# Patient Record
Sex: MALE | Race: BLACK OR AFRICAN AMERICAN | NOT HISPANIC OR LATINO | Employment: FULL TIME | URBAN - METROPOLITAN AREA
[De-identification: names, ages, dates, MRNs, and addresses within clinical notes are randomized per-mention and may not be internally consistent; named-entity substitution may affect disease eponyms.]

---

## 2017-04-05 ENCOUNTER — PATIENT MESSAGE (OUTPATIENT)
Dept: FAMILY MEDICINE | Facility: CLINIC | Age: 37
End: 2017-04-05

## 2017-04-05 DIAGNOSIS — E66.01 MORBID OBESITY WITH BMI OF 40.0-44.9, ADULT: Primary | ICD-10-CM

## 2017-04-05 NOTE — TELEPHONE ENCOUNTER
Patient requesting referral to nutrition.   Please advised him I have referred him to [facility]. They can talk about diet and exercise there.     Regarding connecting the [facility]bit, he can come in anytime and see someone in the [facility] for questions connecting.

## 2017-04-28 ENCOUNTER — OFFICE VISIT (OUTPATIENT)
Dept: FAMILY MEDICINE | Facility: CLINIC | Age: 37
End: 2017-04-28
Payer: COMMERCIAL

## 2017-04-28 VITALS
BODY MASS INDEX: 35.16 KG/M2 | WEIGHT: 224 LBS | HEIGHT: 67 IN | HEART RATE: 90 BPM | DIASTOLIC BLOOD PRESSURE: 80 MMHG | TEMPERATURE: 99 F | SYSTOLIC BLOOD PRESSURE: 130 MMHG | OXYGEN SATURATION: 97 %

## 2017-04-28 DIAGNOSIS — E66.01 SEVERE OBESITY (BMI 35.0-35.9 WITH COMORBIDITY): ICD-10-CM

## 2017-04-28 DIAGNOSIS — E78.5 HYPERLIPIDEMIA, UNSPECIFIED HYPERLIPIDEMIA TYPE: Chronic | ICD-10-CM

## 2017-04-28 DIAGNOSIS — I10 ESSENTIAL HYPERTENSION: Primary | Chronic | ICD-10-CM

## 2017-04-28 PROCEDURE — 3079F DIAST BP 80-89 MM HG: CPT | Mod: S$GLB,,, | Performed by: FAMILY MEDICINE

## 2017-04-28 PROCEDURE — 3075F SYST BP GE 130 - 139MM HG: CPT | Mod: S$GLB,,, | Performed by: FAMILY MEDICINE

## 2017-04-28 PROCEDURE — 1160F RVW MEDS BY RX/DR IN RCRD: CPT | Mod: S$GLB,,, | Performed by: FAMILY MEDICINE

## 2017-04-28 PROCEDURE — 99999 PR PBB SHADOW E&M-EST. PATIENT-LVL III: CPT | Mod: PBBFAC,,, | Performed by: FAMILY MEDICINE

## 2017-04-28 PROCEDURE — 99213 OFFICE O/P EST LOW 20 MIN: CPT | Mod: S$GLB,,, | Performed by: FAMILY MEDICINE

## 2017-04-28 NOTE — PROGRESS NOTES
Routine Office Visit    Patient Name: Romain French    : 1980  MRN: [id number]    Subjective:  Romain is a 37 y.o. male who presents today for     1. Hypertension - patient is here for hypertension follow-up. He is concerned because he has been monitoring his blood pressure at home and it has been in the 140s/90s. He has been working on losing weight. Working out daily with cardio and weights. He has been making dietary changes and eating more protein and less carbohydrates. He does have a stressful job and tends to measure his blood pressures in the evening versus in the mornings.     Review of Systems   Constitutional: Negative for chills and fever.   HENT: Negative for congestion.    Eyes: Negative for blurred vision.   Respiratory: Negative for cough.    Cardiovascular: Negative for chest pain.   Gastrointestinal: Negative for abdominal pain, constipation, diarrhea, heartburn, nausea and vomiting.   Genitourinary: Negative for dysuria.   Musculoskeletal: Negative for myalgias.   Skin: Negative for itching and rash.   Neurological: Negative for dizziness and headaches.   Psychiatric/Behavioral: Negative for depression.       Active Problem List  Patient Active Problem List   Diagnosis    Hyperlipidemia    Vitamin D deficiency    Severe obesity (BMI 35.0-35.9 with comorbidity)    Essential hypertension       Past Surgical History  History reviewed. No pertinent surgical history.    Family History  History reviewed. No pertinent family history.    Social History  Social History     Social History    Marital status:      Spouse name: N/A    Number of children: N/A    Years of education: N/A     Occupational History    Not on file.     Social History Main Topics    Smoking status: Never Smoker    Smokeless tobacco: Never Used    Alcohol use Not on file    Drug use: Not on file    Sexual activity: Not on file     Other Topics Concern    Not on file     Social History Narrative       Medications and  "Allergies  Reviewed and updated.   Current Outpatient Prescriptions   Medication Sig    lisinopril-hydrochlorothiazide (PRINZIDE,ZESTORETIC) 20-12.5 mg per tablet Take 2 tablets by mouth  once a day    pravastatin (PRAVACHOL) 40 MG tablet Take 1 tablet by mouth once a day     No current facility-administered medications for this visit.        Physical Exam  /80 (BP Location: Right arm, Patient Position: Sitting, BP Method: Manual)  Pulse 90  Temp 98.6 °F (37 °C) (Oral)   Ht 5' 7" (1.702 m)  Wt 101.6 kg (223 lb 15.8 oz)  SpO2 97%  BMI 35.08 kg/m2  Physical Exam   Constitutional: He is oriented to person, place, and time. He appears well-developed and well-nourished.   HENT:   Head: Normocephalic and atraumatic.   Eyes: Conjunctivae and EOM are normal. Pupils are equal, round, and reactive to light.   Neck: Normal range of motion. Neck supple. No JVD present. No thyromegaly present.   Cardiovascular: Normal rate, regular rhythm and normal heart sounds.    Pulmonary/Chest: Effort normal and breath sounds normal. He has no wheezes.   Abdominal: Soft. Bowel sounds are normal. He exhibits no distension. There is no tenderness. There is no guarding.   Musculoskeletal: Normal range of motion.   Lymphadenopathy:     He has no cervical adenopathy.   Neurological: He is alert and oriented to person, place, and time.   Skin: Skin is warm and dry.   Psychiatric: He has a normal mood and affect. His behavior is normal.         Assessment/Plan:  Romain French is a 37 y.o. male who presents today for :    Essential hypertension  Recommend to monitor bp during day time   email me with results  No change in medication at this time     Hyperlipidemia, unspecified hyperlipidemia type  The current medical regimen is effective;  continue present plan and medications.    Severe obesity (BMI 35.0-35.9 with comorbidity)  Continue with lifestyle modification  Increase protein intake   Decrease salt intake       Return in about 3 " months (around 7/28/2017), or if symptoms worsen or fail to improve.

## 2017-05-26 RX ORDER — LISINOPRIL AND HYDROCHLOROTHIAZIDE 12.5; 2 MG/1; MG/1
TABLET ORAL
Qty: 180 TABLET | Refills: 0 | Status: SHIPPED | OUTPATIENT
Start: 2017-05-26 | End: 2017-08-14 | Stop reason: SDUPTHER

## 2017-05-26 RX ORDER — PRAVASTATIN SODIUM 40 MG/1
TABLET ORAL
Qty: 90 TABLET | Refills: 0 | Status: SHIPPED | OUTPATIENT
Start: 2017-05-26 | End: 2017-08-14 | Stop reason: SDUPTHER

## 2017-08-15 RX ORDER — LISINOPRIL AND HYDROCHLOROTHIAZIDE 12.5; 2 MG/1; MG/1
TABLET ORAL
Qty: 180 TABLET | Refills: 1 | Status: SHIPPED | OUTPATIENT
Start: 2017-08-15 | End: 2018-04-27 | Stop reason: ALTCHOICE

## 2017-08-15 RX ORDER — PRAVASTATIN SODIUM 40 MG/1
TABLET ORAL
Qty: 90 TABLET | Refills: 1 | Status: SHIPPED | OUTPATIENT
Start: 2017-08-15 | End: 2019-02-20 | Stop reason: SDUPTHER

## 2018-04-24 ENCOUNTER — HOSPITAL ENCOUNTER (OUTPATIENT)
Dept: RADIOLOGY | Facility: HOSPITAL | Age: 38
Discharge: HOME | End: 2018-04-24
Attending: ORTHOPAEDIC SURGERY
Payer: COMMERCIAL

## 2018-04-24 ENCOUNTER — OFFICE VISIT (OUTPATIENT)
Dept: SPORTS MEDICINE | Facility: CLINIC | Age: 38
End: 2018-04-24
Payer: COMMERCIAL

## 2018-04-24 VITALS
DIASTOLIC BLOOD PRESSURE: 100 MMHG | HEART RATE: 81 BPM | HEIGHT: 67 IN | WEIGHT: 220 LBS | SYSTOLIC BLOOD PRESSURE: 149 MMHG | BODY MASS INDEX: 34.53 KG/M2

## 2018-04-24 DIAGNOSIS — M25.572 BILATERAL ANKLE PAIN: ICD-10-CM

## 2018-04-24 DIAGNOSIS — M25.571 BILATERAL ANKLE PAIN: ICD-10-CM

## 2018-04-24 DIAGNOSIS — M25.561 PAIN IN BOTH KNEES, UNSPECIFIED CHRONICITY: Primary | ICD-10-CM

## 2018-04-24 DIAGNOSIS — M25.562 PAIN IN BOTH KNEES, UNSPECIFIED CHRONICITY: Primary | ICD-10-CM

## 2018-04-24 DIAGNOSIS — M25.562 PAIN IN BOTH KNEES, UNSPECIFIED CHRONICITY: ICD-10-CM

## 2018-04-24 DIAGNOSIS — M25.561 PAIN IN BOTH KNEES, UNSPECIFIED CHRONICITY: ICD-10-CM

## 2018-04-24 PROCEDURE — 73564 X-RAY EXAM KNEE 4 OR MORE: CPT | Mod: 26,50,, | Performed by: RADIOLOGY

## 2018-04-24 PROCEDURE — 99999 PR PBB SHADOW E&M-EST. PATIENT-LVL III: CPT | Mod: PBBFAC,,, | Performed by: ORTHOPAEDIC SURGERY

## 2018-04-24 PROCEDURE — 73564 X-RAY EXAM KNEE 4 OR MORE: CPT | Mod: TC,50,FY,PO

## 2018-04-24 PROCEDURE — 3077F SYST BP >= 140 MM HG: CPT | Mod: CPTII,S$GLB,, | Performed by: ORTHOPAEDIC SURGERY

## 2018-04-24 PROCEDURE — 99203 OFFICE O/P NEW LOW 30 MIN: CPT | Mod: S$GLB,,, | Performed by: ORTHOPAEDIC SURGERY

## 2018-04-24 PROCEDURE — 3080F DIAST BP >= 90 MM HG: CPT | Mod: CPTII,S$GLB,, | Performed by: ORTHOPAEDIC SURGERY

## 2018-04-24 RX ORDER — CELECOXIB 200 MG/1
200 CAPSULE ORAL 2 TIMES DAILY
Qty: 60 CAPSULE | Refills: 2 | Status: SHIPPED | OUTPATIENT
Start: 2018-04-24 | End: 2018-04-27

## 2018-04-24 NOTE — PROGRESS NOTES
CC: Bilateral knee pain    38 y.o. Male with a history of bilateral knee pain without JOSE MARIA for 1 year.  He recently lost over 40 pounds from working out.  He has a new job as a .  Reports kneeling on the right knee.      He reports that the pain and weakness. It also bothers him at night.    + mechanical symptoms, - instability    Is affecting ADLs.  Pain is 4/10 at it's worst.    REVIEW OF SYSTEMS:   Constitution: Negative. Negative for chills, fever and night sweats.   HENT: Negative for congestion and headaches.    Eyes: Negative for blurred vision, left vision loss and right vision loss.   Cardiovascular: Negative for chest pain and syncope.   Respiratory: Negative for cough and shortness of breath.    Endocrine: Negative for polydipsia, polyphagia and polyuria.   Hematologic/Lymphatic: Negative for bleeding problem. Does not bruise/bleed easily.   Skin: Negative for dry skin, itching and rash.   Musculoskeletal: Negative for falls. Positive for bilateral knee pain and  muscle weakness.   Gastrointestinal: Negative for abdominal pain and bowel incontinence.   Genitourinary: Negative for bladder incontinence and nocturia.   Neurological: Negative for disturbances in coordination, loss of balance and seizures.   Psychiatric/Behavioral: Negative for depression. The patient does not have insomnia.    Allergic/Immunologic: Negative for hives and persistent infections.     PAST MEDICAL HISTORY:   Past Medical History:   Diagnosis Date    Anxiety     Hyperlipidemia     Hypertension        PAST SURGICAL HISTORY:   History reviewed. No pertinent surgical history.    FAMILY HISTORY:   History reviewed. No pertinent family history.    SOCIAL HISTORY:   Social History     Social History    Marital status:      Spouse name: N/A    Number of children: N/A    Years of education: N/A     Occupational History    Not on file.     Social History Main Topics    Smoking status: Never Smoker     "Smokeless tobacco: Never Used    Alcohol use Not on file    Drug use: Unknown    Sexual activity: Not on file     Other Topics Concern    Not on file     Social History Narrative    No narrative on file       MEDICATIONS:     Current Outpatient Prescriptions:     lisinopril-hydrochlorothiazide (PRINZIDE,ZESTORETIC) 20-12.5 mg per tablet, Take 2 tablets by mouth  once a day, Disp: 180 tablet, Rfl: 1    pravastatin (PRAVACHOL) 40 MG tablet, Take 1 tablet by mouth once a day, Disp: 90 tablet, Rfl: 1    ALLERGIES:   Review of patient's allergies indicates:  No Known Allergies    VITAL SIGNS:   BP (!) 149/100   Pulse 81   Ht 5' 7" (1.702 m)   Wt 99.8 kg (220 lb)   BMI 34.46 kg/m²      PHYSICAL EXAMINATION:  General:  The patient is alert and oriented x 3.  Mood is pleasant.  Observation of ears, eyes and nose reveal no gross abnormalities.  No labored breathing observed.    BILATERAL KNEE EXAMINATION     OBSERVATION / INSPECTION   Gait:   Nonantalgic   Alignment:  Neutral   Scars:   None   Muscle atrophy: Mild  Effusion:  None   Warmth:  None   Discoloration:   none     TENDERNESS / CREPITUS (T / C):          T / C      T / C   Patella   + / -   Lateral joint line   - / -   Peripatellar medial  -  Medial joint line    - / -   Peripatellar lateral -  Medial plica   - / -   Patellar tendon +   Popliteal fossa   - / -   Quad tendon   -   Gastrocnemius   -   Prepatellar Bursa - / -   Quadricep   -   Tibial tubercle  +  Thigh/hamstring  -   Pes anserine/HS -  Fibula    -   ITB   - / -  Tibia     -   Tib/fib joint  - / -  LCL    -     MFC   - / -   MCL: Proximal  -    LFC   - / -    Distal   -          ROM: (* = pain)  PASSIVE   ACTIVE    Left :   5 / 0 / 145   5 / 0 / 145     Right :    5 / 0 / 145   5 / 0 / 145    Patellofemoral examination:  See above noted areas of tenderness.   Patella position    Subluxation / dislocation: Centered           Sup. / Inf;   Normal   Crepitus (PF):    Absent   Patellar " Mobility:       Medial-lateral:   Normal    Superior-inferior:  Normal    Inferior tilt   Normal    Patellar tendon:  Normal   Lateral tilt:    Normal   J-sign:     None   Patellofemoral grind:   No pain       MENISCAL SIGNS:     Pain on terminal extension:  -  Pain on terminal flexion:  -  Yasmines maneuver:  -   Squat     + (for pain)    LIGAMENT EXAMINATION:  ACL / Lachman:  normal (-1 to 2mm)    PCL-Post.  drawer: normal 0 to 2mm  MCL- Valgus:  normal 0 to 2mm  LCL- Varus:  normal 0 to 2mm  Pivot shift: normal (Equal)   Dial Test: difference c/w other side   At 30° flexion: normal (< 5°)    At 90° flexion: normal (< 5°)   Reverse Pivot Shift:   normal (Equal)     STRENGTH: (* = with pain) PAINFUL SIDE   Quadricep   5/5   Hamstrin/5    EXTREMITY NEURO-VASCULAR EXAMINATION:   Sensation:  Grossly intact to light touch all dermatomal regions.   Motor Function:  Fully intact motor function at hip, knee, foot and ankle    DTRs;  quadriceps and  achilles 2+.  No clonus and downgoing Babinski.    Vascular status:  DP and PT pulses 2+, brisk capillary refill, symmetric.     XRAY (18): Bones are well mineralized.  Alignment is satisfactory.  No significant joint space narrowing.  Small osteophytes are noted.    ASSESSMENT:    Bilateral knee pain  Bilateral ankle pain    PLAN:   1. Physical therapy at [facility] location  2. Celebrex 200mg BID  3. Follow up 2 months      All questions were answered, pt will contact us for questions or concerns in the interim.

## 2018-04-27 ENCOUNTER — CLINICAL SUPPORT (OUTPATIENT)
Dept: REHABILITATION | Facility: HOSPITAL | Age: 38
End: 2018-04-27
Attending: ORTHOPAEDIC SURGERY
Payer: COMMERCIAL

## 2018-04-27 ENCOUNTER — OFFICE VISIT (OUTPATIENT)
Dept: FAMILY MEDICINE | Facility: CLINIC | Age: 38
End: 2018-04-27
Payer: COMMERCIAL

## 2018-04-27 VITALS
DIASTOLIC BLOOD PRESSURE: 100 MMHG | TEMPERATURE: 98 F | WEIGHT: 227.5 LBS | SYSTOLIC BLOOD PRESSURE: 150 MMHG | HEART RATE: 92 BPM | HEIGHT: 67 IN | OXYGEN SATURATION: 97 % | BODY MASS INDEX: 35.71 KG/M2

## 2018-04-27 DIAGNOSIS — L91.8 SKIN TAG: ICD-10-CM

## 2018-04-27 DIAGNOSIS — R53.1 DECREASED STRENGTH: ICD-10-CM

## 2018-04-27 DIAGNOSIS — L91.0 KELOID: ICD-10-CM

## 2018-04-27 DIAGNOSIS — I10 ESSENTIAL HYPERTENSION: Chronic | ICD-10-CM

## 2018-04-27 DIAGNOSIS — M25.572 CHRONIC PAIN OF LEFT ANKLE: ICD-10-CM

## 2018-04-27 DIAGNOSIS — Z00.00 ANNUAL PHYSICAL EXAM: Primary | ICD-10-CM

## 2018-04-27 DIAGNOSIS — M76.52 PATELLAR TENDINITIS OF BOTH KNEES: ICD-10-CM

## 2018-04-27 DIAGNOSIS — M76.51 PATELLAR TENDINITIS OF BOTH KNEES: ICD-10-CM

## 2018-04-27 DIAGNOSIS — M62.89 MUSCLE STIFFNESS: ICD-10-CM

## 2018-04-27 DIAGNOSIS — E78.5 HYPERLIPIDEMIA, UNSPECIFIED HYPERLIPIDEMIA TYPE: Chronic | ICD-10-CM

## 2018-04-27 DIAGNOSIS — G89.29 CHRONIC PAIN OF LEFT ANKLE: ICD-10-CM

## 2018-04-27 DIAGNOSIS — E66.01 SEVERE OBESITY (BMI 35.0-35.9 WITH COMORBIDITY): Chronic | ICD-10-CM

## 2018-04-27 PROCEDURE — 99999 PR PBB SHADOW E&M-EST. PATIENT-LVL IV: CPT | Mod: PBBFAC,,, | Performed by: FAMILY MEDICINE

## 2018-04-27 PROCEDURE — 97140 MANUAL THERAPY 1/> REGIONS: CPT | Mod: PN

## 2018-04-27 PROCEDURE — 97162 PT EVAL MOD COMPLEX 30 MIN: CPT | Mod: PN

## 2018-04-27 PROCEDURE — 97110 THERAPEUTIC EXERCISES: CPT | Mod: PN

## 2018-04-27 PROCEDURE — 3077F SYST BP >= 140 MM HG: CPT | Mod: CPTII,S$GLB,, | Performed by: FAMILY MEDICINE

## 2018-04-27 PROCEDURE — 99396 PREV VISIT EST AGE 40-64: CPT | Mod: S$GLB,,, | Performed by: FAMILY MEDICINE

## 2018-04-27 PROCEDURE — 3080F DIAST BP >= 90 MM HG: CPT | Mod: CPTII,S$GLB,, | Performed by: FAMILY MEDICINE

## 2018-04-27 RX ORDER — AMLODIPINE BESYLATE 10 MG/1
10 TABLET ORAL DAILY
Qty: 30 TABLET | Refills: 11 | Status: SHIPPED | OUTPATIENT
Start: 2018-04-27 | End: 2019-02-20 | Stop reason: SDUPTHER

## 2018-04-27 NOTE — PATIENT INSTRUCTIONS
Prevention Guidelines, Men Ages 40 to 49  Screening tests and vaccines are an important part of managing your health. Health counseling is essential, too. Below are guidelines for these, for men ages 40 to 49. Talk with your healthcare provider to make sure youre up to date on what you need.  Screening Who needs it How often   Alcohol misuse All men in this age group At routine exams   Blood pressure All men in this age group Every 2 years if your blood pressure reading is less than 120/80 mm Hg; yearly if your systolic blood pressure reading is 120 to 139 mm Hg, or your diastolic blood pressure reading is 80 to 89 mm Hg   Depression All men in this age group At routine exams   Type 2 diabetes or prediabetes All adults beginning at age 45 and adults without symptoms at any age who are overweight or obese and have 1 or more other risk factors for diabetes At least every 3 years (yearly if blood sugar has begun to rise)   Hepatitis C Men at increased risk for infection - talk with your healthcare provider At routine exams   High cholesterol or triglycerides All men ages 35 and older, and younger men at high risk for coronary artery disease At least every 5 years   HIV All men At routine exams   Obesity All men in this age group At routine exams   Prostate cancer Starting at age 45, talk to healthcare provider about risks and benefits of digital rectal exam (BISMARK) and prostate-specific antigen (PSA) screening1 At routine exams   Syphilis Men at increased risk for infection - talk with your healthcare provider At routine exams   Tuberculosis Men at increased risk for infection - talk with your healthcare provider Check with your healthcare provider   Vision All men in this age group Every 2 to 4 years if no risk factors for eye disease2   Vaccine Who needs it How often   Chickenpox (varicella) All men in this age group who have no record of this infection or vaccine 2 doses; the second dose should be given at least 4  weeks after the first dose   Hepatitis A Men at increased risk for infection - talk with your healthcare provider 2 doses given at least 6 months apart   Hepatitis B Men at increased risk for infection - talk with your healthcare provider 3 doses over 6 months; second dose should be given 1 month after the first dose; the third dose should be given at least 2 months after the second dose and at least 4 months after the first dose   Haemophilus influenzae Type B (HIB) Men at increased risk for infection - talk with your healthcare provider 1 to 3 doses   Influenza (flu) All men in this age group Once a year   Measles, mumps, rubella (MMR) All men in this age group who have no record of these infections or vaccines 1 or 2 doses   Meningococcal Men at increased risk for infection - talk with your healthcare provider 1 or more doses   Pneumococcal conjugate vaccine (PCV13) and pneumococcal polysaccharide vaccine (PPSV23) Men at increased risk for infection - talk with your healthcare provider PCV13: 1 dose ages 19 to 65 (protects against 13 types of pneumococcal bacteria)     PPSV23: 1 to 2 doses through age 64, or 1 dose at 65 or older (protects against 23 types of pneumococcal bacteria)      Tetanus/diphtheria/  pertussis (Td/Tdap) booster All men in this age group Td every 10 years, or a one-time dose of Tdap instead of a Td booster after age 18, then Td every 10 years   Counseling Who needs it How often   Diet and exercise Men who are overweight or obese When diagnosed, and then at routine exams   Sexually transmitted infection prevention Men at increased risk for infection - talk with your healthcare provider At routine exams   Use of daily aspirin Men ages 45 to 79 at risk for cardiovascular health problems At routine exams   Use of tobacco and the health effects it can cause All men in this age group Every exam   [address] [place] [address] [facility]   [facility] [employer]  Date Last Reviewed:  2/1/2017  © 9584-8967 [employer], [employer]. [address] [place] [address], [place] [address]. All rights reserved. This information is not intended as a substitute for professional medical care. Always follow your healthcare professional's instructions.

## 2018-04-27 NOTE — PLAN OF CARE
"  TIME RECORD    Date: 04/27/2018    Start Time:  3:00  Stop Time:  4:00  Total Timed Minutes:  60 min      OUTPATIENT PHYSICAL THERAPY   PATIENT EVALUATION  Onset Date: 1 year ago  Primary Diagnosis:   1. Patellar tendinitis of both knees     2. Chronic pain of left ankle     3. Decreased strength     4. Muscle stiffness       Treatment Diagnosis: bilateral patellar tendinopathy, artie knee pain, L ankle pain, decreased ROM/flexibility, joint mobility, strength, difficulty walking  Past Medical History:   Diagnosis Date    Anxiety     Hyperlipidemia     Hypertension      Precautions: obesity (BMI 35.6 kg/m2), HTN  Prior Therapy: none for c/c  Medications: Romain French has a current medication list which includes the following prescription(s): amlodipine and pravastatin.    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 .   Place of Residence (Steps/Adaptations): 2 story home with kids, wife.  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 He also reports recurrent weakness > than pain in the left ankle following a bad sprain 4 years ago. "It feels unstable and I am not able to perform a single leg heel raise on that side." Pt reports that he has lost 40 lbs from working out and is now a  for [employer]. He says that he wears a brace on both knees and his L ankle, but all joints involved will flare up by the end of a workout session or by the end of the day.    Pain:  Location: knees (R>L), L ankle  Description: Aching, Dull, Tight and Sharp  Activities Which Increase Pain: heel raises, " "squatting, lunging, sitting, stair climbing, kneeling  Activities Which Decrease Pain: rest, ibuprofen, stretches  Pain Scale: 1/10 at best 1/10 now  9/10 at worst    Objective     Posture: fit, endomorph. Visible gastroc atrophy on LLE, visible enlargement of R tibial tuberosity  Palpation: TTP Leonid patellar tendons  Sensation: intact  DTRs: 2+    Hip ROM: WNL  Hip strength: grossly 4-4+/5    Ankle ROM: WFL  Ankle strength: DF = 5, PF = 4    Knee  Right   Left  Pain/Dysfunction with Movement    AROM PROM MMT AROM PROM MMT    Flexion 125 130* 4+ 135 137 4+ *tension-type pain over R anterior knee   Extension 0 NT 4+ 0 NT 4+      Flexibility:   Zaki test: poor hip flexors > quads  Magaly test: WFL  BRITT: NT  Hamstring (SLR): >70 deg  Gastroc/ soleus: good    Joint Mobility: 2+/6    Special Tests:   Anterior Drawer: -  Posterior Drawer: -  Valentín's: -  Posterior Sag: -  Varus/Valgus stress test: -  Anterior Draw Sign (ankle): -    Selective Functional Movement Assessment:   Central Left Right   Active Cervical Flexion FN --- ---   Active Cervical Extension FN --- ---   Cervical Rotation Bend --- DN DN   Upper Extremity Pattern 1 --- DN DN   Upper Extremity Pattern 2 --- DN DN   Multisegmental Flexion FN --- ---   Multisegmental Exetnsion FN --- ---   Multisegmental Rotation --- FP FP   Single Leg Stance --- FP FP   Overhead Deep Squat DP --- ---     Abbreviations:  FN - functional non painful  FP - functional painful  DP - dysfunctional painful  DN - dysfunctional non painful    Gait: Without AD  Analysis: Assistance independent  Bed Mobility:Independent  Transfers: Independent    Other: FOTO limitation = 34% disability  Examination time: 15 min    Treatment:   SLR: 10x - performed Leonid  Prone hip ext: 10x - performed Leonid  Prone donkey kick: 10x - performed Leonid  Clams: 15x GTB - performed Leonid  Prone quad stretch (bolster under quad): 3 x 30" - performed Leonid  Resisted ankle PF: 10x GTB  Resisted ankle Inv: 10x " GTB  Resisted ankel Ev: 10 x GTB    Manual therapy:  10 min x preparation and application of kinesiotape to artie patellar tendons for inhibition. Patient received education regarding appropriate care and removal of Kinesiotape. Patient instructed in proper removal techniques if skin irritation occurs.    Patient education: Patient educated regarding surgical procedure, pathogenesis, diagnosis, protocol, prognosis, POC, and HEP. Written Home Exercises Provided with written and verbal instructions for frequency and duration of the following exercises: see clinical reference tab. Pt educated on HEP and activity modifications to reduce c/o pain and improve overall function. Pt was educated in posture and body mechanics.  Pt also educated on use of modalities prn to reduce c/o pain and dysfunction. Patient demo good understanding of the education provided. Patient demo good return demo of skill of exercises.        Assessment       Initial Assessment (Pertinent finding, problem list and factors affecting outcome): Patient presents with bilateral knee pain with s/s associated with patellar tendinopathy. Pt also presents with L ankle weakness that is residual from a sprain 4 years ago. Current impairments limits patient with all functional activities. Patient requires skilled PT to address remaining deficits and return patient to [facility]. Pt has set realistic goals and has verbalized good understanding and agreement with reported diagnosis, prognosis and treatment. Pt demonstrates no additional cultural, spiritual or educational need and currently has no barriers to learning.     Rehab Potiential: good     Medical necessity is demonstrated by the following problem list.  Pt presents with the following impairments:     History  Co-morbidities and personal factors that may impact the plan of care Examination  Body Structures and Functions, activity limitations and participation restrictions that may impact the plan of care  Clinical Presentation   Decision Making/ Complexity Score   Co-morbidities:     obesity (BMI 35.6 kg/m2), HTN            Personal Factors:   Age  Occupation  Activity level  Attitude   Multiple involved joints Body Regions: Leonid knees, L ankle    Body Systems: Musculoskeletal (ROM, strength, symmetry, joint mobility, soft tissue or myofascial mobility, flexibility), Neuromuscular (postural alignment, body mechanics, balance, gait, transfers, motor control, motor learning), cardiovascular (endurance), Integumentary (skin integrity)    Activity limitations:   Learning and applying knowledge  no deficits    General Tasks and Commands  no deficits    Communication  no deficits    Mobility  lifting and carrying objects  walking  driving (bike, car, motorcycle)    Self care  looking after one's health    Domestic Life  shopping  cooking  doing house work (cleaning house, washing dishes, laundry)  assisting others    Interactions/Relationships  no deficits    Life Areas  no deficits    Community and Social Life  community life  recreation and leisure  Mandaeism and spirituality      Participation Restrictions:   heel raises, squatting, lunging, sitting, stair climbing, kneeling         Evolving clinical presentation with changing clinical characteristics           Moderate          FOTO: 34% disability       Short Term Goals (4 weeks)  1. Pt will demonstrate improvements in right knee ROM to 0-0-130 for ease with ambulation  2. Pt will demonstrate improvements in right knee strength to 4/5 for ease with getting out of a chair.  3. Pt will be able to ambulate 200 ft with LRAD and without the presence of antalgic gait pattern  4. Pt will report <4/10 pain within the right knee for ease with ADL's  Long Term Goals (8 weeks)  1. Pt will demonstrate improvements in right knee ROM to 0-0-135 for ease with ambulation  2. Pt will demonstrate 5/5 strength within the right knee for ease with house hold chores and climbing stairs  3.  Pt will report being independent with his/her HEP for maintenance of improvements gained during therapy sessions  4. Pt will report <2/10 pain within the right knee for ease with ADLs  5. Pt will demonstrate ambulation x 300 ft without AD or antalgic gait.         Plan     Certification Period: 4/27/18 to 7/27/18  Recommended Treatment Plan: 1-2 times per week for 8-10 weeks, pending progression: Electrical Stimulation prn, Iontophoresis (with dexamethasone prn), Manual Therapy, Moist Heat/ Ice, Neuromuscular Re-ed, Patient Education, Self Care, Therapeutic Activites, Therapeutic Exercise and Other IASTYM, therapeutic taping, dry needling, aquatic therapy  Other Recommendations: Progress HEP towards D/C. Recommend F/U with MD if symptoms worsen or do not resolve. Patient may be seen by a PTA for treatment to carry out their plan of care.  Face-to-face conferences will be held.          Therapist: Jennifer Macdonald, PT    I CERTIFY THE NEED FOR THESE SERVICES FURNISHED UNDER THIS PLAN OF TREATMENT AND WHILE UNDER MY CARE    Physician's comments: ________________________________________________________________________________________________________________________________________________      Physician's Name: ___________________________________

## 2018-04-28 ENCOUNTER — PATIENT MESSAGE (OUTPATIENT)
Dept: SPORTS MEDICINE | Facility: CLINIC | Age: 38
End: 2018-04-28

## 2018-04-29 NOTE — PROGRESS NOTES
Office Visit for Annual Exam    Patient Name: Romain French    : 1980  MRN: [id number]    Subjective:  Romain is a 38 y.o. male who presents today for     1. Annual physical  2. Hypertension - pt feels that the lisinopril / hctz combination does not work for patient. He has been on this regimen and notes blood pressure remains elevated. He mas been making changes to his exercise and diet without improvement. He is now a  and works out frequently doing weights and cardio. No reported side effects from current medication regimen.   3. Keloid on back of scalp. Pt would like to be referred to dermatology for treatment. It is becoming bothersome to patient. Pt states his family members also have keloid scars.     Review of Systems   Constitutional: Negative for chills and fever.   HENT: Negative for congestion.    Eyes: Negative for blurred vision.   Respiratory: Negative for cough.    Cardiovascular: Negative for chest pain.   Gastrointestinal: Negative for abdominal pain, constipation, diarrhea, heartburn, nausea and vomiting.   Genitourinary: Negative for dysuria.   Musculoskeletal: Negative for myalgias.   Skin: Negative for itching and rash.   Neurological: Negative for dizziness and headaches.   Psychiatric/Behavioral: Negative for depression.       Active Problem List  Patient Active Problem List   Diagnosis    Hyperlipidemia    Vitamin D deficiency    Severe obesity (BMI 35.0-35.9 with comorbidity)    Essential hypertension    Patellar tendinitis of both knees    Chronic pain of left ankle    Decreased strength    Muscle stiffness       Past Surgical History  History reviewed. No pertinent surgical history.    Family History  History reviewed. No pertinent family history.    Social History  Social History     Social History    Marital status:      Spouse name: N/A    Number of children: N/A    Years of education: N/A     Occupational History    Not on file.     Social History Main  "Topics    Smoking status: Never Smoker    Smokeless tobacco: Never Used    Alcohol use Not on file    Drug use: Unknown    Sexual activity: Not on file     Other Topics Concern    Not on file     Social History Narrative    No narrative on file       Medications and Allergies  Reviewed and updated.   Current Outpatient Prescriptions   Medication Sig    pravastatin (PRAVACHOL) 40 MG tablet Take 1 tablet by mouth once a day    amLODIPine (NORVASC) 10 MG tablet Take 1 tablet (10 mg total) by mouth once daily.     No current facility-administered medications for this visit.        Physical Exam  BP (!) 150/100 (BP Location: Left arm, Patient Position: Sitting, BP Method: Large (Manual))   Pulse 92   Temp 97.9 °F (36.6 °C) (Oral)   Ht 5' 7" (1.702 m)   Wt 103.2 kg (227 lb 8.2 oz)   SpO2 97%   BMI 35.63 kg/m²   Physical Exam   Constitutional: He is oriented to person, place, and time. He appears well-developed and well-nourished.   HENT:   Head: Normocephalic and atraumatic.   Eyes: Conjunctivae and EOM are normal. Pupils are equal, round, and reactive to light.   Neck: Normal range of motion. Neck supple. No JVD present. No thyromegaly present.   Cardiovascular: Normal rate, regular rhythm and normal heart sounds.    Pulmonary/Chest: Effort normal and breath sounds normal. He has no wheezes.   Abdominal: Soft. Bowel sounds are normal. He exhibits no distension. There is no tenderness. There is no guarding.   Musculoskeletal: Normal range of motion.   Lymphadenopathy:     He has no cervical adenopathy.   Neurological: He is alert and oriented to person, place, and time.   Skin: Skin is warm and dry.   Psychiatric: He has a normal mood and affect. His behavior is normal.         Assessment/Plan:  Romain French is a 38 y.o. male who presents today for :    Annual physical exam  -     Lipid panel; Future; Expected date: 04/27/2018  -     TSH; Future; Expected date: 04/27/2018  -     CBC auto differential; " Future; Expected date: 04/27/2018  -     Comprehensive metabolic panel; Future; Expected date: 04/27/2018  -     Hemoglobin A1c; Future; Expected date: 04/27/2018  Health Maintenance       Date Due Completion Date    TETANUS VACCINE 04/04/1996 ---    Influenza Vaccine 08/01/2017 10/28/2016    Lipid Panel 10/25/2021 10/25/2016          Essential hypertension  -     amLODIPine (NORVASC) 10 MG tablet; Take 1 tablet (10 mg total) by mouth once daily.  Dispense: 30 tablet; Refill: 11  Common side effects of this medication were discussed with the patient. Questions regarding medications were discussed during this visit.   Stop lisinopril /hctz     Hyperlipidemia, unspecified hyperlipidemia type  F/u with blood work and use ASCVD calculator     Severe obesity (BMI 35.0-35.9 with comorbidity)  BMI noted  Pt is making changes to his diet / exercise regimen     Keloid  -     Ambulatory consult to Dermatology    Skin tag  -     Ambulatory consult to Dermatology        Follow-up in about 3 months (around 7/27/2018), or if symptoms worsen or fail to improve, for yearly exam.

## 2018-04-30 ENCOUNTER — PATIENT MESSAGE (OUTPATIENT)
Dept: FAMILY MEDICINE | Facility: CLINIC | Age: 38
End: 2018-04-30

## 2019-02-20 ENCOUNTER — OFFICE VISIT (OUTPATIENT)
Dept: FAMILY MEDICINE | Facility: CLINIC | Age: 39
End: 2019-02-20
Payer: COMMERCIAL

## 2019-02-20 VITALS
HEART RATE: 89 BPM | HEIGHT: 67 IN | BODY MASS INDEX: 35.88 KG/M2 | SYSTOLIC BLOOD PRESSURE: 166 MMHG | WEIGHT: 228.63 LBS | RESPIRATION RATE: 18 BRPM | DIASTOLIC BLOOD PRESSURE: 120 MMHG | OXYGEN SATURATION: 97 % | TEMPERATURE: 98 F

## 2019-02-20 DIAGNOSIS — E66.01 SEVERE OBESITY (BMI 35.0-35.9 WITH COMORBIDITY): Chronic | ICD-10-CM

## 2019-02-20 DIAGNOSIS — I10 ESSENTIAL HYPERTENSION: Primary | Chronic | ICD-10-CM

## 2019-02-20 DIAGNOSIS — E78.5 HYPERLIPIDEMIA, UNSPECIFIED HYPERLIPIDEMIA TYPE: Chronic | ICD-10-CM

## 2019-02-20 DIAGNOSIS — E55.9 VITAMIN D DEFICIENCY: ICD-10-CM

## 2019-02-20 PROCEDURE — 3008F PR BODY MASS INDEX (BMI) DOCUMENTED: ICD-10-PCS | Mod: CPTII,S$GLB,, | Performed by: FAMILY MEDICINE

## 2019-02-20 PROCEDURE — 99214 PR OFFICE/OUTPT VISIT, EST, LEVL IV, 30-39 MIN: ICD-10-PCS | Mod: S$GLB,,, | Performed by: FAMILY MEDICINE

## 2019-02-20 PROCEDURE — 3077F SYST BP >= 140 MM HG: CPT | Mod: CPTII,S$GLB,, | Performed by: FAMILY MEDICINE

## 2019-02-20 PROCEDURE — 3080F PR MOST RECENT DIASTOLIC BLOOD PRESSURE >= 90 MM HG: ICD-10-PCS | Mod: CPTII,S$GLB,, | Performed by: FAMILY MEDICINE

## 2019-02-20 PROCEDURE — 3008F BODY MASS INDEX DOCD: CPT | Mod: CPTII,S$GLB,, | Performed by: FAMILY MEDICINE

## 2019-02-20 PROCEDURE — 99999 PR PBB SHADOW E&M-EST. PATIENT-LVL III: ICD-10-PCS | Mod: PBBFAC,,, | Performed by: FAMILY MEDICINE

## 2019-02-20 PROCEDURE — 99999 PR PBB SHADOW E&M-EST. PATIENT-LVL III: CPT | Mod: PBBFAC,,, | Performed by: FAMILY MEDICINE

## 2019-02-20 PROCEDURE — 3077F PR MOST RECENT SYSTOLIC BLOOD PRESSURE >= 140 MM HG: ICD-10-PCS | Mod: CPTII,S$GLB,, | Performed by: FAMILY MEDICINE

## 2019-02-20 PROCEDURE — 99214 OFFICE O/P EST MOD 30 MIN: CPT | Mod: S$GLB,,, | Performed by: FAMILY MEDICINE

## 2019-02-20 PROCEDURE — 3080F DIAST BP >= 90 MM HG: CPT | Mod: CPTII,S$GLB,, | Performed by: FAMILY MEDICINE

## 2019-02-20 RX ORDER — AMLODIPINE BESYLATE 10 MG/1
10 TABLET ORAL DAILY
Qty: 90 TABLET | Refills: 1 | Status: SHIPPED | OUTPATIENT
Start: 2019-02-20 | End: 2019-10-09 | Stop reason: SDUPTHER

## 2019-02-20 RX ORDER — PRAVASTATIN SODIUM 40 MG/1
40 TABLET ORAL DAILY
Qty: 90 TABLET | Refills: 1 | Status: SHIPPED | OUTPATIENT
Start: 2019-02-20 | End: 2019-10-09 | Stop reason: SDUPTHER

## 2019-02-20 RX ORDER — LISINOPRIL 40 MG/1
40 TABLET ORAL DAILY
Qty: 90 TABLET | Refills: 1 | Status: SHIPPED | OUTPATIENT
Start: 2019-02-20 | End: 2019-10-09 | Stop reason: SDUPTHER

## 2019-02-20 RX ORDER — LISINOPRIL 40 MG/1
40 TABLET ORAL DAILY
COMMUNITY
End: 2019-02-20 | Stop reason: SDUPTHER

## 2019-02-20 NOTE — PROGRESS NOTES
Chief Complaint   Patient presents with    Vitamin D Deficiency    Hypertension       HPI  Romain French is a 38 y.o. male with multiple medical diagnoses as listed in the medical history and problem list that presents for follow-up for hypertension and vitamin D deficiency.    He has noted that his blood pressure has been elevated and at times he has headaches and blurred vision but is not sure if this is attributed to his blood pressure or other causes.    He has pain in his chest but attributes this to working out.    PAST MEDICAL HISTORY:  Past Medical History:   Diagnosis Date    Anxiety     Hyperlipidemia     Hypertension        PAST SURGICAL HISTORY:  No past surgical history on file.    SOCIAL HISTORY:  Social History     Socioeconomic History    Marital status:      Spouse name: Not on file    Number of children: Not on file    Years of education: Not on file    Highest education level: Not on file   Social Needs    Financial resource strain: Not on file    Food insecurity - worry: Not on file    Food insecurity - inability: Not on file    Transportation needs - medical: Not on file    Transportation needs - non-medical: Not on file   Occupational History    Not on file   Tobacco Use    Smoking status: Never Smoker    Smokeless tobacco: Never Used   Substance and Sexual Activity    Alcohol use: Not on file    Drug use: Not on file    Sexual activity: Not on file   Other Topics Concern    Not on file   Social History Narrative    Not on file       FAMILY HISTORY:  No family history on file.    ALLERGIES AND MEDICATIONS: updated and reviewed.  Review of patient's allergies indicates:  No Known Allergies  Current Outpatient Medications   Medication Sig Dispense Refill    amLODIPine (NORVASC) 10 MG tablet Take 1 tablet (10 mg total) by mouth once daily. 90 tablet 1    lisinopril (PRINIVIL,ZESTRIL) 40 MG tablet Take 1 tablet (40 mg total) by mouth once daily. 90 tablet 1     "pravastatin (PRAVACHOL) 40 MG tablet Take 1 tablet (40 mg total) by mouth once daily. 90 tablet 1     No current facility-administered medications for this visit.        ROS  Review of Systems   Constitutional: Negative for chills, fatigue, fever and unexpected weight change.   HENT: Negative for ear pain, postnasal drip, rhinorrhea, sinus pressure and sore throat.    Eyes: Negative for photophobia and visual disturbance.   Respiratory: Negative for apnea, cough, chest tightness, shortness of breath and wheezing.    Cardiovascular: Negative for chest pain and palpitations.   Gastrointestinal: Negative for abdominal pain, blood in stool, constipation, diarrhea, nausea and vomiting.   Genitourinary: Negative for difficulty urinating.   Musculoskeletal: Negative for arthralgias and joint swelling.   Skin: Negative for rash.   Neurological: Positive for headaches. Negative for facial asymmetry, speech difficulty, weakness and numbness.   Psychiatric/Behavioral: Negative for dysphoric mood.       Physical Exam  Vitals:    02/20/19 0719 02/20/19 0727   BP: (!) 168/120 (!) 166/120   Pulse: 89    Resp: 18    Temp: 98.2 °F (36.8 °C)    TempSrc: Oral    SpO2: 97%    Weight: 103.7 kg (228 lb 9.9 oz)    Height: 5' 7" (1.702 m)     Body mass index is 35.81 kg/m².  Weight: 103.7 kg (228 lb 9.9 oz)   Height: 5' 7" (170.2 cm)     Physical Exam   Constitutional: He is oriented to person, place, and time. He appears well-developed and well-nourished.   HENT:   Head: Normocephalic and atraumatic.   Mouth/Throat: No oropharyngeal exudate.   Eyes: EOM are normal.   Cardiovascular: Normal rate, regular rhythm and normal heart sounds. Exam reveals no gallop and no friction rub.   No murmur heard.  Pulmonary/Chest: Effort normal and breath sounds normal. No respiratory distress. He has no wheezes. He has no rales. He exhibits no tenderness.   Lymphadenopathy:     He has no cervical adenopathy.   Neurological: He is alert and oriented to " person, place, and time.   Skin: Skin is warm and dry.   Psychiatric: He has a normal mood and affect. His behavior is normal.   Nursing note and vitals reviewed.      Health Maintenance       Date Due Completion Date    TETANUS VACCINE 04/04/1996 ---    Influenza Vaccine 08/01/2018 10/28/2016    Lipid Panel 10/25/2021 10/25/2016            ASSESSMENT     1. Essential hypertension    2. Hyperlipidemia, unspecified hyperlipidemia type    3. Severe obesity (BMI 35.0-35.9 with comorbidity)    4. Vitamin D deficiency        PLAN:     Problem List Items Addressed This Visit        Cardiac/Vascular    Hyperlipidemia  (Chronic)  -on statin    Relevant Medications    pravastatin (PRAVACHOL) 40 MG tablet    Other Relevant Orders    Lipid panel    Essential hypertension (Chronic)-Primary  -will add amlodipine to lisinopril    Relevant Medications    amLODIPine (NORVASC) 10 MG tablet    lisinopril (PRINIVIL,ZESTRIL) 40 MG tablet    Other Relevant Orders    CBC auto differential    Comprehensive metabolic panel       Endocrine    Severe obesity (BMI 35.0-35.9 with comorbidity) (Chronic)  -he has lost weight and currently works out frequently    Relevant Orders    Lipid panel    Hemoglobin A1c    TSH    Vitamin D deficiency  -check vitamin D level    Relevant Orders    Vitamin D            Pricilla Ferrara MD  02/20/2019 7:41 AM        Follow-up in about 1 month (around 3/20/2019) for Follow up.

## 2019-03-20 ENCOUNTER — OFFICE VISIT (OUTPATIENT)
Dept: FAMILY MEDICINE | Facility: CLINIC | Age: 39
End: 2019-03-20
Payer: COMMERCIAL

## 2019-03-20 VITALS
TEMPERATURE: 98 F | BODY MASS INDEX: 35.82 KG/M2 | HEART RATE: 82 BPM | OXYGEN SATURATION: 97 % | RESPIRATION RATE: 18 BRPM | SYSTOLIC BLOOD PRESSURE: 136 MMHG | WEIGHT: 228.19 LBS | DIASTOLIC BLOOD PRESSURE: 84 MMHG | HEIGHT: 67 IN

## 2019-03-20 DIAGNOSIS — I10 ESSENTIAL HYPERTENSION: Primary | Chronic | ICD-10-CM

## 2019-03-20 DIAGNOSIS — E78.5 HYPERLIPIDEMIA, UNSPECIFIED HYPERLIPIDEMIA TYPE: Chronic | ICD-10-CM

## 2019-03-20 PROCEDURE — 99999 PR PBB SHADOW E&M-EST. PATIENT-LVL III: ICD-10-PCS | Mod: PBBFAC,,, | Performed by: FAMILY MEDICINE

## 2019-03-20 PROCEDURE — 99999 PR PBB SHADOW E&M-EST. PATIENT-LVL III: CPT | Mod: PBBFAC,,, | Performed by: FAMILY MEDICINE

## 2019-03-20 PROCEDURE — 3079F PR MOST RECENT DIASTOLIC BLOOD PRESSURE 80-89 MM HG: ICD-10-PCS | Mod: CPTII,S$GLB,, | Performed by: FAMILY MEDICINE

## 2019-03-20 PROCEDURE — 3075F SYST BP GE 130 - 139MM HG: CPT | Mod: CPTII,S$GLB,, | Performed by: FAMILY MEDICINE

## 2019-03-20 PROCEDURE — 3075F PR MOST RECENT SYSTOLIC BLOOD PRESS GE 130-139MM HG: ICD-10-PCS | Mod: CPTII,S$GLB,, | Performed by: FAMILY MEDICINE

## 2019-03-20 PROCEDURE — 99214 OFFICE O/P EST MOD 30 MIN: CPT | Mod: S$GLB,,, | Performed by: FAMILY MEDICINE

## 2019-03-20 PROCEDURE — 3079F DIAST BP 80-89 MM HG: CPT | Mod: CPTII,S$GLB,, | Performed by: FAMILY MEDICINE

## 2019-03-20 PROCEDURE — 99214 PR OFFICE/OUTPT VISIT, EST, LEVL IV, 30-39 MIN: ICD-10-PCS | Mod: S$GLB,,, | Performed by: FAMILY MEDICINE

## 2019-03-20 PROCEDURE — 3008F BODY MASS INDEX DOCD: CPT | Mod: CPTII,S$GLB,, | Performed by: FAMILY MEDICINE

## 2019-03-20 PROCEDURE — 3008F PR BODY MASS INDEX (BMI) DOCUMENTED: ICD-10-PCS | Mod: CPTII,S$GLB,, | Performed by: FAMILY MEDICINE

## 2019-03-20 NOTE — PROGRESS NOTES
Chief Complaint   Patient presents with    Hypertension    Follow-up       HPI  Romain French is a 38 y.o. male with multiple medical diagnoses as listed in the medical history and problem list that presents for follow-up for hypertension.    He has been compliant with his medication and has noted an improvement in his blood pressure. He feels well and has no complaints. His headaches have resolved now that his pressure is controlled. He has reduced his weights and his chest pains have improved.     PAST MEDICAL HISTORY:  Past Medical History:   Diagnosis Date    Anxiety     Hyperlipidemia     Hypertension        PAST SURGICAL HISTORY:  History reviewed. No pertinent surgical history.    SOCIAL HISTORY:  Social History     Socioeconomic History    Marital status:      Spouse name: Not on file    Number of children: Not on file    Years of education: Not on file    Highest education level: Not on file   Social Needs    Financial resource strain: Not on file    Food insecurity - worry: Not on file    Food insecurity - inability: Not on file    Transportation needs - medical: Not on file    Transportation needs - non-medical: Not on file   Occupational History    Not on file   Tobacco Use    Smoking status: Never Smoker    Smokeless tobacco: Never Used   Substance and Sexual Activity    Alcohol use: Not on file    Drug use: Not on file    Sexual activity: Not on file   Other Topics Concern    Not on file   Social History Narrative    Not on file       FAMILY HISTORY:  History reviewed. No pertinent family history.    ALLERGIES AND MEDICATIONS: updated and reviewed.  Review of patient's allergies indicates:  No Known Allergies  Current Outpatient Medications   Medication Sig Dispense Refill    amLODIPine (NORVASC) 10 MG tablet Take 1 tablet (10 mg total) by mouth once daily. 90 tablet 1    lisinopril (PRINIVIL,ZESTRIL) 40 MG tablet Take 1 tablet (40 mg total) by mouth once daily. 90 tablet 1  "   pravastatin (PRAVACHOL) 40 MG tablet Take 1 tablet (40 mg total) by mouth once daily. 90 tablet 1     No current facility-administered medications for this visit.        ROS  Review of Systems   Constitutional: Negative for chills, fatigue, fever and unexpected weight change.   HENT: Negative for ear pain, postnasal drip, rhinorrhea, sinus pressure and sore throat.    Eyes: Negative for photophobia and visual disturbance.   Respiratory: Negative for apnea, cough, chest tightness, shortness of breath and wheezing.    Cardiovascular: Negative for chest pain and palpitations.   Gastrointestinal: Negative for abdominal pain, blood in stool, constipation, diarrhea, nausea and vomiting.   Genitourinary: Negative for difficulty urinating.   Musculoskeletal: Negative for arthralgias and joint swelling.   Skin: Negative for rash.   Neurological: Negative for facial asymmetry, speech difficulty, weakness, numbness and headaches.   Psychiatric/Behavioral: Negative for dysphoric mood.       Physical Exam  Vitals:    03/20/19 0723   BP: 136/84   Pulse: 82   Resp: 18   Temp: 98 °F (36.7 °C)   TempSrc: Oral   SpO2: 97%   Weight: 103.5 kg (228 lb 2.8 oz)   Height: 5' 7" (1.702 m)    Body mass index is 35.74 kg/m².  Weight: 103.5 kg (228 lb 2.8 oz)   Height: 5' 7" (170.2 cm)     Physical Exam   Constitutional: He is oriented to person, place, and time. He appears well-developed and well-nourished.   HENT:   Head: Normocephalic and atraumatic.   Eyes: EOM are normal.   Neurological: He is alert and oriented to person, place, and time.   Skin: Skin is warm and dry. No rash noted.   Psychiatric: He has a normal mood and affect. His behavior is normal.   Nursing note and vitals reviewed.      Health Maintenance       Date Due Completion Date    TETANUS VACCINE 04/04/1996 ---    Influenza Vaccine 08/01/2018 10/28/2016    Lipid Panel 10/25/2021 10/25/2016            ASSESSMENT     1. Essential hypertension    2. Hyperlipidemia, " unspecified hyperlipidemia type        PLAN:     Problem List Items Addressed This Visit        Cardiac/Vascular    Hyperlipidemia (Chronic)    Current Assessment & Plan     Continue statin         Essential hypertension - Primary (Chronic)    Current Assessment & Plan     Controlled on lisinopril and amlodipine                 Pricilla Ferrara MD  03/20/2019 7:35 AM        Follow-up in about 6 months (around 9/20/2019) for Follow up.

## 2019-03-23 LAB
25(OH)D3+25(OH)D2 SERPL-MCNC: 13.8 NG/ML (ref 30–100)
ALBUMIN SERPL-MCNC: 4.4 G/DL (ref 3.5–5.5)
ALBUMIN/GLOB SERPL: 1.7 {RATIO} (ref 1.2–2.2)
ALP SERPL-CCNC: 80 IU/L (ref 39–117)
ALT SERPL-CCNC: 32 IU/L (ref 0–44)
AST SERPL-CCNC: 18 IU/L (ref 0–40)
BASOPHILS # BLD AUTO: 0 X10E3/UL (ref 0–0.2)
BASOPHILS NFR BLD AUTO: 1 %
BILIRUB SERPL-MCNC: 0.5 MG/DL (ref 0–1.2)
BUN SERPL-MCNC: 11 MG/DL (ref 6–24)
BUN/CREAT SERPL: 9 (ref 9–20)
CALCIUM SERPL-MCNC: 9.1 MG/DL (ref 8.7–10.2)
CHLORIDE SERPL-SCNC: 103 MMOL/L (ref 96–106)
CHOLEST SERPL-MCNC: 161 MG/DL (ref 100–199)
CO2 SERPL-SCNC: 26 MMOL/L (ref 20–29)
CREAT SERPL-MCNC: 1.16 MG/DL (ref 0.76–1.27)
EOSINOPHIL # BLD AUTO: 0.2 X10E3/UL (ref 0–0.4)
EOSINOPHIL NFR BLD AUTO: 3 %
ERYTHROCYTE [DISTWIDTH] IN BLOOD BY AUTOMATED COUNT: 13.8 % (ref 12.3–15.4)
GLOBULIN SER CALC-MCNC: 2.6 G/DL (ref 1.5–4.5)
GLUCOSE SERPL-MCNC: 68 MG/DL (ref 65–99)
HBA1C MFR BLD: 4.6 % (ref 4.8–5.6)
HCT VFR BLD AUTO: 45.5 % (ref 37.5–51)
HDLC SERPL-MCNC: 40 MG/DL
HGB BLD-MCNC: 15.1 G/DL (ref 13–17.7)
IMM GRANULOCYTES # BLD AUTO: 0 X10E3/UL (ref 0–0.1)
IMM GRANULOCYTES NFR BLD AUTO: 0 %
LDLC SERPL CALC-MCNC: 92 MG/DL (ref 0–99)
LYMPHOCYTES # BLD AUTO: 2.1 X10E3/UL (ref 0.7–3.1)
LYMPHOCYTES NFR BLD AUTO: 28 %
MCH RBC QN AUTO: 30.1 PG (ref 26.6–33)
MCHC RBC AUTO-ENTMCNC: 33.2 G/DL (ref 31.5–35.7)
MCV RBC AUTO: 91 FL (ref 79–97)
MONOCYTES # BLD AUTO: 0.7 X10E3/UL (ref 0.1–0.9)
MONOCYTES NFR BLD AUTO: 10 %
NEUTROPHILS # BLD AUTO: 4.4 X10E3/UL (ref 1.4–7)
NEUTROPHILS NFR BLD AUTO: 58 %
PLATELET # BLD AUTO: 237 X10E3/UL (ref 150–379)
POTASSIUM SERPL-SCNC: 4.2 MMOL/L (ref 3.5–5.2)
PROT SERPL-MCNC: 7 G/DL (ref 6–8.5)
RBC # BLD AUTO: 5.01 X10E6/UL (ref 4.14–5.8)
SODIUM SERPL-SCNC: 141 MMOL/L (ref 134–144)
TRIGL SERPL-MCNC: 145 MG/DL (ref 0–149)
TSH SERPL DL<=0.005 MIU/L-ACNC: 1.73 UIU/ML (ref 0.45–4.5)
VLDLC SERPL CALC-MCNC: 29 MG/DL (ref 5–40)
WBC # BLD AUTO: 7.5 X10E3/UL (ref 3.4–10.8)

## 2019-03-24 ENCOUNTER — PATIENT MESSAGE (OUTPATIENT)
Dept: FAMILY MEDICINE | Facility: CLINIC | Age: 39
End: 2019-03-24

## 2019-03-24 DIAGNOSIS — E55.9 VITAMIN D DEFICIENCY: Primary | ICD-10-CM

## 2019-03-24 RX ORDER — ERGOCALCIFEROL 1.25 MG/1
50000 CAPSULE ORAL
Qty: 12 CAPSULE | Refills: 0 | Status: SHIPPED | OUTPATIENT
Start: 2019-03-24 | End: 2019-04-23

## 2019-10-09 DIAGNOSIS — I10 ESSENTIAL HYPERTENSION: Chronic | ICD-10-CM

## 2019-10-09 DIAGNOSIS — E78.5 HYPERLIPIDEMIA, UNSPECIFIED HYPERLIPIDEMIA TYPE: Chronic | ICD-10-CM

## 2019-10-09 RX ORDER — PRAVASTATIN SODIUM 40 MG/1
TABLET ORAL
Qty: 120 TABLET | Refills: 0 | Status: SHIPPED | OUTPATIENT
Start: 2019-10-09 | End: 2020-01-07

## 2019-10-09 RX ORDER — LISINOPRIL 40 MG/1
TABLET ORAL
Qty: 120 TABLET | Refills: 0 | Status: SHIPPED | OUTPATIENT
Start: 2019-10-09 | End: 2020-01-07

## 2019-10-09 RX ORDER — AMLODIPINE BESYLATE 10 MG/1
TABLET ORAL
Qty: 120 TABLET | Refills: 0 | Status: SHIPPED | OUTPATIENT
Start: 2019-10-09 | End: 2019-10-21 | Stop reason: ALTCHOICE

## 2019-10-21 ENCOUNTER — OFFICE VISIT (OUTPATIENT)
Dept: FAMILY MEDICINE | Facility: CLINIC | Age: 39
End: 2019-10-21
Payer: COMMERCIAL

## 2019-10-21 VITALS
SYSTOLIC BLOOD PRESSURE: 160 MMHG | HEIGHT: 67 IN | OXYGEN SATURATION: 98 % | BODY MASS INDEX: 35.82 KG/M2 | WEIGHT: 228.19 LBS | TEMPERATURE: 98 F | DIASTOLIC BLOOD PRESSURE: 100 MMHG | HEART RATE: 96 BPM

## 2019-10-21 DIAGNOSIS — E78.5 HYPERLIPIDEMIA, UNSPECIFIED HYPERLIPIDEMIA TYPE: Chronic | ICD-10-CM

## 2019-10-21 DIAGNOSIS — E66.01 SEVERE OBESITY (BMI 35.0-35.9 WITH COMORBIDITY): Chronic | ICD-10-CM

## 2019-10-21 DIAGNOSIS — Z00.01 ENCOUNTER FOR ANNUAL GENERAL MEDICAL EXAMINATION WITH ABNORMAL FINDINGS IN ADULT: Primary | ICD-10-CM

## 2019-10-21 DIAGNOSIS — I10 ESSENTIAL HYPERTENSION: Chronic | ICD-10-CM

## 2019-10-21 PROCEDURE — 3077F PR MOST RECENT SYSTOLIC BLOOD PRESSURE >= 140 MM HG: ICD-10-PCS | Mod: CPTII,S$GLB,, | Performed by: NURSE PRACTITIONER

## 2019-10-21 PROCEDURE — 99214 PR OFFICE/OUTPT VISIT, EST, LEVL IV, 30-39 MIN: ICD-10-PCS | Mod: S$GLB,,, | Performed by: NURSE PRACTITIONER

## 2019-10-21 PROCEDURE — 99214 OFFICE O/P EST MOD 30 MIN: CPT | Mod: S$GLB,,, | Performed by: NURSE PRACTITIONER

## 2019-10-21 PROCEDURE — 99999 PR PBB SHADOW E&M-EST. PATIENT-LVL IV: CPT | Mod: PBBFAC,,, | Performed by: NURSE PRACTITIONER

## 2019-10-21 PROCEDURE — 99999 PR PBB SHADOW E&M-EST. PATIENT-LVL IV: ICD-10-PCS | Mod: PBBFAC,,, | Performed by: NURSE PRACTITIONER

## 2019-10-21 PROCEDURE — 3008F PR BODY MASS INDEX (BMI) DOCUMENTED: ICD-10-PCS | Mod: CPTII,S$GLB,, | Performed by: NURSE PRACTITIONER

## 2019-10-21 PROCEDURE — 3080F PR MOST RECENT DIASTOLIC BLOOD PRESSURE >= 90 MM HG: ICD-10-PCS | Mod: CPTII,S$GLB,, | Performed by: NURSE PRACTITIONER

## 2019-10-21 PROCEDURE — 3080F DIAST BP >= 90 MM HG: CPT | Mod: CPTII,S$GLB,, | Performed by: NURSE PRACTITIONER

## 2019-10-21 PROCEDURE — 3008F BODY MASS INDEX DOCD: CPT | Mod: CPTII,S$GLB,, | Performed by: NURSE PRACTITIONER

## 2019-10-21 PROCEDURE — 3077F SYST BP >= 140 MM HG: CPT | Mod: CPTII,S$GLB,, | Performed by: NURSE PRACTITIONER

## 2019-10-21 RX ORDER — NIFEDIPINE 90 MG/1
90 TABLET, EXTENDED RELEASE ORAL DAILY
Qty: 90 TABLET | Refills: 3 | Status: SHIPPED | OUTPATIENT
Start: 2019-10-21 | End: 2020-10-24 | Stop reason: SDUPTHER

## 2019-10-21 NOTE — PATIENT INSTRUCTIONS
Established High Blood Pressure    High blood pressure (hypertension) is a chronic disease. Often, healthcare providers dont know what causes it. But it can be caused by certain health conditions and medicines.  If you have high blood pressure, you may not have any symptoms. If you do have symptoms, they may include headache, dizziness, changes in your vision, chest pain, and shortness of breath. But even without symptoms, high blood pressure thats not treated raises your risk for heart attack and stroke. High blood pressure is a serious health risk and shouldnt be ignored.  A blood pressure reading is made up of two numbers: a higher number over a lower number. The top number is the systolic pressure. The bottom number is the diastolic pressure. A normal blood pressure is a systolic pressure of  less than 120 over a diastolic pressure of less than 80. You will see your blood pressure readings written together. For example, a person with a systolic pressure of 188 and a diastolic pressure of 78 will have 118/78 written in the medical record.  High blood pressure is when either the top number is 140 or higher, or the bottom number is 90 or higher. This must be the result when taking your blood pressure a number of times. The blood pressures between normal and high are called prehypertension.  Home care  If you have high blood pressure, you should do what is listed below to lower your blood pressure. If you are taking medicines for high blood pressure, these methods may reduce or end your need for medicines in the future.  · Begin a weight-loss program if you are overweight.  · Cut back on how much salt you get in your diet. Heres how to do this:  ¨ Dont eat foods that have a lot of salt. These include olives, pickles, smoked meats, and salted potato chips.  ¨ Dont add salt to your food at the table.  ¨ Use only small amounts of salt when cooking.  · Start an exercise program. Talk with your healthcare  provider about the type of exercise program that would be best for you. It doesn't have to be hard. Even brisk walking for 20 minutes 3 times a week is a good form of exercise.  · Dont take medicines that stimulate the heart. This includes many over-the-counter cold and sinus decongestant pills and sprays, as well as diet pills. Check the warnings about hypertension on the label. Before buying any over-the-counter medicines or supplements, always ask the pharmacist about the product's potential interaction with your high blood pressure and your high blood pressure medicines.  · Stimulants such as amphetamine or cocaine could be deadly for someone with high blood pressure. Never take these.  · Limit how much caffeine you get in your diet. Switch to caffeine-free products.  · Stop smoking. If you are a long-time smoker, this can be hard. Talk to your healthcare provider about medicines and nicotine replacement options to help you. Also, enroll in a stop-smoking program to make it more likely that you will quit for good.  · Learn how to handle stress. This is an important part of any program to lower blood pressure. Learn about relaxation methods like meditation, yoga, or biofeedback.  · If your provider prescribed medicines, take them exactly as directed. Missing doses may cause your blood pressure get out of control.  · If you miss a dose or doses, check with your healthcare provider or pharmacist about what to do.  · Consider buying an automatic blood pressure machine. Ask your provider for a recommendation. You can get one of these at most pharmacies.     The American Heart Association recommends the following guidelines for home blood pressure monitoring:  · Don't smoke or drink coffee for 30 minutes before taking your blood pressure.  · Go to the bathroom before the test.  · Relax for 5 minutes before taking the measurement.  · Sit with your back supported (don't sit on a couch or soft chair); keep your feet on  the floor uncrossed. Place your arm on a solid flat surface (like a table) with the upper part of the arm at heart level. Place the middle of the cuff directly above the eye of the elbow. Check the monitor's instruction manual for an illustration.  · Take multiple readings. When you measure, take 2 to 3 readings one minute apart and record all of the results.  · Take your blood pressure at the same time every day, or as your healthcare provider recommends.  · Record the date, time, and blood pressure reading.  · Take the record with you to your next medical appointment. If your blood pressure monitor has a built-in memory, simply take the monitor with you to your next appointment.  · Call your provider if you have several high readings. Don't be frightened by a single high blood pressure reading, but if you get several high readings, check in with your healthcare provider.  · Note: When blood pressure reaches a systolic (top number) of 180 or higher OR diastolic (bottom number) of 110 or higher, seek emergency medical treatment.  Follow-up care  You will need to see your healthcare provider regularly. This is to check your blood pressure and to make changes to your medicines. Make a follow-up appointment as directed. Bring the record of your home blood pressure readings to the appointment.  When to seek medical advice  Call your healthcare provider right away if any of these occur:  · Blood pressure reaches a systolic (upper number) of 180 or higher OR a diastolic (bottom number) of 110 or higher  · Chest pain or shortness of breath  · Severe headache  · Throbbing or rushing sound in the ears  · Nosebleed  · Sudden severe pain in your belly (abdomen)  · Extreme drowsiness, confusion, or fainting  · Dizziness or spinning sensation (vertigo)  · Weakness of an arm or leg or one side of the face  · You have problems speaking or seeing   Date Last Reviewed: 12/1/2016  © 0920-2292 [employer]. [address] [place] [address]  [place] [address]. All rights reserved. This information is not intended as a substitute for professional medical care. Always follow your healthcare professional's instructions.

## 2019-10-23 NOTE — PROGRESS NOTES
Subjective:       Patient ID: Romain French is a 39 y.o. male.    Chief Complaint: Annual Exam    Romain French is a 39 y.o. male with multiple medical diagnoses as listed in the medical history and problem list that presents for annual and follow-up on  hypertension     He has noted that his blood pressure has been elevated and at times he has headaches but is not sure if this is attributed to his blood pressure or other causes.    Past Medical History:   Diagnosis Date    Anxiety     Hyperlipidemia     Hypertension      History reviewed. No pertinent surgical history.  Social History     Socioeconomic History    Marital status:      Spouse name: Not on file    Number of children: Not on file    Years of education: Not on file    Highest education level: Not on file   Occupational History    Not on file   Social Needs    Financial resource strain: Not on file    Food insecurity:     Worry: Not on file     Inability: Not on file    Transportation needs:     Medical: Not on file     Non-medical: Not on file   Tobacco Use    Smoking status: Never Smoker    Smokeless tobacco: Never Used   Substance and Sexual Activity    Alcohol use: Not on file    Drug use: Not on file    Sexual activity: Not on file   Lifestyle    Physical activity:     Days per week: Not on file     Minutes per session: Not on file    Stress: Not on file   Relationships    Social connections:     Talks on phone: Not on file     Gets together: Not on file     Attends Protestant service: Not on file     Active member of club or organization: Not on file     Attends meetings of clubs or organizations: Not on file     Relationship status: Not on file   Other Topics Concern    Not on file   Social History Narrative    Not on file     History reviewed. No pertinent family history.    Review of Systems   Constitutional: Negative for chills, diaphoresis, fatigue and fever.   HENT: Negative for congestion, postnasal drip, rhinorrhea,  sinus pressure and sneezing.    Respiratory: Negative for cough, chest tightness and shortness of breath.    Cardiovascular: Negative for chest pain, palpitations and leg swelling.   Gastrointestinal: Negative for abdominal pain, diarrhea, nausea and vomiting.   Genitourinary: Negative for decreased urine volume and difficulty urinating.   Musculoskeletal: Negative for arthralgias, back pain and myalgias.   Skin: Negative for color change and rash.   Neurological: Negative for dizziness, weakness, light-headedness and headaches.       Objective:      Physical Exam   Constitutional: He is oriented to person, place, and time. Vital signs are normal. He appears well-developed and well-nourished.   HENT:   Head: Normocephalic and atraumatic.   Right Ear: External ear normal.   Left Ear: External ear normal.   Nose: Nose normal.   Mouth/Throat: Oropharynx is clear and moist. No oropharyngeal exudate.   Cardiovascular: Normal rate, regular rhythm and normal heart sounds.   Pulmonary/Chest: Effort normal and breath sounds normal.   Abdominal: Soft. Bowel sounds are normal.   Neurological: He is alert and oriented to person, place, and time.   Skin: Skin is warm, dry and intact.   Psychiatric: He has a normal mood and affect.       Assessment:       1. Encounter for annual general medical examination with abnormal findings in adult    2. Severe obesity (BMI 35.0-35.9 with comorbidity)    3. Essential hypertension    4. Hyperlipidemia, unspecified hyperlipidemia type        Plan:       Romain was seen today for annual exam.    Diagnoses and all orders for this visit:    Encounter for annual general medical examination with abnormal findings in adult  -     CBC auto differential; Future  -     Comprehensive metabolic panel; Future    Severe obesity (BMI 35.0-35.9 with comorbidity)  Discussed diet, continued participation in tolerable fitness activities, health risks associated with obesity.     Essential hypertension  -      NIFEdipine (PROCARDIA-XL) 90 MG (OSM) 24 hr tablet; Take 1 tablet (90 mg total) by mouth once daily.  Discussed sodium restriction, maintaining ideal body weight and regular exercise program as physiologic means to achieve blood pressure control. The patient will strive towards this. The current medical regimen is effective; continue present plan and medications. Recommended patient to check home readings to monitor and see me for followup as scheduled or sooner as needed. Patient was educated that both decongestant and anti-inflammatory medication may raise blood pressure  Will increase procardia to 90 mg     Hyperlipidemia, unspecified hyperlipidemia type  -     Lipid panel; Future    We discussed low fat diet and regular exercise.The current medical regimen is effective; continue present plan and medications.

## 2020-01-07 DIAGNOSIS — I10 ESSENTIAL HYPERTENSION: Chronic | ICD-10-CM

## 2020-01-07 DIAGNOSIS — E78.5 HYPERLIPIDEMIA, UNSPECIFIED HYPERLIPIDEMIA TYPE: Chronic | ICD-10-CM

## 2020-01-07 RX ORDER — PRAVASTATIN SODIUM 40 MG/1
TABLET ORAL
Qty: 90 TABLET | Refills: 0 | Status: SHIPPED | OUTPATIENT
Start: 2020-01-07 | End: 2020-03-16 | Stop reason: SDUPTHER

## 2020-01-07 RX ORDER — LISINOPRIL 40 MG/1
TABLET ORAL
Qty: 90 TABLET | Refills: 0 | Status: SHIPPED | OUTPATIENT
Start: 2020-01-07 | End: 2020-03-16 | Stop reason: SDUPTHER

## 2020-01-17 ENCOUNTER — OFFICE VISIT (OUTPATIENT)
Dept: FAMILY MEDICINE | Facility: CLINIC | Age: 40
End: 2020-01-17
Payer: COMMERCIAL

## 2020-01-17 VITALS
TEMPERATURE: 99 F | BODY MASS INDEX: 37.23 KG/M2 | HEIGHT: 67 IN | SYSTOLIC BLOOD PRESSURE: 132 MMHG | DIASTOLIC BLOOD PRESSURE: 84 MMHG | HEART RATE: 78 BPM | OXYGEN SATURATION: 99 % | WEIGHT: 237.19 LBS

## 2020-01-17 DIAGNOSIS — F32.A DEPRESSION, UNSPECIFIED DEPRESSION TYPE: Primary | ICD-10-CM

## 2020-01-17 PROCEDURE — 99999 PR PBB SHADOW E&M-EST. PATIENT-LVL IV: ICD-10-PCS | Mod: PBBFAC,,, | Performed by: PHYSICIAN ASSISTANT

## 2020-01-17 PROCEDURE — 3079F PR MOST RECENT DIASTOLIC BLOOD PRESSURE 80-89 MM HG: ICD-10-PCS | Mod: CPTII,S$GLB,, | Performed by: PHYSICIAN ASSISTANT

## 2020-01-17 PROCEDURE — 3075F PR MOST RECENT SYSTOLIC BLOOD PRESS GE 130-139MM HG: ICD-10-PCS | Mod: CPTII,S$GLB,, | Performed by: PHYSICIAN ASSISTANT

## 2020-01-17 PROCEDURE — 99999 PR PBB SHADOW E&M-EST. PATIENT-LVL IV: CPT | Mod: PBBFAC,,, | Performed by: PHYSICIAN ASSISTANT

## 2020-01-17 PROCEDURE — 3008F PR BODY MASS INDEX (BMI) DOCUMENTED: ICD-10-PCS | Mod: CPTII,S$GLB,, | Performed by: PHYSICIAN ASSISTANT

## 2020-01-17 PROCEDURE — 3079F DIAST BP 80-89 MM HG: CPT | Mod: CPTII,S$GLB,, | Performed by: PHYSICIAN ASSISTANT

## 2020-01-17 PROCEDURE — 99215 PR OFFICE/OUTPT VISIT, EST, LEVL V, 40-54 MIN: ICD-10-PCS | Mod: S$GLB,,, | Performed by: PHYSICIAN ASSISTANT

## 2020-01-17 PROCEDURE — 3075F SYST BP GE 130 - 139MM HG: CPT | Mod: CPTII,S$GLB,, | Performed by: PHYSICIAN ASSISTANT

## 2020-01-17 PROCEDURE — 99215 OFFICE O/P EST HI 40 MIN: CPT | Mod: S$GLB,,, | Performed by: PHYSICIAN ASSISTANT

## 2020-01-17 PROCEDURE — 3008F BODY MASS INDEX DOCD: CPT | Mod: CPTII,S$GLB,, | Performed by: PHYSICIAN ASSISTANT

## 2020-01-17 NOTE — PROGRESS NOTES
"Patient Name: Romain French    : 1980  MRN: [id number]    Subjective:  Romain is a 39 y.o. male who presents today for:    Chief Complaint   Patient presents with    Depression       HPI  Patient has multiple medical diagnoses as listed below in the history. Patient is new to me, but known to the clinic. He presents for evaluation of depression and metal health. He reports depressed mood and feeling hopeless for several months. He describes as "wanting it all to end," meaning his problems. He denies SI or having a plan. He reports recent long custody case with ex-wife for oldest daughter. It has been affecting his current marriage. He believes there has been recent infidelity in current marriage and he now struggles with trust issues in his marriage. He reports having a lot of resentment toward his current wife over his custody battles with older daughter. His symptoms are worse when he is alone and at night. He reports being very tearful and not feeling "very strong." His symptoms are better when he stays busy and works a lot. He does like to write as a form of releasing his emotions. He reports having a good relationship with his three younger children who live with him. He does not have a relationship with the oldest daughter anymore and this seems to be the major exacerbating factor to his symptoms. He denies excessive alcohol consumption or drug abuse. He denies physical abuse or self harm. He denies past history of depression. He has never taken any antidepression medications. He has never been to therapy or counseling. He states his wife has suggested couples therapy, but he would prefer to seek individual therapy prior to couples. He is not ready to start medication at this time, but is not completely against it. He is simply just seeking help with therapy at this time. He denies SI or HI. He feels safe at home. He reports having a good relationship with his parents, but that it is not always easy to speak " to them with regard to his mental health. He does not feel has though he has anyone to confide in for help.     Past Medical History  Past Medical History:   Diagnosis Date    Anxiety     Hyperlipidemia     Hypertension        Past Surgical History  No past surgical history on file.    Family History  No family history on file.    Social History  Social History     Socioeconomic History    Marital status:      Spouse name: Not on file    Number of children: Not on file    Years of education: Not on file    Highest education level: Not on file   Occupational History    Not on file   Social Needs    Financial resource strain: Not on file    Food insecurity:     Worry: Not on file     Inability: Not on file    Transportation needs:     Medical: Not on file     Non-medical: Not on file   Tobacco Use    Smoking status: Never Smoker    Smokeless tobacco: Never Used   Substance and Sexual Activity    Alcohol use: Not on file    Drug use: Not on file    Sexual activity: Not on file   Lifestyle    Physical activity:     Days per week: Not on file     Minutes per session: Not on file    Stress: Not on file   Relationships    Social connections:     Talks on phone: Not on file     Gets together: Not on file     Attends Anabaptism service: Not on file     Active member of club or organization: Not on file     Attends meetings of clubs or organizations: Not on file     Relationship status: Not on file   Other Topics Concern    Not on file   Social History Narrative    Not on file       Current Medications  Current Outpatient Medications on File Prior to Visit   Medication Sig Dispense Refill    lisinopril (PRINIVIL,ZESTRIL) 40 MG tablet TAKE 1 TABLET BY MOUTH EVERY DAY 90 tablet 0    NIFEdipine (PROCARDIA-XL) 90 MG (OSM) 24 hr tablet Take 1 tablet (90 mg total) by mouth once daily. 90 tablet 3    pravastatin (PRAVACHOL) 40 MG tablet TAKE 1 TABLET BY MOUTH EVERY DAY 90 tablet 0     No current  "facility-administered medications on file prior to visit.        Allergies   Review of patient's allergies indicates:  No Known Allergies      ROS  Review of Systems   Constitutional: Negative for activity change, appetite change, fatigue and unexpected weight change.   Respiratory: Negative for shortness of breath.    Cardiovascular: Negative for chest pain.   Psychiatric/Behavioral: Positive for decreased concentration, dysphoric mood and sleep disturbance. Negative for agitation, behavioral problems, confusion, self-injury and suicidal ideas. The patient is not nervous/anxious.          Objective:    /84 (BP Location: Right arm, Patient Position: Sitting, BP Method: Large (Manual))   Pulse 78   Temp 98.5 °F (36.9 °C) (Oral)   Ht 5' 7" (1.702 m)   Wt 107.6 kg (237 lb 3.4 oz)   SpO2 99%   BMI 37.15 kg/m²     Physical Exam   Constitutional: Vital signs are normal.   Eyes: EOM are normal.   Neurological: He is alert.   Psychiatric: Judgment normal. He exhibits a depressed mood. He expresses no homicidal and no suicidal ideation. He expresses no suicidal plans and no homicidal plans.   Patient was tearful throughout visit and conversation       Assessment/Plan:  Romain French is a 39 y.o. male who presents today for :    Romain was seen today for depression.    Diagnoses and all orders for this visit:    Depression, unspecified depression type  -     Ambulatory consult to Psychiatry  Discussed and listened to the patient explain his personal stressors, relationships, legal issues, and mental health concerns. I encouraged the patient and gave reassurance that he has made the right decision to seek help. We discussed the effects of depression and how to identify the triggers; as well as various home care techniques. He does not express suicidal or homicidal ideation. He is safe at home and work. I do believe he will benefit from therapy. I explained that a referral for psychiatry will be placed and the department " requires the patient to call for scheduling of their own appointments. He is followed by his primary care physician for all other health issues, he does have upcoming annual with PCP next month. Discussed ED precautions for intensified symptoms or unsafe thoughts/feelings. Patient gave verbal understanding and agreement of plan        I spent >50% of this 40 minute encounter on counseling, coordination of care , and care plan.        Encouraged to call/return to clinic if symptoms persist or worsen    Kat Nino PA-C  [facility]

## 2020-01-17 NOTE — PATIENT INSTRUCTIONS
The Psychiatry Department requires that patients call to schedule their own appointments.    Please call [phone] to schedule an appointment with Psychiatry.

## 2020-01-21 ENCOUNTER — OFFICE VISIT (OUTPATIENT)
Dept: PSYCHIATRY | Facility: CLINIC | Age: 40
End: 2020-01-21
Payer: COMMERCIAL

## 2020-01-21 VITALS
DIASTOLIC BLOOD PRESSURE: 99 MMHG | HEART RATE: 106 BPM | BODY MASS INDEX: 37.34 KG/M2 | WEIGHT: 238.44 LBS | SYSTOLIC BLOOD PRESSURE: 143 MMHG

## 2020-01-21 DIAGNOSIS — F32.1 CURRENT MODERATE EPISODE OF MAJOR DEPRESSIVE DISORDER WITHOUT PRIOR EPISODE: Primary | ICD-10-CM

## 2020-01-21 PROCEDURE — 99999 PR PBB SHADOW E&M-EST. PATIENT-LVL II: ICD-10-PCS | Mod: PBBFAC,,, | Performed by: PSYCHIATRY & NEUROLOGY

## 2020-01-21 PROCEDURE — 99203 OFFICE O/P NEW LOW 30 MIN: CPT | Mod: S$GLB,,, | Performed by: PSYCHIATRY & NEUROLOGY

## 2020-01-21 PROCEDURE — 99203 PR OFFICE/OUTPT VISIT, NEW, LEVL III, 30-44 MIN: ICD-10-PCS | Mod: S$GLB,,, | Performed by: PSYCHIATRY & NEUROLOGY

## 2020-01-21 PROCEDURE — 3008F PR BODY MASS INDEX (BMI) DOCUMENTED: ICD-10-PCS | Mod: CPTII,S$GLB,, | Performed by: PSYCHIATRY & NEUROLOGY

## 2020-01-21 PROCEDURE — 3077F SYST BP >= 140 MM HG: CPT | Mod: CPTII,S$GLB,, | Performed by: PSYCHIATRY & NEUROLOGY

## 2020-01-21 PROCEDURE — 99999 PR PBB SHADOW E&M-EST. PATIENT-LVL II: CPT | Mod: PBBFAC,,, | Performed by: PSYCHIATRY & NEUROLOGY

## 2020-01-21 PROCEDURE — 3077F PR MOST RECENT SYSTOLIC BLOOD PRESSURE >= 140 MM HG: ICD-10-PCS | Mod: CPTII,S$GLB,, | Performed by: PSYCHIATRY & NEUROLOGY

## 2020-01-21 PROCEDURE — 3080F PR MOST RECENT DIASTOLIC BLOOD PRESSURE >= 90 MM HG: ICD-10-PCS | Mod: CPTII,S$GLB,, | Performed by: PSYCHIATRY & NEUROLOGY

## 2020-01-21 PROCEDURE — 3080F DIAST BP >= 90 MM HG: CPT | Mod: CPTII,S$GLB,, | Performed by: PSYCHIATRY & NEUROLOGY

## 2020-01-21 PROCEDURE — 3008F BODY MASS INDEX DOCD: CPT | Mod: CPTII,S$GLB,, | Performed by: PSYCHIATRY & NEUROLOGY
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
## 2020-01-29 ENCOUNTER — OFFICE VISIT (OUTPATIENT)
Dept: PSYCHIATRY | Facility: CLINIC | Age: 40
End: 2020-01-29
Payer: COMMERCIAL

## 2020-01-29 DIAGNOSIS — F32.0 MAJOR DEPRESSIVE DISORDER, SINGLE EPISODE, MILD: Primary | ICD-10-CM

## 2020-01-29 PROCEDURE — 90791 PSYCH DIAGNOSTIC EVALUATION: CPT | Mod: S$GLB,,, | Performed by: SOCIAL WORKER

## 2020-01-29 PROCEDURE — 90791 PR PSYCHIATRIC DIAGNOSTIC EVALUATION: ICD-10-PCS | Mod: S$GLB,,, | Performed by: SOCIAL WORKER
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
## 2020-03-06 ENCOUNTER — OFFICE VISIT (OUTPATIENT)
Dept: FAMILY MEDICINE | Facility: CLINIC | Age: 40
End: 2020-03-06
Payer: COMMERCIAL

## 2020-03-06 VITALS
HEIGHT: 67 IN | HEART RATE: 90 BPM | SYSTOLIC BLOOD PRESSURE: 144 MMHG | DIASTOLIC BLOOD PRESSURE: 90 MMHG | BODY MASS INDEX: 37.4 KG/M2 | WEIGHT: 238.31 LBS | OXYGEN SATURATION: 97 % | TEMPERATURE: 99 F

## 2020-03-06 DIAGNOSIS — Z11.4 ENCOUNTER FOR SCREENING FOR HUMAN IMMUNODEFICIENCY VIRUS (HIV): ICD-10-CM

## 2020-03-06 DIAGNOSIS — E78.5 HYPERLIPIDEMIA, UNSPECIFIED HYPERLIPIDEMIA TYPE: Chronic | ICD-10-CM

## 2020-03-06 DIAGNOSIS — E55.9 VITAMIN D DEFICIENCY: ICD-10-CM

## 2020-03-06 DIAGNOSIS — E66.01 SEVERE OBESITY (BMI 35.0-39.9) WITH COMORBIDITY: Chronic | ICD-10-CM

## 2020-03-06 DIAGNOSIS — Z00.00 ENCOUNTER FOR ANNUAL PHYSICAL EXAM: Primary | ICD-10-CM

## 2020-03-06 DIAGNOSIS — I10 ESSENTIAL HYPERTENSION: Chronic | ICD-10-CM

## 2020-03-06 PROCEDURE — 99214 OFFICE O/P EST MOD 30 MIN: CPT | Mod: S$GLB,,, | Performed by: NURSE PRACTITIONER

## 2020-03-06 PROCEDURE — 99999 PR PBB SHADOW E&M-EST. PATIENT-LVL III: CPT | Mod: PBBFAC,,, | Performed by: NURSE PRACTITIONER

## 2020-03-06 PROCEDURE — 3008F BODY MASS INDEX DOCD: CPT | Mod: CPTII,S$GLB,, | Performed by: NURSE PRACTITIONER

## 2020-03-06 PROCEDURE — 3008F PR BODY MASS INDEX (BMI) DOCUMENTED: ICD-10-PCS | Mod: CPTII,S$GLB,, | Performed by: NURSE PRACTITIONER

## 2020-03-06 PROCEDURE — 3080F PR MOST RECENT DIASTOLIC BLOOD PRESSURE >= 90 MM HG: ICD-10-PCS | Mod: CPTII,S$GLB,, | Performed by: NURSE PRACTITIONER

## 2020-03-06 PROCEDURE — 3077F SYST BP >= 140 MM HG: CPT | Mod: CPTII,S$GLB,, | Performed by: NURSE PRACTITIONER

## 2020-03-06 PROCEDURE — 3080F DIAST BP >= 90 MM HG: CPT | Mod: CPTII,S$GLB,, | Performed by: NURSE PRACTITIONER

## 2020-03-06 PROCEDURE — 99999 PR PBB SHADOW E&M-EST. PATIENT-LVL III: ICD-10-PCS | Mod: PBBFAC,,, | Performed by: NURSE PRACTITIONER

## 2020-03-06 PROCEDURE — 3077F PR MOST RECENT SYSTOLIC BLOOD PRESSURE >= 140 MM HG: ICD-10-PCS | Mod: CPTII,S$GLB,, | Performed by: NURSE PRACTITIONER

## 2020-03-06 PROCEDURE — 99214 PR OFFICE/OUTPT VISIT, EST, LEVL IV, 30-39 MIN: ICD-10-PCS | Mod: S$GLB,,, | Performed by: NURSE PRACTITIONER

## 2020-03-06 RX ORDER — ERGOCALCIFEROL 1.25 MG/1
50000 CAPSULE ORAL
Qty: 15 CAPSULE | Refills: 0 | Status: SHIPPED | OUTPATIENT
Start: 2020-03-06 | End: 2020-04-28

## 2020-03-06 NOTE — PATIENT INSTRUCTIONS
Prevention Guidelines, Men Ages 40 to 49  Screening tests and vaccines are an important part of managing your health. Health counseling is essential, too. Below are guidelines for these, for men ages 40 to 49. Talk with your healthcare provider to make sure youre up to date on what you need.  Screening Who needs it How often   Alcohol misuse All men in this age group At routine exams   Blood pressure All men in this age group Every 2 years if your blood pressure reading is less than 120/80 mm Hg; yearly if your systolic blood pressure reading is 120 to 139 mm Hg, or your diastolic blood pressure reading is 80 to 89 mm Hg   Depression All men in this age group At routine exams   Type 2 diabetes or prediabetes All adults beginning at age 45 and adults without symptoms at any age who are overweight or obese and have 1 or more other risk factors for diabetes At least every 3 years (yearly if blood sugar has begun to rise)   Hepatitis C Men at increased risk for infection - talk with your healthcare provider At routine exams   High cholesterol or triglycerides All men ages 35 and older, and younger men at high risk for coronary artery disease At least every 5 years   HIV All men At routine exams   Obesity All men in this age group At routine exams   Prostate cancer Starting at age 45, talk to healthcare provider about risks and benefits of digital rectal exam (BISMARK) and prostate-specific antigen (PSA) screening1 At routine exams   Syphilis Men at increased risk for infection - talk with your healthcare provider At routine exams   Tuberculosis Men at increased risk for infection - talk with your healthcare provider Check with your healthcare provider   Vision All men in this age group Every 2 to 4 years if no risk factors for eye disease2   Vaccine Who needs it How often   Chickenpox (varicella) All men in this age group who have no record of this infection or vaccine 2 doses; the second dose should be given at least 4  weeks after the first dose   Hepatitis A Men at increased risk for infection - talk with your healthcare provider 2 doses given at least 6 months apart   Hepatitis B Men at increased risk for infection - talk with your healthcare provider 3 doses over 6 months; second dose should be given 1 month after the first dose; the third dose should be given at least 2 months after the second dose and at least 4 months after the first dose   Haemophilus influenzae Type B (HIB) Men at increased risk for infection - talk with your healthcare provider 1 to 3 doses   Influenza (flu) All men in this age group Once a year   Measles, mumps, rubella (MMR) All men in this age group who have no record of these infections or vaccines 1 or 2 doses   Meningococcal Men at increased risk for infection - talk with your healthcare provider 1 or more doses   Pneumococcal conjugate vaccine (PCV13) and pneumococcal polysaccharide vaccine (PPSV23) Men at increased risk for infection - talk with your healthcare provider PCV13: 1 dose ages 19 to 65 (protects against 13 types of pneumococcal bacteria)     PPSV23: 1 to 2 doses through age 64, or 1 dose at 65 or older (protects against 23 types of pneumococcal bacteria)      Tetanus/diphtheria/  pertussis (Td/Tdap) booster All men in this age group Td every 10 years, or a one-time dose of Tdap instead of a Td booster after age 18, then Td every 10 years   Counseling Who needs it How often   Diet and exercise Men who are overweight or obese When diagnosed, and then at routine exams   Sexually transmitted infection prevention Men at increased risk for infection - talk with your healthcare provider At routine exams   Use of daily aspirin Men ages 45 to 79 at risk for cardiovascular health problems At routine exams   Use of tobacco and the health effects it can cause All men in this age group Every exam   [address] [place] [address] [facility]   [facility] [employer]  Date Last Reviewed:  2/1/2017  © 7453-3569 [employer]. [address] [place] [address], [place] [address]. All rights reserved. This information is not intended as a substitute for professional medical care. Always follow your healthcare professional's instructions.

## 2020-03-06 NOTE — PROGRESS NOTES
Subjective:       Patient ID: Romain French is a 39 y.o. male.    Chief Complaint: Annual Exam (patient is here for his annual states his labs from last year shows low Vitamin D, has concerns)    Romain French is a 39 y.o. male with multiple medical diagnoses as listed in the medical history and problem list that presents for annual and follow-up on  Hypertension. No other concerns today         Past Medical History:   Diagnosis Date    Anxiety     Hyperlipidemia     Hypertension      History reviewed. No pertinent surgical history.  Social History     Socioeconomic History    Marital status:      Spouse name: Not on file    Number of children: Not on file    Years of education: Not on file    Highest education level: Not on file   Occupational History    Not on file   Social Needs    Financial resource strain: Not on file    Food insecurity:     Worry: Not on file     Inability: Not on file    Transportation needs:     Medical: Not on file     Non-medical: Not on file   Tobacco Use    Smoking status: Never Smoker    Smokeless tobacco: Never Used   Substance and Sexual Activity    Alcohol use: Not on file    Drug use: Not on file    Sexual activity: Not on file   Lifestyle    Physical activity:     Days per week: Not on file     Minutes per session: Not on file    Stress: Not on file   Relationships    Social connections:     Talks on phone: Not on file     Gets together: Not on file     Attends Sikh service: Not on file     Active member of club or organization: Not on file     Attends meetings of clubs or organizations: Not on file     Relationship status: Not on file   Other Topics Concern    Not on file   Social History Narrative    Not on file     History reviewed. No pertinent family history.    Review of Systems   Constitutional: Negative for chills, diaphoresis, fatigue and fever.   HENT: Negative for congestion, postnasal drip, rhinorrhea, sinus pressure and sneezing.     Respiratory: Negative for cough, chest tightness and shortness of breath.    Cardiovascular: Negative for chest pain, palpitations and leg swelling.   Gastrointestinal: Negative for abdominal pain, diarrhea, nausea and vomiting.   Genitourinary: Negative for decreased urine volume and difficulty urinating.   Musculoskeletal: Negative for arthralgias, back pain and myalgias.   Skin: Negative for color change and rash.   Neurological: Negative for dizziness, weakness, light-headedness and headaches.       Objective:      Physical Exam   Constitutional: He is oriented to person, place, and time. Vital signs are normal. He appears well-developed and well-nourished.   HENT:   Head: Normocephalic and atraumatic.   Right Ear: External ear normal.   Left Ear: External ear normal.   Nose: Nose normal.   Mouth/Throat: Oropharynx is clear and moist. No oropharyngeal exudate.   Cardiovascular: Normal rate, regular rhythm and normal heart sounds.   Pulmonary/Chest: Effort normal and breath sounds normal.   Abdominal: Soft. Bowel sounds are normal.   Neurological: He is alert and oriented to person, place, and time.   Skin: Skin is warm, dry and intact.   Psychiatric: He has a normal mood and affect.       Assessment:       1. Encounter for annual physical exam    2. Vitamin D deficiency    3. Severe obesity (BMI 35.0-39.9) with comorbidity    4. Hyperlipidemia, unspecified hyperlipidemia type    5. Essential hypertension    6. Encounter for screening for human immunodeficiency virus (HIV)        Plan:       Romain was seen today for annual exam.    Diagnoses and all orders for this visit:    Encounter for annual physical exam  -     CBC auto differential; Future  -     Comprehensive metabolic panel; Future  -     TSH; Future  -     CBC auto differential  -     Comprehensive metabolic panel  -     TSH    Vitamin D deficiency  -     Vitamin D; Future  -     Vitamin D  -     ergocalciferol (ERGOCALCIFEROL) 50,000 unit Cap; Take 1  capsule (50,000 Units total) by mouth every 7 days.    Severe obesity (BMI 35.0-39.9) with comorbidity    Hyperlipidemia, unspecified hyperlipidemia type  -     Lipid panel; Future  -     Lipid panel    Essential hypertension  -     Cancel: CBC auto differential; Future  -     Cancel: Comprehensive metabolic panel; Future  -     CBC auto differential; Future  -     Comprehensive metabolic panel; Future  -     CBC auto differential  -     Comprehensive metabolic panel    Encounter for screening for human immunodeficiency virus (HIV)  -     HIV 1/2 Ag/Ab (4th Gen); Future  -     HIV 1/2 Ag/Ab (4th Gen)

## 2020-03-16 DIAGNOSIS — I10 ESSENTIAL HYPERTENSION: Chronic | ICD-10-CM

## 2020-03-16 DIAGNOSIS — E78.5 HYPERLIPIDEMIA, UNSPECIFIED HYPERLIPIDEMIA TYPE: Chronic | ICD-10-CM

## 2020-03-16 RX ORDER — PRAVASTATIN SODIUM 40 MG/1
40 TABLET ORAL DAILY
Qty: 90 TABLET | Refills: 0 | Status: SHIPPED | OUTPATIENT
Start: 2020-03-16 | End: 2020-10-24 | Stop reason: SDUPTHER

## 2020-03-16 RX ORDER — LISINOPRIL 40 MG/1
40 TABLET ORAL DAILY
Qty: 90 TABLET | Refills: 0 | Status: SHIPPED | OUTPATIENT
Start: 2020-03-16 | End: 2020-09-29

## 2020-04-28 DIAGNOSIS — E55.9 VITAMIN D DEFICIENCY: ICD-10-CM

## 2020-04-28 RX ORDER — ERGOCALCIFEROL 1.25 MG/1
CAPSULE ORAL
Qty: 12 CAPSULE | Refills: 1 | Status: SHIPPED | OUTPATIENT
Start: 2020-04-28 | End: 2020-06-19

## 2020-05-28 ENCOUNTER — OFFICE VISIT (OUTPATIENT)
Dept: FAMILY MEDICINE | Facility: CLINIC | Age: 40
End: 2020-05-28
Payer: COMMERCIAL

## 2020-05-28 VITALS
SYSTOLIC BLOOD PRESSURE: 130 MMHG | HEIGHT: 67 IN | OXYGEN SATURATION: 98 % | WEIGHT: 251.31 LBS | RESPIRATION RATE: 20 BRPM | BODY MASS INDEX: 39.44 KG/M2 | TEMPERATURE: 99 F | HEART RATE: 101 BPM | DIASTOLIC BLOOD PRESSURE: 98 MMHG

## 2020-05-28 DIAGNOSIS — K21.9 GASTROESOPHAGEAL REFLUX DISEASE, ESOPHAGITIS PRESENCE NOT SPECIFIED: ICD-10-CM

## 2020-05-28 DIAGNOSIS — R10.11 RUQ PAIN: Primary | ICD-10-CM

## 2020-05-28 PROCEDURE — 3008F BODY MASS INDEX DOCD: CPT | Mod: CPTII,S$GLB,, | Performed by: PHYSICIAN ASSISTANT

## 2020-05-28 PROCEDURE — 99214 OFFICE O/P EST MOD 30 MIN: CPT | Mod: S$GLB,,, | Performed by: PHYSICIAN ASSISTANT

## 2020-05-28 PROCEDURE — 3075F PR MOST RECENT SYSTOLIC BLOOD PRESS GE 130-139MM HG: ICD-10-PCS | Mod: CPTII,S$GLB,, | Performed by: PHYSICIAN ASSISTANT

## 2020-05-28 PROCEDURE — 99999 PR PBB SHADOW E&M-EST. PATIENT-LVL IV: ICD-10-PCS | Mod: PBBFAC,,, | Performed by: PHYSICIAN ASSISTANT

## 2020-05-28 PROCEDURE — 99214 PR OFFICE/OUTPT VISIT, EST, LEVL IV, 30-39 MIN: ICD-10-PCS | Mod: S$GLB,,, | Performed by: PHYSICIAN ASSISTANT

## 2020-05-28 PROCEDURE — 3008F PR BODY MASS INDEX (BMI) DOCUMENTED: ICD-10-PCS | Mod: CPTII,S$GLB,, | Performed by: PHYSICIAN ASSISTANT

## 2020-05-28 PROCEDURE — 3075F SYST BP GE 130 - 139MM HG: CPT | Mod: CPTII,S$GLB,, | Performed by: PHYSICIAN ASSISTANT

## 2020-05-28 PROCEDURE — 3080F DIAST BP >= 90 MM HG: CPT | Mod: CPTII,S$GLB,, | Performed by: PHYSICIAN ASSISTANT

## 2020-05-28 PROCEDURE — 3080F PR MOST RECENT DIASTOLIC BLOOD PRESSURE >= 90 MM HG: ICD-10-PCS | Mod: CPTII,S$GLB,, | Performed by: PHYSICIAN ASSISTANT

## 2020-05-28 PROCEDURE — 99999 PR PBB SHADOW E&M-EST. PATIENT-LVL IV: CPT | Mod: PBBFAC,,, | Performed by: PHYSICIAN ASSISTANT

## 2020-05-28 RX ORDER — PANTOPRAZOLE SODIUM 20 MG/1
20 TABLET, DELAYED RELEASE ORAL DAILY
Qty: 30 TABLET | Refills: 0 | Status: SHIPPED | OUTPATIENT
Start: 2020-05-28 | End: 2020-05-29

## 2020-05-28 RX ORDER — FAMOTIDINE 40 MG/1
40 TABLET, FILM COATED ORAL 2 TIMES DAILY PRN
Qty: 15 TABLET | Refills: 5 | Status: SHIPPED | OUTPATIENT
Start: 2020-05-28 | End: 2020-06-19

## 2020-05-28 NOTE — PROGRESS NOTES
Subjective:       Patient ID: Romain French is a 40 y.o. male with multiple medical diagnoses as listed in the medical history and problem list that presents for Abdominal Pain (x 1 day)  .    Chief Complaint: Abdominal Pain (x 1 day)      Abdominal Pain   This is a new problem. The current episode started in the past 7 days (2 days worse today this am ). The problem has been gradually worsening. The pain is located in the RUQ. The pain is at a severity of 3/10. The quality of the pain is dull and aching. The abdominal pain does not radiate. Associated symptoms include anorexia, constipation (little pellets sometimes today ), diarrhea and flatus. Pertinent negatives include no belching, melena, nausea or vomiting. Associated symptoms comments: Beer   Liquor   Wine  4 drink a day   Weekends more  Fatty oil food  Caffeine      Burning in chest   Dry heave. Exacerbated by: eating and pressure to the area  Relieved by: by laying on left side  Treatments tried: pepto bismo      Review of Systems   Gastrointestinal: Positive for abdominal pain, anorexia, constipation (little pellets sometimes today ), diarrhea and flatus. Negative for melena, nausea and vomiting.         PAST MEDICAL HISTORY:  Past Medical History:   Diagnosis Date    Anxiety     Hyperlipidemia     Hypertension        SOCIAL HISTORY:  Social History     Socioeconomic History    Marital status:      Spouse name: Not on file    Number of children: Not on file    Years of education: Not on file    Highest education level: Not on file   Occupational History    Not on file   Social Needs    Financial resource strain: Not on file    Food insecurity:     Worry: Not on file     Inability: Not on file    Transportation needs:     Medical: Not on file     Non-medical: Not on file   Tobacco Use    Smoking status: Never Smoker    Smokeless tobacco: Never Used   Substance and Sexual Activity    Alcohol use: Not on file    Drug use: Not on file     "Sexual activity: Not on file   Lifestyle    Physical activity:     Days per week: Not on file     Minutes per session: Not on file    Stress: Not on file   Relationships    Social connections:     Talks on phone: Not on file     Gets together: Not on file     Attends Congregational service: Not on file     Active member of club or organization: Not on file     Attends meetings of clubs or organizations: Not on file     Relationship status: Not on file   Other Topics Concern    Not on file   Social History Narrative    Not on file       ALLERGIES AND MEDICATIONS: updated and reviewed.  Review of patient's allergies indicates:  No Known Allergies  Current Outpatient Medications   Medication Sig Dispense Refill    ergocalciferol (ERGOCALCIFEROL) 50,000 unit Cap TAKE 1 CAPSULE BY MOUTH EVERY WEEK 12 capsule 1    lisinopriL (PRINIVIL,ZESTRIL) 40 MG tablet Take 1 tablet (40 mg total) by mouth once daily. 90 tablet 0    NIFEdipine (PROCARDIA-XL) 90 MG (OSM) 24 hr tablet Take 1 tablet (90 mg total) by mouth once daily. 90 tablet 3    pravastatin (PRAVACHOL) 40 MG tablet Take 1 tablet (40 mg total) by mouth once daily. 90 tablet 0    famotidine (PEPCID) 40 MG tablet Take 1 tablet (40 mg total) by mouth 2 (two) times daily as needed for Heartburn. 15 tablet 5    pantoprazole (PROTONIX) 20 MG tablet Take 1 tablet (20 mg total) by mouth once daily. For 14 days 30 tablet 0     No current facility-administered medications for this visit.          Objective:   BP (!) 130/98 (BP Location: Left arm, Patient Position: Sitting, BP Method: Large (Manual))   Pulse 101   Temp 98.6 °F (37 °C) (Oral)   Resp 20   Ht 5' 7" (1.702 m)   Wt 114 kg (251 lb 5.2 oz)   SpO2 98%   BMI 39.36 kg/m²      Physical Exam   Constitutional: He is oriented to person, place, and time.   Morbidly obese   HENT:   Head: Normocephalic and atraumatic.   Eyes: No scleral icterus.   Cardiovascular: Normal rate and regular rhythm.   Pulmonary/Chest: " Effort normal and breath sounds normal.   Abdominal: Bowel sounds are normal. There is tenderness in the right upper quadrant. There is positive Villegas's sign. There is no rigidity, no rebound, no guarding, no CVA tenderness and no tenderness at McBurney's point. No hernia.   Neurological: He is alert and oriented to person, place, and time.           Assessment:       1. RUQ pain    2. Gastroesophageal reflux disease, esophagitis presence not specified        Plan:       RUQ pain  -     US Abdomen Limited; Future; Expected date: 05/28/2020  Discussed avoiding oil greasy foods  -will contact with results     Gastroesophageal reflux disease, esophagitis presence not specified  -     pantoprazole (PROTONIX) 20 MG tablet; Take 1 tablet (20 mg total) by mouth once daily. For 14 days  Dispense: 30 tablet; Refill: 0  -     famotidine (PEPCID) 40 MG tablet; Take 1 tablet (40 mg total) by mouth 2 (two) times daily as needed for Heartburn.  Dispense: 15 tablet; Refill: 5  Dietary changes discussed           No follow-ups on file.

## 2020-05-29 ENCOUNTER — PATIENT MESSAGE (OUTPATIENT)
Dept: ADMINISTRATIVE | Facility: HOSPITAL | Age: 40
End: 2020-05-29

## 2020-05-29 RX ORDER — PANTOPRAZOLE SODIUM 20 MG/1
TABLET, DELAYED RELEASE ORAL
Qty: 90 TABLET | Refills: 0 | Status: SHIPPED | OUTPATIENT
Start: 2020-05-29 | End: 2020-06-19

## 2020-06-04 ENCOUNTER — HOSPITAL ENCOUNTER (OUTPATIENT)
Dept: RADIOLOGY | Facility: HOSPITAL | Age: 40
Discharge: HOME | End: 2020-06-04
Attending: PHYSICIAN ASSISTANT
Payer: COMMERCIAL

## 2020-06-04 ENCOUNTER — PATIENT MESSAGE (OUTPATIENT)
Dept: FAMILY MEDICINE | Facility: CLINIC | Age: 40
End: 2020-06-04

## 2020-06-04 ENCOUNTER — TELEPHONE (OUTPATIENT)
Dept: FAMILY MEDICINE | Facility: CLINIC | Age: 40
End: 2020-06-04

## 2020-06-04 DIAGNOSIS — R10.11 RUQ PAIN: ICD-10-CM

## 2020-06-04 DIAGNOSIS — K82.9 GALLBLADDER PAIN: Primary | ICD-10-CM

## 2020-06-04 PROCEDURE — 76705 US ABDOMEN LIMITED: ICD-10-PCS | Mod: 26,,, | Performed by: RADIOLOGY

## 2020-06-04 PROCEDURE — 76705 ECHO EXAM OF ABDOMEN: CPT | Mod: 26,,, | Performed by: RADIOLOGY

## 2020-06-04 PROCEDURE — 76705 ECHO EXAM OF ABDOMEN: CPT | Mod: TC

## 2020-06-04 NOTE — TELEPHONE ENCOUNTER
----- Message from CATRACHITO Martinez sent at 6/4/2020  4:43 PM CDT -----  US shows polylp or small stone  If still in pain with diet change recommend surgery referral  Let me know if he wants it

## 2020-06-05 ENCOUNTER — OFFICE VISIT (OUTPATIENT)
Dept: SURGERY | Facility: CLINIC | Age: 40
End: 2020-06-05
Payer: COMMERCIAL

## 2020-06-05 ENCOUNTER — PATIENT OUTREACH (OUTPATIENT)
Dept: ADMINISTRATIVE | Facility: OTHER | Age: 40
End: 2020-06-05

## 2020-06-05 VITALS
HEART RATE: 80 BPM | WEIGHT: 248.38 LBS | DIASTOLIC BLOOD PRESSURE: 79 MMHG | HEIGHT: 67 IN | SYSTOLIC BLOOD PRESSURE: 141 MMHG | BODY MASS INDEX: 38.98 KG/M2

## 2020-06-05 DIAGNOSIS — K82.9 GALLBLADDER PAIN: ICD-10-CM

## 2020-06-05 PROCEDURE — 99999 PR PBB SHADOW E&M-EST. PATIENT-LVL IV: CPT | Mod: PBBFAC,,, | Performed by: SURGERY

## 2020-06-05 PROCEDURE — 99244 PR OFFICE CONSULTATION,LEVEL IV: ICD-10-PCS | Mod: S$GLB,,, | Performed by: SURGERY

## 2020-06-05 PROCEDURE — 99244 OFF/OP CNSLTJ NEW/EST MOD 40: CPT | Mod: S$GLB,,, | Performed by: SURGERY

## 2020-06-05 PROCEDURE — 99999 PR PBB SHADOW E&M-EST. PATIENT-LVL IV: ICD-10-PCS | Mod: PBBFAC,,, | Performed by: SURGERY

## 2020-06-05 RX ORDER — SODIUM CHLORIDE 9 MG/ML
INJECTION, SOLUTION INTRAVENOUS CONTINUOUS
Status: CANCELLED | OUTPATIENT
Start: 2020-06-05

## 2020-06-05 RX ORDER — LIDOCAINE HYDROCHLORIDE 10 MG/ML
1 INJECTION, SOLUTION EPIDURAL; INFILTRATION; INTRACAUDAL; PERINEURAL ONCE
Status: CANCELLED | OUTPATIENT
Start: 2020-06-05 | End: 2020-06-05

## 2020-06-05 NOTE — H&P
History & Physical    SUBJECTIVE:     History of Present Illness:  Patient is a 40 y.o. male presents with right upper quadrant pain. Onset of symptoms was gradual starting 1 month ago with gradually worsening course since that time. Patient denies nausea or vomiting or admission to hospital for pain. Symptoms are aggravated by eating fatty foods and movement. Symptoms improve with pain medication.  No history of jaundice.  He has had this soreness in the area for years but approximately 1 month ago it became very severe.     Chief Complaint   Patient presents with    Consult    Abdominal Pain       Review of patient's allergies indicates:  No Known Allergies    Current Outpatient Medications   Medication Sig Dispense Refill    ergocalciferol (ERGOCALCIFEROL) 50,000 unit Cap TAKE 1 CAPSULE BY MOUTH EVERY WEEK 12 capsule 1    famotidine (PEPCID) 40 MG tablet Take 1 tablet (40 mg total) by mouth 2 (two) times daily as needed for Heartburn. 15 tablet 5    lisinopriL (PRINIVIL,ZESTRIL) 40 MG tablet Take 1 tablet (40 mg total) by mouth once daily. 90 tablet 0    NIFEdipine (PROCARDIA-XL) 90 MG (OSM) 24 hr tablet Take 1 tablet (90 mg total) by mouth once daily. 90 tablet 3    pantoprazole (PROTONIX) 20 MG tablet TAKE 1 TABLET BY MOUTH ONCE DAILY FOR 14 DAYS 90 tablet 0    pravastatin (PRAVACHOL) 40 MG tablet Take 1 tablet (40 mg total) by mouth once daily. 90 tablet 0     No current facility-administered medications for this visit.        Past Medical History:   Diagnosis Date    Anxiety     Hyperlipidemia     Hypertension      History reviewed. No pertinent surgical history.  History reviewed. No pertinent family history.  Social History     Tobacco Use    Smoking status: Never Smoker    Smokeless tobacco: Never Used   Substance Use Topics    Alcohol use: Not on file    Drug use: Not on file        Review of Systems:  Review of Systems   Constitutional: Negative for chills and fever.   HENT: Negative.   "  Eyes: Negative.    Respiratory: Negative for cough, chest tightness and shortness of breath.    Cardiovascular: Negative.    Gastrointestinal: Positive for abdominal pain (ruq, worse with food). Negative for blood in stool, constipation, diarrhea, nausea and vomiting.   Endocrine: Negative for cold intolerance and heat intolerance.   Genitourinary: Negative.    Musculoskeletal: Negative.    Skin: Negative.  Negative for rash.   Neurological: Negative for dizziness, syncope and light-headedness.   Psychiatric/Behavioral: Negative for agitation, confusion and hallucinations.       OBJECTIVE:     Vital Signs (Most Recent)  Pulse: 80 (06/05/20 1400)  BP: (!) 141/79 (06/05/20 1400)  5' 7" (1.702 m)  112.6 kg (248 lb 5.6 oz)     Physical Exam:  Physical Exam   Constitutional: He is oriented to person, place, and time. He appears well-developed and well-nourished. No distress.   HENT:   Head: Normocephalic and atraumatic.   Eyes: Pupils are equal, round, and reactive to light. Conjunctivae and EOM are normal.   Neck: Normal range of motion. Neck supple.   Cardiovascular: Normal rate, regular rhythm and normal heart sounds. Exam reveals no gallop and no friction rub.   No murmur heard.  Pulmonary/Chest: Effort normal and breath sounds normal. No stridor. No respiratory distress. He has no wheezes.   Abdominal: Soft. Bowel sounds are normal. He exhibits no distension. There is tenderness. A hernia (small umbilical hernia) is present.   Musculoskeletal: Normal range of motion.   Neurological: He is alert and oriented to person, place, and time. No cranial nerve deficit.   Skin: Skin is warm and dry. No rash noted. He is not diaphoretic.   Psychiatric: He has a normal mood and affect. His behavior is normal.     Diagnostic Results:  US: Reviewed  fatty liver, small gallstone vs polyp.    ASSESSMENT/PLAN:     A 40-year-old obese  man with right upper quadrant pain and ultrasound demonstrating small polyp " versus gallstone    PLAN:Plan     To OR for lap desi 6/24/20.

## 2020-06-05 NOTE — H&P (VIEW-ONLY)
History & Physical    SUBJECTIVE:     History of Present Illness:  Patient is a 40 y.o. male presents with right upper quadrant pain. Onset of symptoms was gradual starting 1 month ago with gradually worsening course since that time. Patient denies nausea or vomiting or admission to hospital for pain. Symptoms are aggravated by eating fatty foods and movement. Symptoms improve with pain medication.  No history of jaundice.  He has had this soreness in the area for years but approximately 1 month ago it became very severe.     Chief Complaint   Patient presents with    Consult    Abdominal Pain       Review of patient's allergies indicates:  No Known Allergies    Current Outpatient Medications   Medication Sig Dispense Refill    ergocalciferol (ERGOCALCIFEROL) 50,000 unit Cap TAKE 1 CAPSULE BY MOUTH EVERY WEEK 12 capsule 1    famotidine (PEPCID) 40 MG tablet Take 1 tablet (40 mg total) by mouth 2 (two) times daily as needed for Heartburn. 15 tablet 5    lisinopriL (PRINIVIL,ZESTRIL) 40 MG tablet Take 1 tablet (40 mg total) by mouth once daily. 90 tablet 0    NIFEdipine (PROCARDIA-XL) 90 MG (OSM) 24 hr tablet Take 1 tablet (90 mg total) by mouth once daily. 90 tablet 3    pantoprazole (PROTONIX) 20 MG tablet TAKE 1 TABLET BY MOUTH ONCE DAILY FOR 14 DAYS 90 tablet 0    pravastatin (PRAVACHOL) 40 MG tablet Take 1 tablet (40 mg total) by mouth once daily. 90 tablet 0     No current facility-administered medications for this visit.        Past Medical History:   Diagnosis Date    Anxiety     Hyperlipidemia     Hypertension      History reviewed. No pertinent surgical history.  History reviewed. No pertinent family history.  Social History     Tobacco Use    Smoking status: Never Smoker    Smokeless tobacco: Never Used   Substance Use Topics    Alcohol use: Not on file    Drug use: Not on file        Review of Systems:  Review of Systems   Constitutional: Negative for chills and fever.   HENT: Negative.   "  Eyes: Negative.    Respiratory: Negative for cough, chest tightness and shortness of breath.    Cardiovascular: Negative.    Gastrointestinal: Positive for abdominal pain (ruq, worse with food). Negative for blood in stool, constipation, diarrhea, nausea and vomiting.   Endocrine: Negative for cold intolerance and heat intolerance.   Genitourinary: Negative.    Musculoskeletal: Negative.    Skin: Negative.  Negative for rash.   Neurological: Negative for dizziness, syncope and light-headedness.   Psychiatric/Behavioral: Negative for agitation, confusion and hallucinations.       OBJECTIVE:     Vital Signs (Most Recent)  Pulse: 80 (06/05/20 1400)  BP: (!) 141/79 (06/05/20 1400)  5' 7" (1.702 m)  112.6 kg (248 lb 5.6 oz)     Physical Exam:  Physical Exam   Constitutional: He is oriented to person, place, and time. He appears well-developed and well-nourished. No distress.   HENT:   Head: Normocephalic and atraumatic.   Eyes: Pupils are equal, round, and reactive to light. Conjunctivae and EOM are normal.   Neck: Normal range of motion. Neck supple.   Cardiovascular: Normal rate, regular rhythm and normal heart sounds. Exam reveals no gallop and no friction rub.   No murmur heard.  Pulmonary/Chest: Effort normal and breath sounds normal. No stridor. No respiratory distress. He has no wheezes.   Abdominal: Soft. Bowel sounds are normal. He exhibits no distension. There is tenderness. A hernia (small umbilical hernia) is present.   Musculoskeletal: Normal range of motion.   Neurological: He is alert and oriented to person, place, and time. No cranial nerve deficit.   Skin: Skin is warm and dry. No rash noted. He is not diaphoretic.   Psychiatric: He has a normal mood and affect. His behavior is normal.     Diagnostic Results:  US: Reviewed  fatty liver, small gallstone vs polyp.    ASSESSMENT/PLAN:     A 40-year-old obese  man with right upper quadrant pain and ultrasound demonstrating small polyp " versus gallstone    PLAN:Plan     To OR for lap deis 6/24/20.

## 2020-06-05 NOTE — LETTER
June 5, 2020      CATRACHITO Martinez  [address]  [place] [address]           [place] [facility] General [facility] [address] [place] [address]  Phone: [phone]          Patient: Romain French   MR Number: [id number]   YOB: 1980   Date of Visit: 6/5/2020       Dear Clary Jones:    Thank you for referring Romain French to me for evaluation. Attached you will find relevant portions of my assessment and plan of care.    If you have questions, please do not hesitate to call me. I look forward to following Romain French along with you.    Sincerely,    Martin Kee MD    Enclosure  CC:  No Recipients    If you would like to receive this communication electronically, please contact [email or web address] or [phone] to request more information on [email or web address] Link access.    For providers and/or their staff who would like to refer a patient to [facility], please contact us through our one-stop-shop provider referral line, Chris Thakur, at [phone].    If you feel you have received this communication in error or would no longer like to receive these types of communications, please e-mail [email or web address]

## 2020-06-17 ENCOUNTER — HOSPITAL ENCOUNTER (OUTPATIENT)
Dept: PREADMISSION TESTING | Facility: HOSPITAL | Age: 40
Discharge: HOME | End: 2020-06-17
Attending: SURGERY
Payer: COMMERCIAL

## 2020-06-17 ENCOUNTER — HOSPITAL ENCOUNTER (OUTPATIENT)
Dept: RADIOLOGY | Facility: HOSPITAL | Age: 40
Discharge: HOME | End: 2020-06-17
Attending: SURGERY
Payer: COMMERCIAL

## 2020-06-17 VITALS
OXYGEN SATURATION: 99 % | HEIGHT: 67 IN | DIASTOLIC BLOOD PRESSURE: 106 MMHG | WEIGHT: 248.88 LBS | SYSTOLIC BLOOD PRESSURE: 166 MMHG | RESPIRATION RATE: 18 BRPM | BODY MASS INDEX: 39.06 KG/M2 | TEMPERATURE: 97 F | HEART RATE: 98 BPM

## 2020-06-17 DIAGNOSIS — Z01.818 PREOP TESTING: Primary | ICD-10-CM

## 2020-06-17 DIAGNOSIS — K82.9 GALLBLADDER PAIN: ICD-10-CM

## 2020-06-17 LAB
ALBUMIN SERPL BCP-MCNC: 4.5 G/DL (ref 3.5–5.2)
ALP SERPL-CCNC: 91 U/L (ref 55–135)
ALT SERPL W/O P-5'-P-CCNC: 40 U/L (ref 10–44)
ANION GAP SERPL CALC-SCNC: 5 MMOL/L (ref 8–16)
AST SERPL-CCNC: 26 U/L (ref 10–40)
BASOPHILS # BLD AUTO: 0.08 K/UL (ref 0–0.2)
BASOPHILS NFR BLD: 1.1 % (ref 0–1.9)
BILIRUB SERPL-MCNC: 0.4 MG/DL (ref 0.1–1)
BUN SERPL-MCNC: 14 MG/DL (ref 6–20)
CALCIUM SERPL-MCNC: 9.2 MG/DL (ref 8.7–10.5)
CHLORIDE SERPL-SCNC: 104 MMOL/L (ref 95–110)
CO2 SERPL-SCNC: 29 MMOL/L (ref 23–29)
CREAT SERPL-MCNC: 1.2 MG/DL (ref 0.5–1.4)
DIFFERENTIAL METHOD: NORMAL
EOSINOPHIL # BLD AUTO: 0.3 K/UL (ref 0–0.5)
EOSINOPHIL NFR BLD: 4 % (ref 0–8)
ERYTHROCYTE [DISTWIDTH] IN BLOOD BY AUTOMATED COUNT: 12.4 % (ref 11.5–14.5)
EST. GFR  (AFRICAN AMERICAN): >60 ML/MIN/1.73 M^2
EST. GFR  (NON AFRICAN AMERICAN): >60 ML/MIN/1.73 M^2
GLUCOSE SERPL-MCNC: 81 MG/DL (ref 70–110)
HCT VFR BLD AUTO: 47.2 % (ref 40–54)
HGB BLD-MCNC: 16.1 G/DL (ref 14–18)
IMM GRANULOCYTES # BLD AUTO: 0.03 K/UL (ref 0–0.04)
IMM GRANULOCYTES NFR BLD AUTO: 0.4 % (ref 0–0.5)
LYMPHOCYTES # BLD AUTO: 2.5 K/UL (ref 1–4.8)
LYMPHOCYTES NFR BLD: 32.5 % (ref 18–48)
MCH RBC QN AUTO: 30.3 PG (ref 27–31)
MCHC RBC AUTO-ENTMCNC: 34.1 G/DL (ref 32–36)
MCV RBC AUTO: 89 FL (ref 82–98)
MONOCYTES # BLD AUTO: 0.8 K/UL (ref 0.3–1)
MONOCYTES NFR BLD: 11.1 % (ref 4–15)
NEUTROPHILS # BLD AUTO: 3.8 K/UL (ref 1.8–7.7)
NEUTROPHILS NFR BLD: 50.9 % (ref 38–73)
NRBC BLD-RTO: 0 /100 WBC
PLATELET # BLD AUTO: 245 K/UL (ref 150–350)
PMV BLD AUTO: 10.4 FL (ref 9.2–12.9)
POTASSIUM SERPL-SCNC: 4 MMOL/L (ref 3.5–5.1)
PROT SERPL-MCNC: 8 G/DL (ref 6–8.4)
RBC # BLD AUTO: 5.31 M/UL (ref 4.6–6.2)
SODIUM SERPL-SCNC: 138 MMOL/L (ref 136–145)
WBC # BLD AUTO: 7.54 K/UL (ref 3.9–12.7)

## 2020-06-17 PROCEDURE — 71045 X-RAY EXAM CHEST 1 VIEW: CPT | Mod: 26,,, | Performed by: RADIOLOGY

## 2020-06-17 PROCEDURE — 93010 ELECTROCARDIOGRAM REPORT: CPT | Mod: ,,, | Performed by: INTERNAL MEDICINE

## 2020-06-17 PROCEDURE — 71045 XR CHEST 1 VIEW PRE-OP: ICD-10-PCS | Mod: 26,,, | Performed by: RADIOLOGY

## 2020-06-17 PROCEDURE — 71045 X-RAY EXAM CHEST 1 VIEW: CPT | Mod: TC,FY

## 2020-06-17 PROCEDURE — 80053 COMPREHEN METABOLIC PANEL: CPT

## 2020-06-17 PROCEDURE — 93010 EKG 12-LEAD: ICD-10-PCS | Mod: ,,, | Performed by: INTERNAL MEDICINE

## 2020-06-17 PROCEDURE — 93005 ELECTROCARDIOGRAM TRACING: CPT

## 2020-06-17 PROCEDURE — 85025 COMPLETE CBC W/AUTO DIFF WBC: CPT

## 2020-06-17 NOTE — DISCHARGE INSTRUCTIONS
Your procedure  is scheduled for __6/24/2020________.    Call [phone] between 2pm and 5pm on _6/23/2020____to find out your arrival time for the day of surgery.    Report to the Emergency Department on the day of your surgery.  You will be escorted to the admitting unit.    Important instructions:   Do not eat or drink after 12 midnight, including water.  It is okay to brush your teeth.  Do not have gum, candy or mints.     Take only these medications with a small swallow of water on the morning of your surgery __nifedipine____________      Stop taking Aspirin, Ibuprofen, Motrin and Aleve , Fish oil, and Vitamin E for at least 7 days before your surgery. You may use Tylenol unless otherwise instructed by your doctor.          Return to patient registration through the Emergency Room as previously on__6/22/2020 at 7:30 am_ for  Covid test.       Please shower the night before and the morning of your surgery.        Use Hibiclens soap as instructed by your pre op nurse.   Please place clean linens on your bed the night before surgery. Please wear fresh clean clothing after each shower.     No shaving of procedural area at least 4-5 days before surgery due to increased risk of skin irritation and/or possible infection.     You may wear deodorant only.      Do not wear powder, body lotion or perfume/cologne.     Do not wear any jewelry or have any metal on your body.     You will be asked to remove any dentures or partials for the procedure.     Please bring any documents given to you by your doctor.     If you are going home on the same day of surgery, you must arrange for a family member or a friend to drive you home.  Public transportation is prohibited.  You will not be able to drive home if you were given anesthesia or sedation.     Wear loose fitting clothes allowing for bandages.     Please leave money and valuables home.       You may bring your cell phone.     Call the doctor if fever or illness  should occur before your surgery.    Call [phone] to contact us here if needed.

## 2020-06-18 ENCOUNTER — TELEPHONE (OUTPATIENT)
Dept: FAMILY MEDICINE | Facility: CLINIC | Age: 40
End: 2020-06-18

## 2020-06-19 ENCOUNTER — OFFICE VISIT (OUTPATIENT)
Dept: FAMILY MEDICINE | Facility: CLINIC | Age: 40
End: 2020-06-19
Payer: COMMERCIAL

## 2020-06-19 VITALS
HEIGHT: 67 IN | BODY MASS INDEX: 39.1 KG/M2 | HEART RATE: 106 BPM | WEIGHT: 249.13 LBS | OXYGEN SATURATION: 95 % | RESPIRATION RATE: 16 BRPM | SYSTOLIC BLOOD PRESSURE: 150 MMHG | TEMPERATURE: 100 F | DIASTOLIC BLOOD PRESSURE: 100 MMHG

## 2020-06-19 DIAGNOSIS — I10 ESSENTIAL HYPERTENSION: Primary | Chronic | ICD-10-CM

## 2020-06-19 PROCEDURE — 3077F PR MOST RECENT SYSTOLIC BLOOD PRESSURE >= 140 MM HG: ICD-10-PCS | Mod: CPTII,S$GLB,, | Performed by: PHYSICIAN ASSISTANT

## 2020-06-19 PROCEDURE — 3008F BODY MASS INDEX DOCD: CPT | Mod: CPTII,S$GLB,, | Performed by: PHYSICIAN ASSISTANT

## 2020-06-19 PROCEDURE — 3080F DIAST BP >= 90 MM HG: CPT | Mod: CPTII,S$GLB,, | Performed by: PHYSICIAN ASSISTANT

## 2020-06-19 PROCEDURE — 3080F PR MOST RECENT DIASTOLIC BLOOD PRESSURE >= 90 MM HG: ICD-10-PCS | Mod: CPTII,S$GLB,, | Performed by: PHYSICIAN ASSISTANT

## 2020-06-19 PROCEDURE — 99214 OFFICE O/P EST MOD 30 MIN: CPT | Mod: S$GLB,,, | Performed by: PHYSICIAN ASSISTANT

## 2020-06-19 PROCEDURE — 3008F PR BODY MASS INDEX (BMI) DOCUMENTED: ICD-10-PCS | Mod: CPTII,S$GLB,, | Performed by: PHYSICIAN ASSISTANT

## 2020-06-19 PROCEDURE — 99214 PR OFFICE/OUTPT VISIT, EST, LEVL IV, 30-39 MIN: ICD-10-PCS | Mod: S$GLB,,, | Performed by: PHYSICIAN ASSISTANT

## 2020-06-19 PROCEDURE — 99999 PR PBB SHADOW E&M-EST. PATIENT-LVL III: CPT | Mod: PBBFAC,,, | Performed by: PHYSICIAN ASSISTANT

## 2020-06-19 PROCEDURE — 99999 PR PBB SHADOW E&M-EST. PATIENT-LVL III: ICD-10-PCS | Mod: PBBFAC,,, | Performed by: PHYSICIAN ASSISTANT

## 2020-06-19 PROCEDURE — 3077F SYST BP >= 140 MM HG: CPT | Mod: CPTII,S$GLB,, | Performed by: PHYSICIAN ASSISTANT

## 2020-06-19 RX ORDER — HYDROCHLOROTHIAZIDE 25 MG/1
25 TABLET ORAL DAILY
Qty: 90 TABLET | Refills: 0 | Status: SHIPPED | OUTPATIENT
Start: 2020-06-19 | End: 2020-09-16

## 2020-06-19 NOTE — PROGRESS NOTES
Subjective:       Patient ID: Romain French is a 40 y.o. male.    Chief Complaint: Hypertension (follow up )    Hypertension  This is a recurrent problem. The current episode started more than 1 year ago. The problem has been waxing and waning since onset. The problem is resistant. Associated symptoms include headaches, neck pain, palpitations and sweats. Pertinent negatives include no anxiety, blurred vision, chest pain, malaise/fatigue, orthopnea, peripheral edema, PND or shortness of breath. Agents associated with hypertension include NSAIDs. Risk factors for coronary artery disease include stress. Past treatments include beta blockers. The current treatment provides mild improvement. Compliance problems include diet.      Social History     Socioeconomic History    Marital status:      Spouse name: Not on file    Number of children: Not on file    Years of education: Not on file    Highest education level: Not on file   Occupational History    Not on file   Social Needs    Financial resource strain: Not on file    Food insecurity     Worry: Not on file     Inability: Not on file    Transportation needs     Medical: Not on file     Non-medical: Not on file   Tobacco Use    Smoking status: Never Smoker    Smokeless tobacco: Never Used   Substance and Sexual Activity    Alcohol use: Yes     Alcohol/week: 2.0 standard drinks     Types: 2 Shots of liquor per week     Comment: daily    Drug use: Never    Sexual activity: Not on file   Lifestyle    Physical activity     Days per week: Not on file     Minutes per session: Not on file    Stress: Not on file   Relationships    Social connections     Talks on phone: Not on file     Gets together: Not on file     Attends Confucianism service: Not on file     Active member of club or organization: Not on file     Attends meetings of clubs or organizations: Not on file     Relationship status: Not on file   Other Topics Concern    Not on file   Social  History Narrative    Not on file       Review of Systems   Constitutional: Negative for malaise/fatigue.   Eyes: Negative for blurred vision.   Respiratory: Negative for shortness of breath.    Cardiovascular: Positive for palpitations. Negative for chest pain, orthopnea and PND.   Musculoskeletal: Positive for neck pain.   Neurological: Positive for headaches.         Objective:      Physical Exam  Constitutional:       Appearance: He is normal weight.   Cardiovascular:      Rate and Rhythm: Normal rate and regular rhythm.   Pulmonary:      Effort: Pulmonary effort is normal.      Breath sounds: Normal breath sounds.   Skin:     General: Skin is warm and dry.   Neurological:      General: No focal deficit present.      Mental Status: He is alert and oriented to person, place, and time.   Psychiatric:         Mood and Affect: Mood normal.         Assessment:       1. Essential hypertension        Plan:         Romain was seen today for hypertension.    Diagnoses and all orders for this visit:    Essential hypertension  -     hydroCHLOROthiazide (HYDRODIURIL) 25 MG tablet; Take 1 tablet (25 mg total) by mouth once daily.  -     Add hctz to current regimen, advised low sodium diet, increase exercise

## 2020-06-22 ENCOUNTER — HOSPITAL ENCOUNTER (OUTPATIENT)
Dept: PREADMISSION TESTING | Facility: HOSPITAL | Age: 40
Discharge: HOME | End: 2020-06-22
Attending: NURSE PRACTITIONER
Payer: COMMERCIAL

## 2020-06-22 DIAGNOSIS — Z01.818 PREOP TESTING: ICD-10-CM

## 2020-06-22 LAB — SARS-COV-2 RDRP RESP QL NAA+PROBE: NEGATIVE

## 2020-06-22 PROCEDURE — U0002 COVID-19 LAB TEST NON-CDC: HCPCS

## 2020-06-23 ENCOUNTER — ANESTHESIA EVENT (OUTPATIENT)
Dept: SURGERY | Facility: HOSPITAL | Age: 40
End: 2020-06-23
Payer: COMMERCIAL

## 2020-06-24 ENCOUNTER — HOSPITAL ENCOUNTER (OUTPATIENT)
Facility: HOSPITAL | Age: 40
Discharge: HOME | End: 2020-06-24
Attending: SURGERY | Admitting: SURGERY
Payer: COMMERCIAL

## 2020-06-24 ENCOUNTER — ANESTHESIA (OUTPATIENT)
Dept: SURGERY | Facility: HOSPITAL | Age: 40
End: 2020-06-24
Payer: COMMERCIAL

## 2020-06-24 VITALS
DIASTOLIC BLOOD PRESSURE: 82 MMHG | WEIGHT: 248.88 LBS | TEMPERATURE: 98 F | BODY MASS INDEX: 38.98 KG/M2 | RESPIRATION RATE: 20 BRPM | OXYGEN SATURATION: 97 % | SYSTOLIC BLOOD PRESSURE: 138 MMHG | HEART RATE: 107 BPM

## 2020-06-24 DIAGNOSIS — K82.9 GALLBLADDER PAIN: ICD-10-CM

## 2020-06-24 DIAGNOSIS — Z01.818 PREOP TESTING: Primary | ICD-10-CM

## 2020-06-24 DIAGNOSIS — K80.20 GALLSTONE: ICD-10-CM

## 2020-06-24 PROCEDURE — D9220A PRA ANESTHESIA: Mod: ANES,,, | Performed by: ANESTHESIOLOGY

## 2020-06-24 PROCEDURE — 36000708 HC OR TIME LEV III 1ST 15 MIN: Performed by: SURGERY

## 2020-06-24 PROCEDURE — 63600175 PHARM REV CODE 636 W HCPCS: Performed by: ANESTHESIOLOGY

## 2020-06-24 PROCEDURE — 27201423 OPTIME MED/SURG SUP & DEVICES STERILE SUPPLY: Performed by: SURGERY

## 2020-06-24 PROCEDURE — 71000016 HC POSTOP RECOV ADDL HR: Performed by: SURGERY

## 2020-06-24 PROCEDURE — 71000033 HC RECOVERY, INTIAL HOUR: Performed by: SURGERY

## 2020-06-24 PROCEDURE — D9220A PRA ANESTHESIA: ICD-10-PCS | Mod: ANES,,, | Performed by: ANESTHESIOLOGY

## 2020-06-24 PROCEDURE — D9220A PRA ANESTHESIA: ICD-10-PCS | Mod: CRNA,,, | Performed by: REGISTERED NURSE

## 2020-06-24 PROCEDURE — 63600175 PHARM REV CODE 636 W HCPCS: Performed by: REGISTERED NURSE

## 2020-06-24 PROCEDURE — 47563 LAPARO CHOLECYSTECTOMY/GRAPH: CPT | Mod: ,,, | Performed by: SURGERY

## 2020-06-24 PROCEDURE — 25000003 PHARM REV CODE 250: Performed by: REGISTERED NURSE

## 2020-06-24 PROCEDURE — 71000015 HC POSTOP RECOV 1ST HR: Performed by: SURGERY

## 2020-06-24 PROCEDURE — D9220A PRA ANESTHESIA: Mod: CRNA,,, | Performed by: REGISTERED NURSE

## 2020-06-24 PROCEDURE — 63600175 PHARM REV CODE 636 W HCPCS: Performed by: SURGERY

## 2020-06-24 PROCEDURE — 37000009 HC ANESTHESIA EA ADD 15 MINS: Performed by: SURGERY

## 2020-06-24 PROCEDURE — 47563 PR LAP,CHOLECYSTECTOMY/GRAPH: ICD-10-PCS | Mod: ,,, | Performed by: SURGERY

## 2020-06-24 PROCEDURE — 25000003 PHARM REV CODE 250: Performed by: SURGERY

## 2020-06-24 PROCEDURE — 36000709 HC OR TIME LEV III EA ADD 15 MIN: Performed by: SURGERY

## 2020-06-24 PROCEDURE — S0020 INJECTION, BUPIVICAINE HYDRO: HCPCS | Performed by: SURGERY

## 2020-06-24 PROCEDURE — 88304 TISSUE EXAM BY PATHOLOGIST: CPT | Performed by: PATHOLOGY

## 2020-06-24 PROCEDURE — 88304 PR  SURG PATH,LEVEL III: ICD-10-PCS | Mod: 26,,, | Performed by: PATHOLOGY

## 2020-06-24 PROCEDURE — 88304 TISSUE EXAM BY PATHOLOGIST: CPT | Mod: 26,,, | Performed by: PATHOLOGY

## 2020-06-24 PROCEDURE — 71000039 HC RECOVERY, EACH ADD'L HOUR: Performed by: SURGERY

## 2020-06-24 PROCEDURE — 37000008 HC ANESTHESIA 1ST 15 MINUTES: Performed by: SURGERY

## 2020-06-24 RX ORDER — LIDOCAINE HYDROCHLORIDE 20 MG/ML
INJECTION INTRAVENOUS
Status: DISCONTINUED | OUTPATIENT
Start: 2020-06-24 | End: 2020-06-24

## 2020-06-24 RX ORDER — CEFAZOLIN SODIUM 2 G/50ML
2 SOLUTION INTRAVENOUS
Status: COMPLETED | OUTPATIENT
Start: 2020-06-24 | End: 2020-06-24

## 2020-06-24 RX ORDER — LIDOCAINE HYDROCHLORIDE 10 MG/ML
0.5 INJECTION, SOLUTION EPIDURAL; INFILTRATION; INTRACAUDAL; PERINEURAL ONCE
Status: DISCONTINUED | OUTPATIENT
Start: 2020-06-24 | End: 2020-06-24 | Stop reason: HOSPADM

## 2020-06-24 RX ORDER — LIDOCAINE HYDROCHLORIDE 10 MG/ML
1 INJECTION, SOLUTION EPIDURAL; INFILTRATION; INTRACAUDAL; PERINEURAL ONCE
Status: DISCONTINUED | OUTPATIENT
Start: 2020-06-24 | End: 2020-06-24 | Stop reason: HOSPADM

## 2020-06-24 RX ORDER — SODIUM CHLORIDE, SODIUM LACTATE, POTASSIUM CHLORIDE, CALCIUM CHLORIDE 600; 310; 30; 20 MG/100ML; MG/100ML; MG/100ML; MG/100ML
INJECTION, SOLUTION INTRAVENOUS CONTINUOUS
Status: DISCONTINUED | OUTPATIENT
Start: 2020-06-24 | End: 2020-06-24 | Stop reason: HOSPADM

## 2020-06-24 RX ORDER — SODIUM CHLORIDE 9 MG/ML
INJECTION, SOLUTION INTRAVENOUS CONTINUOUS
Status: DISCONTINUED | OUTPATIENT
Start: 2020-06-24 | End: 2020-06-24 | Stop reason: HOSPADM

## 2020-06-24 RX ORDER — PHENYLEPHRINE HYDROCHLORIDE 10 MG/ML
INJECTION INTRAVENOUS
Status: DISCONTINUED | OUTPATIENT
Start: 2020-06-24 | End: 2020-06-24

## 2020-06-24 RX ORDER — MIDAZOLAM HYDROCHLORIDE 1 MG/ML
INJECTION, SOLUTION INTRAMUSCULAR; INTRAVENOUS
Status: DISCONTINUED | OUTPATIENT
Start: 2020-06-24 | End: 2020-06-24

## 2020-06-24 RX ORDER — SODIUM CHLORIDE 0.9 % (FLUSH) 0.9 %
10 SYRINGE (ML) INJECTION
Status: DISCONTINUED | OUTPATIENT
Start: 2020-06-24 | End: 2020-06-24 | Stop reason: HOSPADM

## 2020-06-24 RX ORDER — FENTANYL CITRATE 50 UG/ML
INJECTION, SOLUTION INTRAMUSCULAR; INTRAVENOUS
Status: DISCONTINUED | OUTPATIENT
Start: 2020-06-24 | End: 2020-06-24

## 2020-06-24 RX ORDER — ONDANSETRON 2 MG/ML
INJECTION INTRAMUSCULAR; INTRAVENOUS
Status: DISCONTINUED | OUTPATIENT
Start: 2020-06-24 | End: 2020-06-24

## 2020-06-24 RX ORDER — GLYCOPYRROLATE 0.2 MG/ML
INJECTION INTRAMUSCULAR; INTRAVENOUS
Status: DISCONTINUED | OUTPATIENT
Start: 2020-06-24 | End: 2020-06-24

## 2020-06-24 RX ORDER — BUPIVACAINE HYDROCHLORIDE 2.5 MG/ML
INJECTION, SOLUTION INFILTRATION; PERINEURAL
Status: DISCONTINUED | OUTPATIENT
Start: 2020-06-24 | End: 2020-06-24 | Stop reason: HOSPADM

## 2020-06-24 RX ORDER — EPHEDRINE SULFATE 50 MG/ML
INJECTION, SOLUTION INTRAVENOUS
Status: DISCONTINUED | OUTPATIENT
Start: 2020-06-24 | End: 2020-06-24

## 2020-06-24 RX ORDER — ROCURONIUM BROMIDE 10 MG/ML
INJECTION, SOLUTION INTRAVENOUS
Status: DISCONTINUED | OUTPATIENT
Start: 2020-06-24 | End: 2020-06-24

## 2020-06-24 RX ORDER — NEOSTIGMINE METHYLSULFATE 1 MG/ML
INJECTION, SOLUTION INTRAVENOUS
Status: DISCONTINUED | OUTPATIENT
Start: 2020-06-24 | End: 2020-06-24

## 2020-06-24 RX ORDER — HYDROMORPHONE HYDROCHLORIDE 2 MG/ML
0.2 INJECTION, SOLUTION INTRAMUSCULAR; INTRAVENOUS; SUBCUTANEOUS EVERY 5 MIN PRN
Status: DISCONTINUED | OUTPATIENT
Start: 2020-06-24 | End: 2020-06-24 | Stop reason: HOSPADM

## 2020-06-24 RX ORDER — HYDROCODONE BITARTRATE AND ACETAMINOPHEN 10; 325 MG/1; MG/1
1 TABLET ORAL EVERY 6 HOURS PRN
Qty: 20 TABLET | Refills: 0 | Status: SHIPPED | OUTPATIENT
Start: 2020-06-24 | End: 2021-05-13

## 2020-06-24 RX ORDER — HYDROCODONE BITARTRATE AND ACETAMINOPHEN 10; 325 MG/1; MG/1
1 TABLET ORAL ONCE
Status: COMPLETED | OUTPATIENT
Start: 2020-06-24 | End: 2020-06-24

## 2020-06-24 RX ORDER — SUCCINYLCHOLINE CHLORIDE 20 MG/ML
INJECTION INTRAMUSCULAR; INTRAVENOUS
Status: DISCONTINUED | OUTPATIENT
Start: 2020-06-24 | End: 2020-06-24

## 2020-06-24 RX ORDER — PROPOFOL 10 MG/ML
VIAL (ML) INTRAVENOUS
Status: DISCONTINUED | OUTPATIENT
Start: 2020-06-24 | End: 2020-06-24

## 2020-06-24 RX ADMIN — ROCURONIUM BROMIDE 10 MG: 10 INJECTION, SOLUTION INTRAVENOUS at 10:06

## 2020-06-24 RX ADMIN — EPHEDRINE SULFATE 10 MG: 50 INJECTION, SOLUTION INTRAMUSCULAR; INTRAVENOUS; SUBCUTANEOUS at 10:06

## 2020-06-24 RX ADMIN — PHENYLEPHRINE HYDROCHLORIDE 100 MCG: 10 INJECTION INTRAVENOUS at 10:06

## 2020-06-24 RX ADMIN — FENTANYL CITRATE 50 MCG: 50 INJECTION INTRAMUSCULAR; INTRAVENOUS at 11:06

## 2020-06-24 RX ADMIN — HYDROMORPHONE HYDROCHLORIDE 0.2 MG: 2 INJECTION, SOLUTION INTRAMUSCULAR; INTRAVENOUS; SUBCUTANEOUS at 12:06

## 2020-06-24 RX ADMIN — PROPOFOL 50 MG: 10 INJECTION, EMULSION INTRAVENOUS at 11:06

## 2020-06-24 RX ADMIN — PROPOFOL 200 MG: 10 INJECTION, EMULSION INTRAVENOUS at 10:06

## 2020-06-24 RX ADMIN — SODIUM CHLORIDE, SODIUM LACTATE, POTASSIUM CHLORIDE, AND CALCIUM CHLORIDE: .6; .31; .03; .02 INJECTION, SOLUTION INTRAVENOUS at 10:06

## 2020-06-24 RX ADMIN — PHENYLEPHRINE HYDROCHLORIDE 200 MCG: 10 INJECTION INTRAVENOUS at 11:06

## 2020-06-24 RX ADMIN — PHENYLEPHRINE HYDROCHLORIDE 100 MCG: 10 INJECTION INTRAVENOUS at 11:06

## 2020-06-24 RX ADMIN — ROCURONIUM BROMIDE 10 MG: 10 INJECTION, SOLUTION INTRAVENOUS at 11:06

## 2020-06-24 RX ADMIN — FENTANYL CITRATE 100 MCG: 50 INJECTION INTRAMUSCULAR; INTRAVENOUS at 10:06

## 2020-06-24 RX ADMIN — MIDAZOLAM HYDROCHLORIDE 2 MG: 1 INJECTION, SOLUTION INTRAMUSCULAR; INTRAVENOUS at 10:06

## 2020-06-24 RX ADMIN — ROCURONIUM BROMIDE 30 MG: 10 INJECTION, SOLUTION INTRAVENOUS at 10:06

## 2020-06-24 RX ADMIN — EPHEDRINE SULFATE 10 MG: 50 INJECTION, SOLUTION INTRAMUSCULAR; INTRAVENOUS; SUBCUTANEOUS at 11:06

## 2020-06-24 RX ADMIN — ONDANSETRON 4 MG: 2 INJECTION, SOLUTION INTRAMUSCULAR; INTRAVENOUS at 10:06

## 2020-06-24 RX ADMIN — SUCCINYLCHOLINE CHLORIDE 140 MG: 20 INJECTION, SOLUTION INTRAMUSCULAR; INTRAVENOUS at 10:06

## 2020-06-24 RX ADMIN — NEOSTIGMINE METHYLSULFATE 4 MG: 1 INJECTION INTRAVENOUS at 11:06

## 2020-06-24 RX ADMIN — CEFAZOLIN SODIUM 2 G: 2 SOLUTION INTRAVENOUS at 10:06

## 2020-06-24 RX ADMIN — HYDROCODONE BITARTRATE AND ACETAMINOPHEN 1 TABLET: 10; 325 TABLET ORAL at 02:06

## 2020-06-24 RX ADMIN — PHENYLEPHRINE HYDROCHLORIDE 200 MCG: 10 INJECTION INTRAVENOUS at 10:06

## 2020-06-24 RX ADMIN — SODIUM CHLORIDE, SODIUM LACTATE, POTASSIUM CHLORIDE, AND CALCIUM CHLORIDE: .6; .31; .03; .02 INJECTION, SOLUTION INTRAVENOUS at 08:06

## 2020-06-24 RX ADMIN — GLYCOPYRROLATE 0.4 MG: 0.2 INJECTION, SOLUTION INTRAMUSCULAR; INTRAVENOUS at 11:06

## 2020-06-24 RX ADMIN — HYDROMORPHONE HYDROCHLORIDE 0.2 MG: 2 INJECTION, SOLUTION INTRAMUSCULAR; INTRAVENOUS; SUBCUTANEOUS at 01:06

## 2020-06-24 RX ADMIN — Medication 100 MG: at 10:06

## 2020-06-24 NOTE — TRANSFER OF CARE
Anesthesia Transfer of Care Note    Patient: Romain French    Procedure(s) Performed: Procedure(s) (LRB):  CHOLECYSTECTOMY, LAPAROSCOPIC (N/A)    Patient location: PACU    Anesthesia Type: general    Transport from OR: Transported from OR on room air with adequate spontaneous ventilation    Post pain: adequate analgesia    Post assessment: tolerated procedure well and no apparent anesthetic complications    Post vital signs: stable    Level of consciousness: sedated    Nausea/Vomiting: no nausea/vomiting    Complications: none    Transfer of care protocol was followed      Last vitals:   Visit Vitals  BP (!) 142/93 (BP Location: Left arm, Patient Position: Sitting)   Pulse (!) 115   Temp 37.1 °C (98.8 °F) (Oral)   Resp 18   Wt 112.9 kg (248 lb 14.4 oz)   SpO2 100%   BMI 38.98 kg/m²

## 2020-06-24 NOTE — ANESTHESIA PREPROCEDURE EVALUATION
06/24/2020  Romain French is a 40 y.o., male.    Anesthesia Evaluation          Review of Systems         Anesthesia Plan  Type of Anesthesia, risks & benefits discussed:  Anesthesia Type:  general  Patient's Preference:   Intra-op Monitoring Plan: standard ASA monitors  Intra-op Monitoring Plan Comments:   Post Op Pain Control Plan: multimodal analgesia, IV/PO Opioids PRN and per primary service following discharge from PACU  Post Op Pain Control Plan Comments:   Induction:    Beta Blocker:  Patient is not currently on a Beta-Blocker (No further documentation required).       Informed Consent: Patient understands risks and agrees with Anesthesia plan.  Questions answered. Anesthesia consent signed with patient.  ASA Score: 2     Day of Surgery Review of History & Physical:    H&P update referred to the provider.  H&P completed by Anesthesiologist.   Anesthesia Plan Notes: npo        Ready For Surgery From Anesthesia Perspective.

## 2020-06-24 NOTE — INTERVAL H&P NOTE
The patient has been examined and the H&P has been reviewed:    I concur with the findings and no changes have occurred since H&P was written.    Anesthesia/Surgery risks, benefits and alternative options discussed and understood by patient/family.          Active Hospital Problems    Diagnosis  POA    Gallbladder pain [K82.9]  Yes      Resolved Hospital Problems   No resolved problems to display.

## 2020-06-24 NOTE — OP NOTE
CPT 14175      [facility]  General Surgery  Operative Note    SUMMARY     Date of Procedure: 6/24/2020     Procedure: Laparoscopic Cholecystectomy using Fluorescence Angiography/Cholangiogram    Attending Surgeon: Martin Kee    Pre-Operative Diagnosis:   Biliary colic     Post-Operative Diagnosis:   same    Anesthesia: General    Indications: Romain French is a 40 y.o..male who presented with a history of a symptomatic gallstones versus gallstone.    Procedure in detail:  The patient was seen again in the Holding Room. Preop he was given ICG IV. The risks, benefits, complications, treatment options, and expected outcomes were discussed with the patient. The possibilities of reaction to medication, pulmonary aspiration, perforation of viscus, bleeding, recurrent infection, the need for additional procedures, and development of a complication requiring transfusion or further operation were discussed with the patient and/or family. There was concurrence with the proposed plan, and informed consent was obtained. The patient was taken to the Operating Room, identified as Romain French, and the procedure verified as cholecystectomy. A Time Out was held and the above information confirmed.    The patient was placed in the supine position and underwent induction of anesthesia, the abdomen was prepped and draped in the standard fashion.   Pre op local anesthesia given, 10 mL total of bupivicaine. A supra-umbilical incision was made covering the small umbilical hernia.  This was taken down to fascia which was incised and entered. A 10 mm balloon trocar was placed and pneumoperitoneum was achieved. 5 mm 30 degree camera placed and 3x 5 mm trocars placed in the subxiphoid, RUQ and right flank locations. We retracted the gallbladder cephalad and laterally retracted the infundibulum.  Blunt and cautery dissection used to identify cystic duct and cystic artery. We used the [employer] angiography mode and visualized the  Cystic duct with no aberrant duct, there was too much fat to see the cbd but we noted only 1 attachment (cystic duct) to the gallbladder.  Cystic duct and cystic artery were each clipped 2x proximally and once distally and transected. Cautery was used to remove the gallbladder from the fossa on the liver bed. no bile spillage noted and no green drainage noted from gallbladder fossa. no suction irrigation used.    We then removed the gallbladder with an endocatch bag. We were noted to be hemostatic on the liver fossa.    All trocars removed. The umbilical incision site closed with 0 ethibond sutures, interrupted, at the fascia and we also included the umbilical hernia with this closure. All skin closed with 4-0 monocryl subcuticular suture. All counts correct x2. Tolerated well.    Description of the Findings of the Procedure:  normal gallbladder    Complications: No    Estimated Blood Loss (EBL): * No values recorded between 6/24/2020 10:32 AM and 6/24/2020 11:46 AM *           Implants: * No implants in log *     Specimens:   Gallbladder    Condition: Good    Disposition: PACU - hemodynamically stable.    Attestation: I was present and scrubbed for the entire procedure.

## 2020-06-24 NOTE — DISCHARGE SUMMARY
[facility]  Discharge Note  Short Stay    Procedure(s) (LRB):  CHOLECYSTECTOMY, LAPAROSCOPIC (N/A)   Intraop IOC using Fluorescence Angiography    OUTCOME: Patient tolerated treatment/procedure well without complication and is now ready for discharge.    DISPOSITION: Home or Self Care    FINAL DIAGNOSIS:  Gallbladder pain    FOLLOWUP: In clinic    DISCHARGE INSTRUCTIONS:    Discharge Procedure Orders   Diet Adult Regular     Lifting restrictions   Order Comments: No heavy lifting 4 weeks     No dressing needed

## 2020-06-24 NOTE — DISCHARGE INSTRUCTIONS
[facility]  Office # [phone]     Discharge Instructions for Same Day Surgery     Call the office for and appointment if one has not already been made.     Diet: Drink plenty of fluids the first 48 hours and you may resume your   usual diet.     Activity: No heavy lifting (over 10 pounds), pushing or pulling until your   post op visit. Your doctor's office may have told you to limit your lifting to less weight, or even no weight.  Be sure to follow those instructions.    Note: You may ride in a car and you may drive when comfortable.     Do not drive, drink alcohol, or sign legal documents for 24 hours, or if taking narcotic pain medication.    Dermabond / Prineotape    or a material like it was used on your incision.   It is like a liquid glue.   Do not peel or try to remove it it will start to fall off in 7-10 days on its' own.   It is OK to shower, pat dry, do not apply any creams or lotions.    No tub baths, swimming pools, hot tubs or submersion of the incision until your surgeon says it's ok.       Medical: Call the doctor for any of the following problems: fever above 100.4,   severe pain, bleeding, or abdominal distention (swelling).   If constipated you may take any stool softener you choose.     Occasionally small areas of skin numbness or an unpleasant skin sensation can result. Also, you may find that your incision is swollen and tender for a few days.  Some redness around sutures and staples is a normal reaction, but if the discomfort persists or worsens, call you doctor.         Discharge Instructions for Laparoscopic Cholecystectomy  You have had a procedure known as a laparoscopic cholecystectomy. A laparoscopic cholecystectomy is a procedure to remove your gallbladder. People who have this procedure usually recover more quickly and have less pain than with open gallbladder surgery (called open cholecystectomy). Many surgeons recommend a low-fat diet, avoiding fried food in  particular, for the first month after surgery.   You can live a full and healthy life without your gallbladder. This includes eating the foods and doing the things you enjoyed before your gallbladder problems started.  Home care  Recommendations for home care include the following:   · Ask someone to drive you to your appointments for the next 3 days. Dont drive until you are no longer taking pain medicine and are able to step on the brake pedal without hesitation.   · Wash the skin around your incision daily with mild soap and water. It's OK to shower the day after your surgery.  · Eat your regular diet. It is wise to stay away from rich, greasy, or spicy food for a few days.  · Remember, it takes at least 1 week for you to get most of your strength and energy back.  · Make an office visit to talk to your healthcare provider if the following symptoms dont go away within a week after your surgery:  ¨ Fatigue  ¨ Pain around the incision  ¨ Diarrhea or constipation  ¨ Loss of appetite     When to call your healthcare provider  Call your healthcare provider immediately if you have any of the following:  · Yellowing of your eyes or skin (jaundice)  · Chills  · Fever of 100.4°F (38.0°C) or higher, or as directed by your healthcare provider   · Redness, swelling, increasing pain, pus, or a foul smell at the incision site  · Dark or rust-colored urine  · Stool that is monika-colored or light in color instead of brown  · Increasing belly pain  · Rectal bleeding  · Leg swelling or shortness of breath   Date Last Reviewed: 7/1/2016  © 6508-9791 [employer]. [address] [place] [address], [place] [address]. All rights reserved. This information is not intended as a substitute for professional medical care. Always follow your healthcare professional's instructions.    Fall Prevention  Millions of people fall every year and injure themselves. You may have had anesthesia or sedation which may increase your risk of falling.  You may have health issues that put you at an increased risk of falling.     Here are ways to reduce your risk of falling.  ·   · Make your home safe by keeping walkways clear of objects you may trip over.  · Use non-slip pads under rugs. Do not use area rugs or small throw rugs.  · Use non-slip mats in bathtubs and showers.  · Install handrails and lights on staircases.  · Do not walk in poorly lit areas.  · Do not stand on chairs or wobbly ladders.  · Use caution when reaching overhead or looking upward. This position can cause a loss of balance.  · Be sure your shoes fit properly, have non-slip bottoms and are in good condition.   · Wear shoes both inside and out. Avoid going barefoot or wearing slippers.  · Be cautious when going up and down stairs, curbs, and when walking on uneven sidewalks.  · If your balance is poor, consider using a cane or walker.  · If your fall was related to alcohol use, stop or limit alcohol intake.   · If your fall was related to use of sleeping medicines, talk to your doctor about this. You may need to reduce your dosage at bedtime if you awaken during the night to go to the bathroom.    · To reduce the need for nighttime bathroom trips:  ¨ Avoid drinking fluids for several hours before going to bed  ¨ Empty your bladder before going to bed  ¨ Men can keep a urinal at the bedside  · Stay as active as you can. Balance, flexibility, strength, and endurance all come from exercise. They all play a role in preventing falls. Ask your healthcare provider which types of activity are right for you.  · Get your vision checked on a regular basis.  · If you have pets, know where they are before you stand up or walk so you don't trip over them.  · Use night lights.

## 2020-06-25 NOTE — ANESTHESIA POSTPROCEDURE EVALUATION
Anesthesia Post Evaluation    Patient: Romain French    Procedure(s) Performed: Procedure(s) (LRB):  CHOLECYSTECTOMY, LAPAROSCOPIC (N/A)    Final Anesthesia Type: general    Patient location during evaluation: PACU  Patient participation: Yes- Able to Participate  Level of consciousness: awake and alert, oriented and awake  Post-procedure vital signs: reviewed and stable  Pain management: adequate  Airway patency: patent    PONV status at discharge: No PONV  Anesthetic complications: no      Cardiovascular status: blood pressure returned to baseline, hemodynamically stable and stable  Respiratory status: unassisted and spontaneous ventilation  Hydration status: euvolemic  Follow-up not needed.          Vitals Value Taken Time   /82 06/24/20 1520   Temp 36.5 °C (97.7 °F) 06/24/20 1520   Pulse 107 06/24/20 1520   Resp 20 06/24/20 1520   SpO2 97 % 06/24/20 1520         Event Time   Out of Recovery 13:41:18         Pain/Lindsay Score: Pain Rating Prior to Med Admin: 6 (6/24/2020  2:21 PM)  Lindsay Score: 10 (6/24/2020  1:43 PM)  Modified Lindsay Score: 20 (6/24/2020  3:20 PM)

## 2020-06-25 NOTE — OR NURSING
Late Entry:  Patient received Dilaudid 1.2 mg, Diludid 0.8 mg should have been wasted in [facility].  Waste was documented on the pharmacy controlled drug record and given to Sonal

## 2020-06-29 LAB
FINAL PATHOLOGIC DIAGNOSIS: NORMAL
GROSS: NORMAL
MICROSCOPIC EXAM: NORMAL

## 2020-07-07 ENCOUNTER — OFFICE VISIT (OUTPATIENT)
Dept: SURGERY | Facility: CLINIC | Age: 40
End: 2020-07-07
Payer: COMMERCIAL

## 2020-07-07 VITALS
SYSTOLIC BLOOD PRESSURE: 153 MMHG | WEIGHT: 249 LBS | HEIGHT: 67 IN | DIASTOLIC BLOOD PRESSURE: 98 MMHG | HEART RATE: 103 BPM | BODY MASS INDEX: 39.08 KG/M2

## 2020-07-07 DIAGNOSIS — K81.1 CHRONIC CHOLECYSTITIS: Primary | ICD-10-CM

## 2020-07-07 PROCEDURE — 99999 PR PBB SHADOW E&M-EST. PATIENT-LVL III: CPT | Mod: PBBFAC,,, | Performed by: SURGERY

## 2020-07-07 PROCEDURE — 99999 PR PBB SHADOW E&M-EST. PATIENT-LVL III: ICD-10-PCS | Mod: PBBFAC,,, | Performed by: SURGERY

## 2020-07-07 PROCEDURE — 99024 PR POST-OP FOLLOW-UP VISIT: ICD-10-PCS | Mod: S$GLB,,, | Performed by: SURGERY

## 2020-07-07 PROCEDURE — 99024 POSTOP FOLLOW-UP VISIT: CPT | Mod: S$GLB,,, | Performed by: SURGERY

## 2020-07-07 NOTE — PROGRESS NOTES
Surgery Clinic Note    Romain French is a 40 y.o. year old male in clinic today for follow up of lap desi for painful gallstone vs polyp. Benign path discussed. He feels better. Eating well.  No f/c/n/v/sob/cp    ROS:  Negative except above    Pathology:  Gallbladder:  Chronic cholecystitis  Cholesterolosis     PE:  Vitals:    07/07/20 0838   BP: (!) 153/98   Pulse: 103     NAD  Ambulates well  No belabored breathing  No skin changes  Abd soft nt nd  Incisions c/d/I    A/P:  Romain French is a 40 y.o. year old male s/p lap desi    -no heavy lifting for 4-6 weeks  -rtc prn    Martin Kee  General Surgery - [facility]  7/7/2020  9:19 AM

## 2020-08-14 DIAGNOSIS — Z11.59 NEED FOR HEPATITIS C SCREENING TEST: ICD-10-CM

## 2020-09-16 DIAGNOSIS — I10 ESSENTIAL HYPERTENSION: Chronic | ICD-10-CM

## 2020-09-16 RX ORDER — HYDROCHLOROTHIAZIDE 25 MG/1
TABLET ORAL
Qty: 90 TABLET | Refills: 0 | Status: SHIPPED | OUTPATIENT
Start: 2020-09-16 | End: 2020-10-24 | Stop reason: SDUPTHER

## 2020-09-29 DIAGNOSIS — I10 ESSENTIAL HYPERTENSION: Chronic | ICD-10-CM

## 2020-09-29 RX ORDER — LISINOPRIL 40 MG/1
TABLET ORAL
Qty: 30 TABLET | Refills: 0 | Status: SHIPPED | OUTPATIENT
Start: 2020-09-29 | End: 2020-10-24 | Stop reason: SDUPTHER

## 2020-09-29 NOTE — TELEPHONE ENCOUNTER
Please have him make an appt and we will give 30 days has not been seen in year    Pricilla Ferrara MD

## 2020-10-24 DIAGNOSIS — I10 ESSENTIAL HYPERTENSION: Chronic | ICD-10-CM

## 2020-10-26 RX ORDER — NIFEDIPINE 90 MG/1
90 TABLET, EXTENDED RELEASE ORAL DAILY
Qty: 90 TABLET | Refills: 0 | Status: SHIPPED | OUTPATIENT
Start: 2020-10-26 | End: 2020-10-28 | Stop reason: SDUPTHER

## 2020-10-28 ENCOUNTER — OFFICE VISIT (OUTPATIENT)
Dept: FAMILY MEDICINE | Facility: CLINIC | Age: 40
End: 2020-10-28
Payer: COMMERCIAL

## 2020-10-28 VITALS
BODY MASS INDEX: 39.76 KG/M2 | OXYGEN SATURATION: 95 % | SYSTOLIC BLOOD PRESSURE: 152 MMHG | WEIGHT: 253.31 LBS | TEMPERATURE: 98 F | HEART RATE: 81 BPM | HEIGHT: 67 IN | DIASTOLIC BLOOD PRESSURE: 100 MMHG

## 2020-10-28 DIAGNOSIS — E78.5 HYPERLIPIDEMIA, UNSPECIFIED HYPERLIPIDEMIA TYPE: Chronic | ICD-10-CM

## 2020-10-28 DIAGNOSIS — Z23 FLU VACCINE NEED: ICD-10-CM

## 2020-10-28 DIAGNOSIS — F32.A DEPRESSION, UNSPECIFIED DEPRESSION TYPE: ICD-10-CM

## 2020-10-28 DIAGNOSIS — I10 ESSENTIAL HYPERTENSION: Chronic | ICD-10-CM

## 2020-10-28 DIAGNOSIS — Z00.00 ROUTINE GENERAL MEDICAL EXAMINATION AT A HEALTH CARE FACILITY: Primary | ICD-10-CM

## 2020-10-28 PROCEDURE — 3008F BODY MASS INDEX DOCD: CPT | Mod: CPTII,S$GLB,, | Performed by: FAMILY MEDICINE

## 2020-10-28 PROCEDURE — 3077F PR MOST RECENT SYSTOLIC BLOOD PRESSURE >= 140 MM HG: ICD-10-PCS | Mod: CPTII,S$GLB,, | Performed by: FAMILY MEDICINE

## 2020-10-28 PROCEDURE — 3008F PR BODY MASS INDEX (BMI) DOCUMENTED: ICD-10-PCS | Mod: CPTII,S$GLB,, | Performed by: FAMILY MEDICINE

## 2020-10-28 PROCEDURE — 3080F PR MOST RECENT DIASTOLIC BLOOD PRESSURE >= 90 MM HG: ICD-10-PCS | Mod: CPTII,S$GLB,, | Performed by: FAMILY MEDICINE

## 2020-10-28 PROCEDURE — 99396 PREV VISIT EST AGE 40-64: CPT | Mod: 25,S$GLB,, | Performed by: FAMILY MEDICINE

## 2020-10-28 PROCEDURE — 99396 PR PREVENTIVE VISIT,EST,40-64: ICD-10-PCS | Mod: 25,S$GLB,, | Performed by: FAMILY MEDICINE

## 2020-10-28 PROCEDURE — 90471 IMMUNIZATION ADMIN: CPT | Mod: S$GLB,,, | Performed by: FAMILY MEDICINE

## 2020-10-28 PROCEDURE — 3077F SYST BP >= 140 MM HG: CPT | Mod: CPTII,S$GLB,, | Performed by: FAMILY MEDICINE

## 2020-10-28 PROCEDURE — 3080F DIAST BP >= 90 MM HG: CPT | Mod: CPTII,S$GLB,, | Performed by: FAMILY MEDICINE

## 2020-10-28 PROCEDURE — 90686 FLU VACCINE (QUAD) GREATER THAN OR EQUAL TO 3YO PRESERVATIVE FREE IM: ICD-10-PCS | Mod: S$GLB,,, | Performed by: FAMILY MEDICINE

## 2020-10-28 PROCEDURE — 90471 FLU VACCINE (QUAD) GREATER THAN OR EQUAL TO 3YO PRESERVATIVE FREE IM: ICD-10-PCS | Mod: S$GLB,,, | Performed by: FAMILY MEDICINE

## 2020-10-28 PROCEDURE — 99999 PR PBB SHADOW E&M-EST. PATIENT-LVL IV: CPT | Mod: PBBFAC,,, | Performed by: FAMILY MEDICINE

## 2020-10-28 PROCEDURE — 90686 IIV4 VACC NO PRSV 0.5 ML IM: CPT | Mod: S$GLB,,, | Performed by: FAMILY MEDICINE

## 2020-10-28 PROCEDURE — 99999 PR PBB SHADOW E&M-EST. PATIENT-LVL IV: ICD-10-PCS | Mod: PBBFAC,,, | Performed by: FAMILY MEDICINE

## 2020-10-28 RX ORDER — NIFEDIPINE 90 MG/1
90 TABLET, EXTENDED RELEASE ORAL DAILY
Qty: 90 TABLET | Refills: 1 | Status: SHIPPED | OUTPATIENT
Start: 2020-10-28 | End: 2020-10-30 | Stop reason: SDUPTHER

## 2020-10-28 RX ORDER — LISINOPRIL 40 MG/1
40 TABLET ORAL DAILY
Qty: 90 TABLET | Refills: 1 | Status: SHIPPED | OUTPATIENT
Start: 2020-10-28 | End: 2020-10-30 | Stop reason: SDUPTHER

## 2020-10-28 RX ORDER — BUPROPION HYDROCHLORIDE 150 MG/1
150 TABLET ORAL DAILY
Qty: 90 TABLET | Refills: 1 | Status: SHIPPED | OUTPATIENT
Start: 2020-10-28 | End: 2020-10-30 | Stop reason: SDUPTHER

## 2020-10-28 RX ORDER — CHLORTHALIDONE 50 MG/1
50 TABLET ORAL DAILY
Qty: 90 TABLET | Refills: 1 | Status: SHIPPED | OUTPATIENT
Start: 2020-10-28 | End: 2020-10-30 | Stop reason: SDUPTHER

## 2020-10-28 NOTE — PROGRESS NOTES
Chief Complaint   Patient presents with    Annual Exam       HPI  Romain French is a 40 y.o. male with multiple medical diagnoses as listed in the medical history and problem list that presents for annual exam .    He has been having elevated blood pressure and many outside stressors. He has not been able to focus on his health. He has been having depression. He is undergoing counseling and at times has been severely depressed as he has had custody issues with his daughter. He has not been able to exercise and eat well    He has also been having anxiety attacks approx 3 times weekly  HPI    Patient Active Problem List   Diagnosis    Hyperlipidemia    Vitamin D deficiency    Severe obesity (BMI 35.0-39.9) with comorbidity    Essential hypertension    Patellar tendinitis of both knees    Chronic pain of left ankle    Decreased strength    Muscle stiffness    Gallbladder pain         ROS  Review of Systems   Constitutional: Negative for chills, fatigue, fever and unexpected weight change.   HENT: Negative for ear pain, postnasal drip, rhinorrhea, sinus pressure and sore throat.    Eyes: Negative for photophobia and visual disturbance.   Respiratory: Negative for apnea, cough, chest tightness, shortness of breath and wheezing.    Cardiovascular: Negative for chest pain and palpitations.   Gastrointestinal: Negative for abdominal pain, blood in stool, constipation, diarrhea, nausea and vomiting.   Genitourinary: Negative for difficulty urinating.   Musculoskeletal: Negative for arthralgias and joint swelling.   Skin: Negative for rash.   Neurological: Negative for facial asymmetry, speech difficulty, weakness, numbness and headaches.   Psychiatric/Behavioral: Positive for dysphoric mood.       Physical Exam  Vitals:    10/28/20 0826   BP: (!) 144/104   BP Location: Left arm   Patient Position: Sitting   BP Method: Large (Manual)   Pulse: 81   Temp: 98.4 °F (36.9 °C)   TempSrc: Oral   SpO2: 95%   Weight: 114.9 kg  "(253 lb 4.9 oz)   Height: 5' 7" (1.702 m)    Body mass index is 39.67 kg/m².  Weight: 114.9 kg (253 lb 4.9 oz)   Height: 5' 7" (170.2 cm)     Physical Exam  Vitals signs and nursing note reviewed.   Constitutional:       Appearance: He is well-developed.   HENT:      Head: Normocephalic and atraumatic.      Mouth/Throat:      Pharynx: No oropharyngeal exudate.   Cardiovascular:      Rate and Rhythm: Normal rate and regular rhythm.      Heart sounds: Normal heart sounds. No murmur. No friction rub. No gallop.    Pulmonary:      Effort: Pulmonary effort is normal. No respiratory distress.      Breath sounds: Normal breath sounds. No wheezing or rales.   Chest:      Chest wall: No tenderness.   Lymphadenopathy:      Cervical: No cervical adenopathy.   Skin:     General: Skin is warm and dry.      Findings: No rash.   Neurological:      Mental Status: He is alert and oriented to person, place, and time.   Psychiatric:         Behavior: Behavior normal.         ALLERGIES AND MEDICATIONS: updated and reviewed.  Review of patient's allergies indicates:  No Known Allergies  Medication List with Changes/Refills   New Medications    BUPROPION (WELLBUTRIN XL) 150 MG TB24 TABLET    Take 1 tablet (150 mg total) by mouth once daily.    CHLORTHALIDONE (HYGROTEN) 50 MG TAB    Take 1 tablet (50 mg total) by mouth once daily.   Current Medications    HYDROCODONE-ACETAMINOPHEN (NORCO)  MG PER TABLET    Take 1 tablet by mouth every 6 (six) hours as needed for Pain.    PRAVASTATIN (PRAVACHOL) 40 MG TABLET    Take 1 tablet (40 mg total) by mouth once daily.   Changed and/or Refilled Medications    Modified Medication Previous Medication    LISINOPRIL (PRINIVIL,ZESTRIL) 40 MG TABLET lisinopriL (PRINIVIL,ZESTRIL) 40 MG tablet       Take 1 tablet (40 mg total) by mouth once daily.    Take 1 tablet (40 mg total) by mouth once daily.    NIFEDIPINE (PROCARDIA-XL) 90 MG (OSM) 24 HR TABLET NIFEdipine (PROCARDIA-XL) 90 MG (OSM) 24 hr tablet "       Take 1 tablet (90 mg total) by mouth once daily.    Take 1 tablet (90 mg total) by mouth once daily.   Discontinued Medications    HYDROCHLOROTHIAZIDE (HYDRODIURIL) 25 MG TABLET    Take 1 tablet (25 mg total) by mouth once daily.       Assessment & Plan  1. Routine general medical examination at a health care facility    2. Flu vaccine need    3. Essential hypertension    4. Hyperlipidemia, unspecified hyperlipidemia type    5. Depression, unspecified depression type        Problem List Items Addressed This Visit        Cardiac/Vascular    Essential hypertension (Chronic)  -increase chlorthalidone to 50mg and f/u in one week for blood pressure check    Relevant Medications    lisinopriL (PRINIVIL,ZESTRIL) 40 MG tablet    NIFEdipine (PROCARDIA-XL) 90 MG (OSM) 24 hr tablet    chlorthalidone (HYGROTEN) 50 MG Tab    Hyperlipidemia (Chronic)      Other Visit Diagnoses     Routine general medical examination at a health care facility    -  Primary  --discussed healthy lifestyle modification with exercise and healthy diet, reviewed age appropriate screening and healthy maintenance      Relevant Orders    CBC auto differential    Comprehensive Metabolic Panel    Lipid Panel    Hemoglobin A1C    TSH    HIV 1/2 Ag/Ab (4th Gen)    Hepatitis C Antibody    Flu vaccine need      -as ordered       Relevant Orders    Influenza - Quadrivalent *Preferred* (6 months+) (PF)    Depression, unspecified depression type      -start wellbutrin  He has been referred to counseling we discussed anxiety management strategies    Relevant Medications    buPROPion (WELLBUTRIN XL) 150 MG TB24 tablet          Follow up in about 6 weeks (around 12/9/2020) for Follow up.    Other Orders Placed This Visit:  Orders Placed This Encounter   Procedures    Influenza - Quadrivalent *Preferred* (6 months+) (PF)    CBC auto differential    Comprehensive Metabolic Panel    Lipid Panel    Hemoglobin A1C    TSH    HIV 1/2 Ag/Ab (4th Gen)     Hepatitis C Antibody

## 2020-10-28 NOTE — PATIENT INSTRUCTIONS
Era Callahan, LPC  [address]  Phone [phone]    May try Insight Timer meditation Quintin

## 2020-10-30 ENCOUNTER — PATIENT MESSAGE (OUTPATIENT)
Dept: FAMILY MEDICINE | Facility: CLINIC | Age: 40
End: 2020-10-30

## 2020-10-30 DIAGNOSIS — F32.A DEPRESSION, UNSPECIFIED DEPRESSION TYPE: ICD-10-CM

## 2020-10-30 DIAGNOSIS — E78.5 HYPERLIPIDEMIA, UNSPECIFIED HYPERLIPIDEMIA TYPE: Chronic | ICD-10-CM

## 2020-10-30 DIAGNOSIS — I10 ESSENTIAL HYPERTENSION: Chronic | ICD-10-CM

## 2020-10-30 RX ORDER — LISINOPRIL 40 MG/1
40 TABLET ORAL DAILY
Qty: 90 TABLET | Refills: 1 | Status: SHIPPED | OUTPATIENT
Start: 2020-10-30 | End: 2021-12-06 | Stop reason: SDUPTHER

## 2020-10-30 RX ORDER — PRAVASTATIN SODIUM 40 MG/1
40 TABLET ORAL DAILY
Qty: 90 TABLET | Refills: 0 | Status: SHIPPED | OUTPATIENT
Start: 2020-10-30 | End: 2020-11-23 | Stop reason: ALTCHOICE

## 2020-10-30 RX ORDER — CHLORTHALIDONE 50 MG/1
50 TABLET ORAL DAILY
Qty: 90 TABLET | Refills: 1 | Status: SHIPPED | OUTPATIENT
Start: 2020-10-30 | End: 2022-03-14

## 2020-10-30 RX ORDER — NIFEDIPINE 90 MG/1
90 TABLET, EXTENDED RELEASE ORAL DAILY
Qty: 90 TABLET | Refills: 1 | Status: SHIPPED | OUTPATIENT
Start: 2020-10-30 | End: 2021-07-29 | Stop reason: SDUPTHER

## 2020-10-30 RX ORDER — BUPROPION HYDROCHLORIDE 150 MG/1
150 TABLET ORAL DAILY
Qty: 90 TABLET | Refills: 1 | Status: SHIPPED | OUTPATIENT
Start: 2020-10-30 | End: 2021-12-06

## 2020-10-30 NOTE — TELEPHONE ENCOUNTER
[facility] and need medications sent to another [facility]. Pharmacy pend please advise

## 2020-11-05 ENCOUNTER — LAB VISIT (OUTPATIENT)
Dept: LAB | Facility: HOSPITAL | Age: 40
End: 2020-11-05
Attending: FAMILY MEDICINE
Payer: COMMERCIAL

## 2020-11-05 DIAGNOSIS — Z00.00 ROUTINE GENERAL MEDICAL EXAMINATION AT A HEALTH CARE FACILITY: ICD-10-CM

## 2020-11-05 LAB
ALBUMIN SERPL BCP-MCNC: 4.5 G/DL (ref 3.5–5.2)
ALP SERPL-CCNC: 100 U/L (ref 55–135)
ALT SERPL W/O P-5'-P-CCNC: 39 U/L (ref 10–44)
ANION GAP SERPL CALC-SCNC: 11 MMOL/L (ref 8–16)
AST SERPL-CCNC: 25 U/L (ref 10–40)
BASOPHILS # BLD AUTO: 0.08 K/UL (ref 0–0.2)
BASOPHILS NFR BLD: 1 % (ref 0–1.9)
BILIRUB SERPL-MCNC: 0.6 MG/DL (ref 0.1–1)
BUN SERPL-MCNC: 23 MG/DL (ref 6–20)
CALCIUM SERPL-MCNC: 9.9 MG/DL (ref 8.7–10.5)
CHLORIDE SERPL-SCNC: 99 MMOL/L (ref 95–110)
CHOLEST SERPL-MCNC: 206 MG/DL (ref 120–199)
CHOLEST/HDLC SERPL: 6.2 {RATIO} (ref 2–5)
CO2 SERPL-SCNC: 31 MMOL/L (ref 23–29)
CREAT SERPL-MCNC: 1.6 MG/DL (ref 0.5–1.4)
DIFFERENTIAL METHOD: ABNORMAL
EOSINOPHIL # BLD AUTO: 0.2 K/UL (ref 0–0.5)
EOSINOPHIL NFR BLD: 3 % (ref 0–8)
ERYTHROCYTE [DISTWIDTH] IN BLOOD BY AUTOMATED COUNT: 13 % (ref 11.5–14.5)
EST. GFR  (AFRICAN AMERICAN): >60 ML/MIN/1.73 M^2
EST. GFR  (NON AFRICAN AMERICAN): 52.4 ML/MIN/1.73 M^2
ESTIMATED AVG GLUCOSE: 88 MG/DL (ref 68–131)
GLUCOSE SERPL-MCNC: 94 MG/DL (ref 70–110)
HBA1C MFR BLD HPLC: 4.7 % (ref 4–5.6)
HCT VFR BLD AUTO: 51.4 % (ref 40–54)
HCV AB SERPL QL IA: NEGATIVE
HDLC SERPL-MCNC: 33 MG/DL (ref 40–75)
HDLC SERPL: 16 % (ref 20–50)
HGB BLD-MCNC: 17.3 G/DL (ref 14–18)
HIV 1+2 AB+HIV1 P24 AG SERPL QL IA: NEGATIVE
IMM GRANULOCYTES # BLD AUTO: 0.03 K/UL (ref 0–0.04)
IMM GRANULOCYTES NFR BLD AUTO: 0.4 % (ref 0–0.5)
LDLC SERPL CALC-MCNC: 123.6 MG/DL (ref 63–159)
LYMPHOCYTES # BLD AUTO: 3.1 K/UL (ref 1–4.8)
LYMPHOCYTES NFR BLD: 38.7 % (ref 18–48)
MCH RBC QN AUTO: 31.3 PG (ref 27–31)
MCHC RBC AUTO-ENTMCNC: 33.7 G/DL (ref 32–36)
MCV RBC AUTO: 93 FL (ref 82–98)
MONOCYTES # BLD AUTO: 0.9 K/UL (ref 0.3–1)
MONOCYTES NFR BLD: 10.8 % (ref 4–15)
NEUTROPHILS # BLD AUTO: 3.7 K/UL (ref 1.8–7.7)
NEUTROPHILS NFR BLD: 46.1 % (ref 38–73)
NONHDLC SERPL-MCNC: 173 MG/DL
NRBC BLD-RTO: 0 /100 WBC
PLATELET # BLD AUTO: 294 K/UL (ref 150–350)
PMV BLD AUTO: 10.4 FL (ref 9.2–12.9)
POTASSIUM SERPL-SCNC: 3.8 MMOL/L (ref 3.5–5.1)
PROT SERPL-MCNC: 8.4 G/DL (ref 6–8.4)
RBC # BLD AUTO: 5.53 M/UL (ref 4.6–6.2)
SODIUM SERPL-SCNC: 141 MMOL/L (ref 136–145)
TRIGL SERPL-MCNC: 247 MG/DL (ref 30–150)
TSH SERPL DL<=0.005 MIU/L-ACNC: 2.1 UIU/ML (ref 0.4–4)
WBC # BLD AUTO: 7.93 K/UL (ref 3.9–12.7)

## 2020-11-05 PROCEDURE — 86803 HEPATITIS C AB TEST: CPT

## 2020-11-05 PROCEDURE — 80053 COMPREHEN METABOLIC PANEL: CPT

## 2020-11-05 PROCEDURE — 83036 HEMOGLOBIN GLYCOSYLATED A1C: CPT

## 2020-11-05 PROCEDURE — 36415 COLL VENOUS BLD VENIPUNCTURE: CPT | Mod: PO

## 2020-11-05 PROCEDURE — 80061 LIPID PANEL: CPT

## 2020-11-05 PROCEDURE — 86703 HIV-1/HIV-2 1 RESULT ANTBDY: CPT

## 2020-11-05 PROCEDURE — 85025 COMPLETE CBC W/AUTO DIFF WBC: CPT

## 2020-11-05 PROCEDURE — 84443 ASSAY THYROID STIM HORMONE: CPT

## 2020-11-10 NOTE — PROGRESS NOTES
Your blood is negative for diabetes or prediabetes and your thyroid level is normal. But your kidney function is slightly decreased. This could be from not drinking enough water or from your blood pressure being elevated. Your cholesterol is also high again, which means its a good time to switch to a low fat diet like the Mediterranean diet and  exercising    Also let me know how its going with the medication    Pricilla Ferrara MD

## 2020-11-11 ENCOUNTER — CLINICAL SUPPORT (OUTPATIENT)
Dept: FAMILY MEDICINE | Facility: CLINIC | Age: 40
End: 2020-11-11
Payer: COMMERCIAL

## 2020-11-11 VITALS — OXYGEN SATURATION: 96 % | DIASTOLIC BLOOD PRESSURE: 84 MMHG | HEART RATE: 88 BPM | SYSTOLIC BLOOD PRESSURE: 134 MMHG

## 2020-11-11 DIAGNOSIS — I10 ESSENTIAL HYPERTENSION: Primary | ICD-10-CM

## 2020-11-11 PROCEDURE — 99999 PR PBB SHADOW E&M-EST. PATIENT-LVL II: CPT | Mod: PBBFAC,,,

## 2020-11-11 PROCEDURE — 99999 PR PBB SHADOW E&M-EST. PATIENT-LVL II: ICD-10-PCS | Mod: PBBFAC,,,

## 2020-11-11 PROCEDURE — 99499 UNLISTED E&M SERVICE: CPT | Mod: S$GLB,,, | Performed by: FAMILY MEDICINE

## 2020-11-11 PROCEDURE — 99499 NO LOS: ICD-10-PCS | Mod: S$GLB,,, | Performed by: FAMILY MEDICINE

## 2020-11-11 NOTE — PROGRESS NOTES
Romain French 40 y.o. male is here today for Blood Pressure check.   History of HTN yes.    Review of patient's allergies indicates:  No Known Allergies  Creatinine   Date Value Ref Range Status   11/05/2020 1.6 (H) 0.5 - 1.4 mg/dL Final     Sodium   Date Value Ref Range Status   11/05/2020 141 136 - 145 mmol/L Final     Potassium   Date Value Ref Range Status   11/05/2020 3.8 3.5 - 5.1 mmol/L Final   ]  Patient verifies taking blood pressure medications on a regular basis at the same time of the day.     Current Outpatient Medications:     buPROPion (WELLBUTRIN XL) 150 MG TB24 tablet, Take 1 tablet (150 mg total) by mouth once daily., Disp: 90 tablet, Rfl: 1    chlorthalidone (HYGROTEN) 50 MG Tab, Take 1 tablet (50 mg total) by mouth once daily., Disp: 90 tablet, Rfl: 1    HYDROcodone-acetaminophen (NORCO)  mg per tablet, Take 1 tablet by mouth every 6 (six) hours as needed for Pain. (Patient not taking: Reported on 7/7/2020), Disp: 20 tablet, Rfl: 0    lisinopriL (PRINIVIL,ZESTRIL) 40 MG tablet, Take 1 tablet (40 mg total) by mouth once daily., Disp: 90 tablet, Rfl: 1    NIFEdipine (PROCARDIA-XL) 90 MG (OSM) 24 hr tablet, Take 1 tablet (90 mg total) by mouth once daily., Disp: 90 tablet, Rfl: 1    pravastatin (PRAVACHOL) 40 MG tablet, Take 1 tablet (40 mg total) by mouth once daily., Disp: 90 tablet, Rfl: 0  Does patient have record of home blood pressure readings no. .   Last dose of blood pressure medication was taken at 7:00 this am.  Patient is asymptomatic.   Complains of none.    Vitals:    11/11/20 0754   BP: 134/84   BP Location: Left arm   Patient Position: Sitting   BP Method: Large (Manual)   Pulse: 88   SpO2: 96%         Dr. Ferrara informed of nurse visit.

## 2020-11-21 ENCOUNTER — PATIENT MESSAGE (OUTPATIENT)
Dept: FAMILY MEDICINE | Facility: CLINIC | Age: 40
End: 2020-11-21

## 2020-11-21 DIAGNOSIS — E78.5 HYPERLIPIDEMIA, UNSPECIFIED HYPERLIPIDEMIA TYPE: Primary | ICD-10-CM

## 2020-11-23 RX ORDER — ATORVASTATIN CALCIUM 40 MG/1
40 TABLET, FILM COATED ORAL DAILY
Qty: 90 TABLET | Refills: 0 | Status: SHIPPED | OUTPATIENT
Start: 2020-11-23 | End: 2021-08-16 | Stop reason: SDUPTHER

## 2020-12-30 DIAGNOSIS — I10 ESSENTIAL HYPERTENSION: Chronic | ICD-10-CM

## 2020-12-30 RX ORDER — HYDROCHLOROTHIAZIDE 25 MG/1
25 TABLET ORAL DAILY
Qty: 90 TABLET | Refills: 1 | OUTPATIENT
Start: 2020-12-30

## 2021-01-05 DIAGNOSIS — I10 ESSENTIAL HYPERTENSION: Chronic | ICD-10-CM

## 2021-01-05 RX ORDER — HYDROCHLOROTHIAZIDE 25 MG/1
25 TABLET ORAL DAILY
Qty: 90 TABLET | Refills: 0 | OUTPATIENT
Start: 2021-01-05

## 2021-03-29 ENCOUNTER — PATIENT MESSAGE (OUTPATIENT)
Dept: FAMILY MEDICINE | Facility: CLINIC | Age: 41
End: 2021-03-29

## 2021-04-16 ENCOUNTER — PATIENT MESSAGE (OUTPATIENT)
Dept: RESEARCH | Facility: HOSPITAL | Age: 41
End: 2021-04-16

## 2021-05-13 ENCOUNTER — PATIENT MESSAGE (OUTPATIENT)
Dept: ADMINISTRATIVE | Facility: OTHER | Age: 41
End: 2021-05-13

## 2021-05-13 ENCOUNTER — OFFICE VISIT (OUTPATIENT)
Dept: URGENT CARE | Facility: CLINIC | Age: 41
End: 2021-05-13
Payer: COMMERCIAL

## 2021-05-13 VITALS
WEIGHT: 253 LBS | HEART RATE: 108 BPM | HEIGHT: 67 IN | TEMPERATURE: 99 F | RESPIRATION RATE: 20 BRPM | DIASTOLIC BLOOD PRESSURE: 109 MMHG | BODY MASS INDEX: 39.71 KG/M2 | SYSTOLIC BLOOD PRESSURE: 153 MMHG | OXYGEN SATURATION: 96 %

## 2021-05-13 DIAGNOSIS — G89.29 CHRONIC PAIN OF BOTH SHOULDERS: ICD-10-CM

## 2021-05-13 DIAGNOSIS — M25.562 ACUTE PAIN OF LEFT KNEE: ICD-10-CM

## 2021-05-13 DIAGNOSIS — M54.2 NECK PAIN: ICD-10-CM

## 2021-05-13 DIAGNOSIS — M25.512 CHRONIC PAIN OF BOTH SHOULDERS: ICD-10-CM

## 2021-05-13 DIAGNOSIS — S86.912A KNEE STRAIN, LEFT, INITIAL ENCOUNTER: Primary | ICD-10-CM

## 2021-05-13 DIAGNOSIS — M25.511 CHRONIC PAIN OF BOTH SHOULDERS: ICD-10-CM

## 2021-05-13 DIAGNOSIS — M54.12 CERVICAL RADICULOPATHY: ICD-10-CM

## 2021-05-13 PROCEDURE — 72040 XR CERVICAL SPINE 2 OR 3 VIEWS: ICD-10-PCS | Mod: S$GLB,,, | Performed by: RADIOLOGY

## 2021-05-13 PROCEDURE — 99203 OFFICE O/P NEW LOW 30 MIN: CPT | Mod: S$GLB,,, | Performed by: NURSE PRACTITIONER

## 2021-05-13 PROCEDURE — 73564 X-RAY EXAM KNEE 4 OR MORE: CPT | Mod: LT,S$GLB,, | Performed by: RADIOLOGY

## 2021-05-13 PROCEDURE — 72040 X-RAY EXAM NECK SPINE 2-3 VW: CPT | Mod: S$GLB,,, | Performed by: RADIOLOGY

## 2021-05-13 PROCEDURE — 73564 XR KNEE COMP 4 OR MORE VIEWS LEFT: ICD-10-PCS | Mod: LT,S$GLB,, | Performed by: RADIOLOGY

## 2021-05-13 PROCEDURE — 99203 PR OFFICE/OUTPT VISIT, NEW, LEVL III, 30-44 MIN: ICD-10-PCS | Mod: S$GLB,,, | Performed by: NURSE PRACTITIONER

## 2021-05-13 RX ORDER — METHOCARBAMOL 500 MG/1
500 TABLET, FILM COATED ORAL 3 TIMES DAILY PRN
Qty: 15 TABLET | Refills: 0 | Status: SHIPPED | OUTPATIENT
Start: 2021-05-13 | End: 2021-05-18

## 2021-05-14 ENCOUNTER — TELEPHONE (OUTPATIENT)
Dept: NEUROLOGY | Facility: CLINIC | Age: 41
End: 2021-05-14

## 2021-07-28 DIAGNOSIS — E78.5 HYPERLIPIDEMIA, UNSPECIFIED HYPERLIPIDEMIA TYPE: Chronic | ICD-10-CM

## 2021-07-28 RX ORDER — PRAVASTATIN SODIUM 40 MG/1
TABLET ORAL
Qty: 90 TABLET | Refills: 0 | OUTPATIENT
Start: 2021-07-28

## 2021-07-30 ENCOUNTER — TELEPHONE (OUTPATIENT)
Dept: FAMILY MEDICINE | Facility: CLINIC | Age: 41
End: 2021-07-30

## 2021-07-30 DIAGNOSIS — I10 ESSENTIAL HYPERTENSION: Chronic | ICD-10-CM

## 2021-08-16 ENCOUNTER — LAB VISIT (OUTPATIENT)
Dept: PRIMARY CARE CLINIC | Facility: OTHER | Age: 41
End: 2021-08-16
Attending: INTERNAL MEDICINE
Payer: COMMERCIAL

## 2021-08-16 DIAGNOSIS — Z20.822 EXPOSURE TO COVID-19 VIRUS: Primary | ICD-10-CM

## 2021-08-16 DIAGNOSIS — E78.5 HYPERLIPIDEMIA, UNSPECIFIED HYPERLIPIDEMIA TYPE: ICD-10-CM

## 2021-08-16 DIAGNOSIS — Z20.822 ENCOUNTER FOR LABORATORY TESTING FOR COVID-19 VIRUS: ICD-10-CM

## 2021-08-16 PROCEDURE — U0003 INFECTIOUS AGENT DETECTION BY NUCLEIC ACID (DNA OR RNA); SEVERE ACUTE RESPIRATORY SYNDROME CORONAVIRUS 2 (SARS-COV-2) (CORONAVIRUS DISEASE [COVID-19]), AMPLIFIED PROBE TECHNIQUE, MAKING USE OF HIGH THROUGHPUT TECHNOLOGIES AS DESCRIBED BY CMS-2020-01-R: HCPCS | Performed by: INTERNAL MEDICINE

## 2021-08-16 RX ORDER — PRAVASTATIN SODIUM 40 MG/1
40 TABLET ORAL DAILY
COMMUNITY
Start: 2021-04-30 | End: 2021-08-16 | Stop reason: ALTCHOICE

## 2021-08-16 RX ORDER — ATORVASTATIN CALCIUM 40 MG/1
40 TABLET, FILM COATED ORAL DAILY
Qty: 90 TABLET | Refills: 0 | Status: SHIPPED | OUTPATIENT
Start: 2021-08-16 | End: 2021-09-30

## 2021-08-16 RX ORDER — PRAVASTATIN SODIUM 40 MG/1
40 TABLET ORAL DAILY
Status: CANCELLED | OUTPATIENT
Start: 2021-08-16

## 2021-08-17 LAB
SARS-COV-2 RNA RESP QL NAA+PROBE: NOT DETECTED
SARS-COV-2- CYCLE NUMBER: -1

## 2021-09-20 ENCOUNTER — TELEPHONE (OUTPATIENT)
Dept: ADMINISTRATIVE | Facility: OTHER | Age: 41
End: 2021-09-20

## 2021-09-30 DIAGNOSIS — E78.5 HYPERLIPIDEMIA, UNSPECIFIED HYPERLIPIDEMIA TYPE: ICD-10-CM

## 2021-09-30 RX ORDER — ATORVASTATIN CALCIUM 40 MG/1
TABLET, FILM COATED ORAL
Qty: 90 TABLET | Refills: 0 | Status: SHIPPED | OUTPATIENT
Start: 2021-09-30 | End: 2021-12-06 | Stop reason: SDUPTHER

## 2021-11-03 ENCOUNTER — PATIENT MESSAGE (OUTPATIENT)
Dept: FAMILY MEDICINE | Facility: CLINIC | Age: 41
End: 2021-11-03
Payer: COMMERCIAL

## 2021-11-08 ENCOUNTER — PATIENT MESSAGE (OUTPATIENT)
Dept: FAMILY MEDICINE | Facility: CLINIC | Age: 41
End: 2021-11-08
Payer: COMMERCIAL

## 2021-12-06 ENCOUNTER — OFFICE VISIT (OUTPATIENT)
Dept: FAMILY MEDICINE | Facility: CLINIC | Age: 41
End: 2021-12-06
Payer: COMMERCIAL

## 2021-12-06 VITALS
HEIGHT: 67 IN | BODY MASS INDEX: 40.96 KG/M2 | DIASTOLIC BLOOD PRESSURE: 108 MMHG | OXYGEN SATURATION: 97 % | SYSTOLIC BLOOD PRESSURE: 156 MMHG | HEART RATE: 69 BPM | WEIGHT: 260.94 LBS | TEMPERATURE: 98 F

## 2021-12-06 DIAGNOSIS — R09.82 POST-NASAL DRIP: ICD-10-CM

## 2021-12-06 DIAGNOSIS — I10 ESSENTIAL HYPERTENSION: Chronic | ICD-10-CM

## 2021-12-06 DIAGNOSIS — R06.83 SNORING: ICD-10-CM

## 2021-12-06 DIAGNOSIS — Z23 NEED FOR IMMUNIZATION AGAINST INFLUENZA: ICD-10-CM

## 2021-12-06 DIAGNOSIS — E66.01 SEVERE OBESITY (BMI 35.0-39.9) WITH COMORBIDITY: Chronic | ICD-10-CM

## 2021-12-06 DIAGNOSIS — E78.5 HYPERLIPIDEMIA, UNSPECIFIED HYPERLIPIDEMIA TYPE: ICD-10-CM

## 2021-12-06 DIAGNOSIS — Z00.00 ROUTINE GENERAL MEDICAL EXAMINATION AT A HEALTH CARE FACILITY: Primary | ICD-10-CM

## 2021-12-06 PROCEDURE — 99999 PR PBB SHADOW E&M-EST. PATIENT-LVL IV: ICD-10-PCS | Mod: PBBFAC,,, | Performed by: FAMILY MEDICINE

## 2021-12-06 PROCEDURE — 99396 PR PREVENTIVE VISIT,EST,40-64: ICD-10-PCS | Mod: S$GLB,,, | Performed by: FAMILY MEDICINE

## 2021-12-06 PROCEDURE — 4010F ACE/ARB THERAPY RXD/TAKEN: CPT | Mod: CPTII,S$GLB,, | Performed by: FAMILY MEDICINE

## 2021-12-06 PROCEDURE — 99999 PR PBB SHADOW E&M-EST. PATIENT-LVL IV: CPT | Mod: PBBFAC,,, | Performed by: FAMILY MEDICINE

## 2021-12-06 PROCEDURE — 4010F PR ACE/ARB THEARPY RXD/TAKEN: ICD-10-PCS | Mod: CPTII,S$GLB,, | Performed by: FAMILY MEDICINE

## 2021-12-06 PROCEDURE — 99396 PREV VISIT EST AGE 40-64: CPT | Mod: S$GLB,,, | Performed by: FAMILY MEDICINE

## 2021-12-06 RX ORDER — LISINOPRIL 40 MG/1
40 TABLET ORAL DAILY
Qty: 90 TABLET | Refills: 1 | Status: SHIPPED | OUTPATIENT
Start: 2021-12-06 | End: 2022-04-09

## 2021-12-06 RX ORDER — FLUTICASONE PROPIONATE 50 MCG
1 SPRAY, SUSPENSION (ML) NASAL DAILY
Qty: 16 G | Refills: 5 | Status: SHIPPED | OUTPATIENT
Start: 2021-12-06 | End: 2022-07-27

## 2021-12-06 RX ORDER — NIFEDIPINE 90 MG/1
90 TABLET, EXTENDED RELEASE ORAL DAILY
Qty: 90 TABLET | Refills: 1 | Status: SHIPPED | OUTPATIENT
Start: 2021-12-06 | End: 2022-06-18

## 2021-12-06 RX ORDER — ATORVASTATIN CALCIUM 40 MG/1
TABLET, FILM COATED ORAL
Qty: 90 TABLET | Refills: 1 | Status: SHIPPED | OUTPATIENT
Start: 2021-12-06 | End: 2022-03-19

## 2021-12-07 ENCOUNTER — LAB VISIT (OUTPATIENT)
Dept: LAB | Facility: HOSPITAL | Age: 41
End: 2021-12-07
Attending: FAMILY MEDICINE
Payer: COMMERCIAL

## 2021-12-07 DIAGNOSIS — Z00.00 ROUTINE GENERAL MEDICAL EXAMINATION AT A HEALTH CARE FACILITY: ICD-10-CM

## 2021-12-07 LAB
ALBUMIN SERPL BCP-MCNC: 4.3 G/DL (ref 3.5–5.2)
ALP SERPL-CCNC: 60 U/L (ref 55–135)
ALT SERPL W/O P-5'-P-CCNC: 61 U/L (ref 10–44)
ANION GAP SERPL CALC-SCNC: 12 MMOL/L (ref 8–16)
AST SERPL-CCNC: 22 U/L (ref 10–40)
BASOPHILS # BLD AUTO: 0.07 K/UL (ref 0–0.2)
BASOPHILS NFR BLD: 0.9 % (ref 0–1.9)
BILIRUB SERPL-MCNC: 0.7 MG/DL (ref 0.1–1)
BUN SERPL-MCNC: 11 MG/DL (ref 6–20)
CALCIUM SERPL-MCNC: 9.6 MG/DL (ref 8.7–10.5)
CHLORIDE SERPL-SCNC: 103 MMOL/L (ref 95–110)
CHOLEST SERPL-MCNC: 161 MG/DL (ref 120–199)
CHOLEST/HDLC SERPL: 4.4 {RATIO} (ref 2–5)
CO2 SERPL-SCNC: 26 MMOL/L (ref 23–29)
CREAT SERPL-MCNC: 1.1 MG/DL (ref 0.5–1.4)
DIFFERENTIAL METHOD: ABNORMAL
EOSINOPHIL # BLD AUTO: 0.3 K/UL (ref 0–0.5)
EOSINOPHIL NFR BLD: 4 % (ref 0–8)
ERYTHROCYTE [DISTWIDTH] IN BLOOD BY AUTOMATED COUNT: 13 % (ref 11.5–14.5)
EST. GFR  (AFRICAN AMERICAN): >60 ML/MIN/1.73 M^2
EST. GFR  (NON AFRICAN AMERICAN): >60 ML/MIN/1.73 M^2
ESTIMATED AVG GLUCOSE: 91 MG/DL (ref 68–131)
GLUCOSE SERPL-MCNC: 93 MG/DL (ref 70–110)
HBA1C MFR BLD: 4.8 % (ref 4–5.6)
HCT VFR BLD AUTO: 45.7 % (ref 40–54)
HDLC SERPL-MCNC: 37 MG/DL (ref 40–75)
HDLC SERPL: 23 % (ref 20–50)
HGB BLD-MCNC: 15.6 G/DL (ref 14–18)
IMM GRANULOCYTES # BLD AUTO: 0.03 K/UL (ref 0–0.04)
IMM GRANULOCYTES NFR BLD AUTO: 0.4 % (ref 0–0.5)
LDLC SERPL CALC-MCNC: 54.8 MG/DL (ref 63–159)
LYMPHOCYTES # BLD AUTO: 2.3 K/UL (ref 1–4.8)
LYMPHOCYTES NFR BLD: 31.1 % (ref 18–48)
MCH RBC QN AUTO: 31.6 PG (ref 27–31)
MCHC RBC AUTO-ENTMCNC: 34.1 G/DL (ref 32–36)
MCV RBC AUTO: 93 FL (ref 82–98)
MONOCYTES # BLD AUTO: 0.6 K/UL (ref 0.3–1)
MONOCYTES NFR BLD: 8.2 % (ref 4–15)
NEUTROPHILS # BLD AUTO: 4.1 K/UL (ref 1.8–7.7)
NEUTROPHILS NFR BLD: 55.4 % (ref 38–73)
NONHDLC SERPL-MCNC: 124 MG/DL
NRBC BLD-RTO: 0 /100 WBC
PLATELET # BLD AUTO: 259 K/UL (ref 150–450)
PMV BLD AUTO: 11.5 FL (ref 9.2–12.9)
POTASSIUM SERPL-SCNC: 3.6 MMOL/L (ref 3.5–5.1)
PROT SERPL-MCNC: 7.9 G/DL (ref 6–8.4)
RBC # BLD AUTO: 4.94 M/UL (ref 4.6–6.2)
SODIUM SERPL-SCNC: 141 MMOL/L (ref 136–145)
TRIGL SERPL-MCNC: 346 MG/DL (ref 30–150)
TSH SERPL DL<=0.005 MIU/L-ACNC: 1.67 UIU/ML (ref 0.4–4)
WBC # BLD AUTO: 7.43 K/UL (ref 3.9–12.7)

## 2021-12-07 PROCEDURE — 85025 COMPLETE CBC W/AUTO DIFF WBC: CPT | Performed by: FAMILY MEDICINE

## 2021-12-07 PROCEDURE — 80061 LIPID PANEL: CPT | Performed by: FAMILY MEDICINE

## 2021-12-07 PROCEDURE — 36415 COLL VENOUS BLD VENIPUNCTURE: CPT | Mod: PO | Performed by: FAMILY MEDICINE

## 2021-12-07 PROCEDURE — 84443 ASSAY THYROID STIM HORMONE: CPT | Performed by: FAMILY MEDICINE

## 2021-12-07 PROCEDURE — 83036 HEMOGLOBIN GLYCOSYLATED A1C: CPT | Performed by: FAMILY MEDICINE

## 2021-12-07 PROCEDURE — 80053 COMPREHEN METABOLIC PANEL: CPT | Performed by: FAMILY MEDICINE

## 2021-12-13 ENCOUNTER — TELEPHONE (OUTPATIENT)
Dept: SLEEP MEDICINE | Facility: CLINIC | Age: 41
End: 2021-12-13
Payer: COMMERCIAL

## 2021-12-14 ENCOUNTER — TELEPHONE (OUTPATIENT)
Dept: FAMILY MEDICINE | Facility: CLINIC | Age: 41
End: 2021-12-14
Payer: COMMERCIAL

## 2021-12-15 ENCOUNTER — PATIENT MESSAGE (OUTPATIENT)
Dept: ADMINISTRATIVE | Facility: HOSPITAL | Age: 41
End: 2021-12-15
Payer: COMMERCIAL

## 2022-03-14 DIAGNOSIS — I10 ESSENTIAL HYPERTENSION: Chronic | ICD-10-CM

## 2022-03-14 RX ORDER — CHLORTHALIDONE 50 MG/1
TABLET ORAL
Qty: 90 TABLET | Refills: 1 | OUTPATIENT
Start: 2022-03-14

## 2022-03-14 NOTE — TELEPHONE ENCOUNTER
No new care gaps identified.  Powered by [employer] by [employer]. Reference number: [id number].   3/14/2022 8:12:37 AM CDT

## 2022-03-15 DIAGNOSIS — E78.5 HYPERLIPIDEMIA, UNSPECIFIED HYPERLIPIDEMIA TYPE: ICD-10-CM

## 2022-03-15 NOTE — TELEPHONE ENCOUNTER
No new care gaps identified.  Powered by [email or web address] by [employer]. Reference number: [id number].   3/15/2022 3:53:33 AM CDT

## 2022-03-19 RX ORDER — ATORVASTATIN CALCIUM 40 MG/1
TABLET, FILM COATED ORAL
Qty: 90 TABLET | Refills: 2 | Status: SHIPPED | OUTPATIENT
Start: 2022-03-19 | End: 2022-12-13 | Stop reason: SDUPTHER

## 2022-03-19 NOTE — TELEPHONE ENCOUNTER
Refill Authorization Note   Romain French  is requesting a refill authorization.  Brief Assessment and Rationale for Refill:  Approve     Medication Therapy Plan:       Medication Reconciliation Completed: No   Comments:   --->Care Gap information included below if applicable.       Requested Prescriptions   Pending Prescriptions Disp Refills    atorvastatin (LIPITOR) 40 MG tablet [Pharmacy Med Name: ATORVASTATIN 40MG TABLETS] 90 tablet 2     Sig: TAKE 1 TABLET(40 MG) BY MOUTH EVERY DAY. STOP PRAVASTATIN       Cardiovascular:  Antilipid - Statins Passed - 3/19/2022 10:48 AM        Passed - Patient is at least 18 years old        Passed - Valid encounter within last 15 months     Recent Visits  Date Type Provider Dept   12/06/21 Office Visit [facility]   10/28/20 Office Visit MD [facility]   Showing recent visits within past 720 days and meeting all other requirements  Future Appointments  No visits were found meeting these conditions.  Showing future appointments within next 150 days and meeting all other requirements                Passed - ALT is 131 or below and within 360 days     ALT   Date Value Ref Range Status   12/07/2021 61 (H) 10 - 44 U/L Final   11/05/2020 39 10 - 44 U/L Final   06/17/2020 40 10 - 44 U/L Final              Passed - AST is 119 or below and within 360 days     AST   Date Value Ref Range Status   12/07/2021 22 10 - 40 U/L Final   11/05/2020 25 10 - 40 U/L Final   06/17/2020 26 10 - 40 U/L Final              Passed - Total Cholesterol within 360 days     Lab Results   Component Value Date    CHOL 161 12/07/2021    CHOL 206 (H) 11/05/2020    CHOL 161 03/22/2019              Passed - LDL within 360 days     LDL Calculated   Date Value Ref Range Status   03/22/2019 92 0 - 99 mg/dL Final     LDL Cholesterol   Date Value Ref Range Status   12/07/2021 54.8 (L) 63.0 - 159.0 mg/dL Final     Comment:     The National  Cholesterol Education Program (NCEP) has set the  following guidelines (reference values) for LDL Cholesterol:  Optimal.......................<130 mg/dL  Borderline High...............130-159 mg/dL  High..........................160-189 mg/dL  Very High.....................>190 mg/dL              Passed - HDL within 360 days     HDL   Date Value Ref Range Status   12/07/2021 37 (L) 40 - 75 mg/dL Final     Comment:     The National Cholesterol Education Program (NCEP) has set the  following guidelines (reference values) for HDL Cholesterol:  Low...............<40 mg/dL  Optimal...........>60 mg/dL              Passed - Triglycerides within 360 days     Lab Results   Component Value Date    TRIG 346 (H) 12/07/2021    TRIG 247 (H) 11/05/2020    TRIG 145 03/22/2019                  Appointments  past 12m or future 3m with PCP    Date Provider   Last Visit   12/6/2021 Pricilla Ferrara MD   Next Visit   Visit date not found Pricilla Ferrara MD   ED visits in past 90 days: 0     Note composed:10:50 AM 03/19/2022

## 2022-04-08 DIAGNOSIS — I10 ESSENTIAL HYPERTENSION: Chronic | ICD-10-CM

## 2022-04-08 NOTE — TELEPHONE ENCOUNTER
No new care gaps identified.  Powered by [employer] by [email or web address]. Reference number: [id number].   4/08/2022 5:48:44 AM CDT

## 2022-04-09 NOTE — TELEPHONE ENCOUNTER
Refill Routing Note   Medication(s) are not appropriate for processing by [facility] for the following reason(s):      - Required laboratory values are abnormal    [facility] action(s):  Defer          Medication reconciliation completed: No     Appointments  past 12m or future 3m with PCP    Date Provider   Last Visit   12/6/2021 Pricilla Ferrara MD   Next Visit   Visit date not found Pricilla Ferrara MD   ED visits in past 90 days: 0        Note composed:11:31 AM 04/09/2022

## 2022-04-11 RX ORDER — LISINOPRIL 40 MG/1
TABLET ORAL
Qty: 90 TABLET | Refills: 0 | Status: SHIPPED | OUTPATIENT
Start: 2022-04-11 | End: 2022-07-27 | Stop reason: SDUPTHER

## 2022-05-07 ENCOUNTER — OFFICE VISIT (OUTPATIENT)
Dept: URGENT CARE | Facility: CLINIC | Age: 42
End: 2022-05-07
Payer: MEDICAID

## 2022-05-07 VITALS
DIASTOLIC BLOOD PRESSURE: 90 MMHG | HEART RATE: 99 BPM | TEMPERATURE: 99 F | RESPIRATION RATE: 17 BRPM | SYSTOLIC BLOOD PRESSURE: 130 MMHG | HEIGHT: 67 IN | BODY MASS INDEX: 40.81 KG/M2 | WEIGHT: 260 LBS | OXYGEN SATURATION: 97 %

## 2022-05-07 DIAGNOSIS — F41.9 ANXIETY: ICD-10-CM

## 2022-05-07 DIAGNOSIS — K21.9 GASTROESOPHAGEAL REFLUX DISEASE, UNSPECIFIED WHETHER ESOPHAGITIS PRESENT: Primary | ICD-10-CM

## 2022-05-07 PROCEDURE — 71046 X-RAY EXAM CHEST 2 VIEWS: CPT | Mod: S$GLB,,, | Performed by: RADIOLOGY

## 2022-05-07 PROCEDURE — 3008F BODY MASS INDEX DOCD: CPT | Mod: CPTII,S$GLB,,

## 2022-05-07 PROCEDURE — 93010 ELECTROCARDIOGRAM REPORT: CPT | Mod: S$PBB,,, | Performed by: INTERNAL MEDICINE

## 2022-05-07 PROCEDURE — 1159F MED LIST DOCD IN RCRD: CPT | Mod: CPTII,S$GLB,,

## 2022-05-07 PROCEDURE — 99213 OFFICE O/P EST LOW 20 MIN: CPT | Mod: S$GLB,,,

## 2022-05-07 PROCEDURE — 4010F PR ACE/ARB THEARPY RXD/TAKEN: ICD-10-PCS | Mod: CPTII,S$GLB,,

## 2022-05-07 PROCEDURE — 71046 XR CHEST PA AND LATERAL: ICD-10-PCS | Mod: S$GLB,,, | Performed by: RADIOLOGY

## 2022-05-07 PROCEDURE — 3080F DIAST BP >= 90 MM HG: CPT | Mod: CPTII,S$GLB,,

## 2022-05-07 PROCEDURE — 1159F PR MEDICATION LIST DOCUMENTED IN MEDICAL RECORD: ICD-10-PCS | Mod: CPTII,S$GLB,,

## 2022-05-07 PROCEDURE — 1160F PR REVIEW ALL MEDS BY PRESCRIBER/CLIN PHARMACIST DOCUMENTED: ICD-10-PCS | Mod: CPTII,S$GLB,,

## 2022-05-07 PROCEDURE — 1160F RVW MEDS BY RX/DR IN RCRD: CPT | Mod: CPTII,S$GLB,,

## 2022-05-07 PROCEDURE — 3075F PR MOST RECENT SYSTOLIC BLOOD PRESS GE 130-139MM HG: ICD-10-PCS | Mod: CPTII,S$GLB,,

## 2022-05-07 PROCEDURE — 93005 EKG 12-LEAD: ICD-10-PCS | Mod: S$GLB,,,

## 2022-05-07 PROCEDURE — 99213 PR OFFICE/OUTPT VISIT, EST, LEVL III, 20-29 MIN: ICD-10-PCS | Mod: S$GLB,,,

## 2022-05-07 PROCEDURE — 93005 ELECTROCARDIOGRAM TRACING: CPT | Mod: S$GLB,,,

## 2022-05-07 PROCEDURE — 3075F SYST BP GE 130 - 139MM HG: CPT | Mod: CPTII,S$GLB,,

## 2022-05-07 PROCEDURE — 3008F PR BODY MASS INDEX (BMI) DOCUMENTED: ICD-10-PCS | Mod: CPTII,S$GLB,,

## 2022-05-07 PROCEDURE — 93010 EKG 12-LEAD: ICD-10-PCS | Mod: S$PBB,,, | Performed by: INTERNAL MEDICINE

## 2022-05-07 PROCEDURE — 3080F PR MOST RECENT DIASTOLIC BLOOD PRESSURE >= 90 MM HG: ICD-10-PCS | Mod: CPTII,S$GLB,,

## 2022-05-07 PROCEDURE — 4010F ACE/ARB THERAPY RXD/TAKEN: CPT | Mod: CPTII,S$GLB,,

## 2022-05-07 RX ORDER — MAG HYDROX/ALUMINUM HYD/SIMETH 200-200-20
30 SUSPENSION, ORAL (FINAL DOSE FORM) ORAL
Status: COMPLETED | OUTPATIENT
Start: 2022-05-07 | End: 2022-05-07

## 2022-05-07 RX ORDER — OMEPRAZOLE 20 MG/1
20 CAPSULE, DELAYED RELEASE ORAL DAILY
Qty: 30 CAPSULE | Refills: 0 | Status: SHIPPED | OUTPATIENT
Start: 2022-05-07 | End: 2023-03-08

## 2022-05-07 RX ORDER — DICYCLOMINE HYDROCHLORIDE 10 MG/5ML
20 SOLUTION ORAL
Status: COMPLETED | OUTPATIENT
Start: 2022-05-07 | End: 2022-05-07

## 2022-05-07 RX ORDER — LIDOCAINE HYDROCHLORIDE 20 MG/ML
10 SOLUTION OROPHARYNGEAL
Status: COMPLETED | OUTPATIENT
Start: 2022-05-07 | End: 2022-05-07

## 2022-05-07 RX ORDER — NIFEDIPINE 30 MG/1
30 TABLET, FILM COATED, EXTENDED RELEASE ORAL NIGHTLY
COMMUNITY
End: 2022-12-13 | Stop reason: SDUPTHER

## 2022-05-07 RX ADMIN — Medication 30 ML: at 12:05

## 2022-05-07 RX ADMIN — DICYCLOMINE HYDROCHLORIDE 20 MG: 10 SOLUTION ORAL at 12:05

## 2022-05-07 RX ADMIN — LIDOCAINE HYDROCHLORIDE 10 ML: 20 SOLUTION OROPHARYNGEAL at 12:05

## 2022-05-07 NOTE — PATIENT INSTRUCTIONS
Reflux Discharge Instructions  Please return here or go to the Emergency Department for any concerns or worsening of condition.   Follow-up with your PCP.  If you were prescribed antibiotics, please take them to completion.   If you were prescribed a narcotic medication, do not drive or operate heavy equipment or machinery while taking these medications.   If you were not prescribed any reflux medications, it is OK to take over the counter Pepcid or Zantac or Prilosec as directed.   Please drink plenty of fluids.   Please get plenty of rest.   Sit up at least 30 minutes after eating a meal; avoid eating late evening meals 2 hours before bedtime.  Avoid food that can make your symptoms worse such as chocolate, peppermint and fatty foods. Avoid Alcohol.  Elevate your head of bed by about 6 to 8 inches while sleeping (examples:  Putting blocks of wood or rubber under 2 legs of the bed or Styrofoam wedge under the mattress).  If you smoke, stop smoking.

## 2022-05-07 NOTE — PROGRESS NOTES
"Subjective:       Patient ID: Romain French is a 42 y.o. male.    Vitals:  height is 5' 7" (1.702 m) and weight is 117.9 kg (260 lb). His temperature is 98.9 °F (37.2 °C). His blood pressure is 130/90 (abnormal) and his pulse is 99. His respiration is 17 and oxygen saturation is 97%.     Chief Complaint: Hypertension    Patient is a 42-year-old male with a history of hypertension, hyperlipidemia, anxiety, GERD, gallstones s/p cholecystectomy who presents with elevated blood pressure readings at home for the past 3 days.  Yesterday was 153/111.  Today was 133/109.  States that on average his blood pressure is around 130/90.  Patient takes lisinopril 40 mg in AM, 90 mg procardia in AM, 30 mg procardia at PM.  He has been having some intermittent nausea, but states that he feels this is probably related to anxiety.  States he worries about most things most of the time.  He does state that he takes edibles continue marijuana about once per week for his anxiety.  He noticed his blood pressure was elevated after he took an edible 2 days ago.  No other recreational drug use.  Also states that he is feeling some right upper quadrant and epigastric discomfort that feels like a burning sensation.  He does have a history of GERD that usually presents as epigastric pain.   GERD worse with spicy foods or acidic foods such as spaghetti sauce.  Worse with lying down.  No association of epigastric pain with exertion.  Takes Pepto-Bismol for acid reflux but does not feel it has been helping much.  He has previously taken omeprazole but has run out.  No smoking. +alcohol use.  One glass of whiskey per day.  Last bowel movement this AM.  Recently started pescatarian diet about 1 week ago.  No personal or family history of MI.  denies any fever, chills, shortness of breath, chest pain, vomiting, diarrhea, constipation, dizziness, acute vision changes, headache, numbness, tingling, focal weakness, loss of sensation.    Hypertension  This " is a recurrent problem. The current episode started in the past 7 days. The problem is unchanged. The problem is controlled. Associated symptoms include anxiety and malaise/fatigue. Pertinent negatives include no blurred vision, chest pain, headaches, neck pain, orthopnea, palpitations, peripheral edema, PND, shortness of breath or sweats. Risk factors for coronary artery disease include family history. There are no compliance problems.        Constitution: Positive for fatigue. Negative for chills, sweating and fever.   HENT: Negative for ear pain, ear discharge, congestion, postnasal drip, sinus pain, sinus pressure and sore throat.    Neck: Negative for neck pain and neck stiffness.   Cardiovascular: Negative for chest trauma, chest pain, leg swelling, palpitations and sob on exertion.   Eyes: Negative for blurred vision.   Respiratory: Negative for chest tightness, cough and shortness of breath.    Gastrointestinal: Positive for abdominal pain, history of abdominal surgery, nausea and heartburn. Negative for vomiting, constipation, diarrhea, bright red blood in stool, dark colored stools, rectal bleeding and rectal pain.   Musculoskeletal: Negative for joint pain and joint swelling.   Skin: Negative for rash.   Neurological: Negative for dizziness, light-headedness, passing out, speech difficulty, coordination disturbances, headaches, numbness and tingling.       Objective:      Physical Exam   Constitutional: He is oriented to person, place, and time. He appears well-developed. He is cooperative.  Non-toxic appearance. He does not appear ill. No distress.   HENT:   Head: Normocephalic and atraumatic.   Ears:   Right Ear: Hearing, tympanic membrane, external ear and ear canal normal.   Left Ear: Hearing, tympanic membrane, external ear and ear canal normal.   Nose: Nose normal. No mucosal edema, rhinorrhea or nasal deformity. No epistaxis. Right sinus exhibits no maxillary sinus tenderness and no frontal sinus  tenderness. Left sinus exhibits no maxillary sinus tenderness and no frontal sinus tenderness.   Mouth/Throat: Uvula is midline, oropharynx is clear and moist and mucous membranes are normal. Mucous membranes are moist. No trismus in the jaw. Normal dentition. No uvula swelling. No posterior oropharyngeal erythema. Oropharynx is clear.   Eyes: Conjunctivae and lids are normal. Right eye exhibits no discharge. Left eye exhibits no discharge. No scleral icterus.   Neck: Trachea normal and phonation normal. Neck supple.   Cardiovascular: Normal rate, regular rhythm, normal heart sounds and normal pulses.   Pulmonary/Chest: Effort normal and breath sounds normal. No respiratory distress. He has no wheezes. He has no rhonchi. He has no rales. He exhibits no tenderness.   Abdominal: Normal appearance and bowel sounds are normal. He exhibits no distension, no pulsatile midline mass and no mass. Soft. There is no abdominal tenderness. There is no rebound, no guarding, no left CVA tenderness and no right CVA tenderness.   Musculoskeletal: Normal range of motion.         General: No deformity. Normal range of motion.   Neurological: no focal deficit. He is alert and oriented to person, place, and time. He exhibits normal muscle tone. Coordination normal.   Skin: Skin is warm, dry, intact, not diaphoretic and not pale. Capillary refill takes less than 2 seconds.   Psychiatric: His speech is normal and behavior is normal. Judgment and thought content normal.   Nursing note and vitals reviewed.    XR CHEST PA AND LATERAL    Result Date: 5/7/2022  EXAMINATION: XR CHEST PA AND LATERAL CLINICAL HISTORY: Gastro-esophageal reflux disease without esophagitis TECHNIQUE: PA and lateral views of the chest were performed. COMPARISON: 06/17/2020 FINDINGS: The lungs are clear, with normal appearance of pulmonary vasculature.No pleural effusion.No pneumothorax. The cardiac silhouette is normal in size. The hilar and mediastinal contours are  unremarkable. Bones are intact.     No acute abnormality. Electronically signed by: India Maxwell MD Date:    05/07/2022 Time:    13:25    EKG: HR 86, NSR, No acute ST segment elevation..        Assessment:       1. Gastroesophageal reflux disease, unspecified whether esophagitis present    2. Anxiety          Plan:         Gastroesophageal reflux disease, unspecified whether esophagitis present  -     IN OFFICE EKG 12-LEAD (to [place])  -     XR CHEST PA AND LATERAL; Future; Expected date: 05/07/2022  -     LIDOcaine HCl 2% oral solution 10 mL  -     dicyclomine 10 mg/5 mL syrup 20 mg  -     aluminum-magnesium hydroxide-simethicone 200-200-20 mg/5 mL suspension 30 mL  -     omeprazole (PRILOSEC) 20 MG capsule; Take 1 capsule (20 mg total) by mouth once daily.  Dispense: 30 capsule; Refill: 0    Anxiety           Medical Decision Making:   Initial Assessment:   Patient is a 42-year-old male with a history of hypertension, hyperlipidemia, anxiety, GERD, gallstones s/p cholecystectomy a presents with elevated blood pressure readings at home for the past 3 days. Noticed his BP readings were high after taking an edible for anxiety. Intermittent nausea but feels like it is related to his anxiety. Epigastric and RUQ discomfort a/w food. /85. Manual /90. Other VSS. On exam, pt nontoxic and in NAD. Afebrile. Lungs CTAB. RRR. NTND abdomen. No guarding or rebound. Neurologically intact without FNDs. EKG with NSR and no ischemic changes. CXR with no acute abnormality. Epigastric and RUQ pain c/w GERD. Pt reports significant improvement following GI cocktail. Sent omeprazole for GERD. Elevated BP readings possibly due to edible use. Low suspicion for cardiac etiology. Advised pt to f/up with PCP for anxiety. Continue to monitor BP readings at home. Clinic return and ED precautions given. Pt verbalizes understanding and agrees with plan.       Patient Instructions   Reflux Discharge Instructions  Please return here or  go to the Emergency Department for any concerns or worsening of condition.   Follow-up with your PCP.  If you were prescribed antibiotics, please take them to completion.   If you were prescribed a narcotic medication, do not drive or operate heavy equipment or machinery while taking these medications.   If you were not prescribed any reflux medications, it is OK to take over the counter Pepcid or Zantac or Prilosec as directed.   Please drink plenty of fluids.   Please get plenty of rest.   Sit up at least 30 minutes after eating a meal; avoid eating late evening meals 2 hours before bedtime.  Avoid food that can make your symptoms worse such as chocolate, peppermint and fatty foods. Avoid Alcohol.  Elevate your head of bed by about 6 to 8 inches while sleeping (examples:  Putting blocks of wood or rubber under 2 legs of the bed or Styrofoam wedge under the mattress).  If you smoke, stop smoking.

## 2022-05-10 ENCOUNTER — TELEPHONE (OUTPATIENT)
Dept: FAMILY MEDICINE | Facility: CLINIC | Age: 42
End: 2022-05-10
Payer: MEDICAID

## 2022-05-10 NOTE — TELEPHONE ENCOUNTER
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

## 2022-07-27 ENCOUNTER — OFFICE VISIT (OUTPATIENT)
Dept: FAMILY MEDICINE | Facility: CLINIC | Age: 42
End: 2022-07-27
Payer: MEDICAID

## 2022-07-27 VITALS
BODY MASS INDEX: 39.46 KG/M2 | WEIGHT: 251.44 LBS | HEIGHT: 67 IN | OXYGEN SATURATION: 98 % | SYSTOLIC BLOOD PRESSURE: 120 MMHG | TEMPERATURE: 99 F | DIASTOLIC BLOOD PRESSURE: 88 MMHG | HEART RATE: 99 BPM

## 2022-07-27 DIAGNOSIS — E66.01 SEVERE OBESITY (BMI 35.0-39.9) WITH COMORBIDITY: Chronic | ICD-10-CM

## 2022-07-27 DIAGNOSIS — I10 ESSENTIAL HYPERTENSION: Primary | Chronic | ICD-10-CM

## 2022-07-27 DIAGNOSIS — E78.5 HYPERLIPIDEMIA, UNSPECIFIED HYPERLIPIDEMIA TYPE: Chronic | ICD-10-CM

## 2022-07-27 PROCEDURE — 4010F ACE/ARB THERAPY RXD/TAKEN: CPT | Mod: CPTII,,, | Performed by: NURSE PRACTITIONER

## 2022-07-27 PROCEDURE — 1159F MED LIST DOCD IN RCRD: CPT | Mod: CPTII,,, | Performed by: NURSE PRACTITIONER

## 2022-07-27 PROCEDURE — 3074F SYST BP LT 130 MM HG: CPT | Mod: CPTII,,, | Performed by: NURSE PRACTITIONER

## 2022-07-27 PROCEDURE — 99215 PR OFFICE/OUTPT VISIT, EST, LEVL V, 40-54 MIN: ICD-10-PCS | Mod: S$PBB,,, | Performed by: NURSE PRACTITIONER

## 2022-07-27 PROCEDURE — 99214 OFFICE O/P EST MOD 30 MIN: CPT | Mod: PBBFAC,PO | Performed by: NURSE PRACTITIONER

## 2022-07-27 PROCEDURE — 99999 PR PBB SHADOW E&M-EST. PATIENT-LVL IV: CPT | Mod: PBBFAC,,, | Performed by: NURSE PRACTITIONER

## 2022-07-27 PROCEDURE — 1159F PR MEDICATION LIST DOCUMENTED IN MEDICAL RECORD: ICD-10-PCS | Mod: CPTII,,, | Performed by: NURSE PRACTITIONER

## 2022-07-27 PROCEDURE — 3008F BODY MASS INDEX DOCD: CPT | Mod: CPTII,,, | Performed by: NURSE PRACTITIONER

## 2022-07-27 PROCEDURE — 3079F DIAST BP 80-89 MM HG: CPT | Mod: CPTII,,, | Performed by: NURSE PRACTITIONER

## 2022-07-27 PROCEDURE — 3008F PR BODY MASS INDEX (BMI) DOCUMENTED: ICD-10-PCS | Mod: CPTII,,, | Performed by: NURSE PRACTITIONER

## 2022-07-27 PROCEDURE — 4010F PR ACE/ARB THEARPY RXD/TAKEN: ICD-10-PCS | Mod: CPTII,,, | Performed by: NURSE PRACTITIONER

## 2022-07-27 PROCEDURE — 3079F PR MOST RECENT DIASTOLIC BLOOD PRESSURE 80-89 MM HG: ICD-10-PCS | Mod: CPTII,,, | Performed by: NURSE PRACTITIONER

## 2022-07-27 PROCEDURE — 3074F PR MOST RECENT SYSTOLIC BLOOD PRESSURE < 130 MM HG: ICD-10-PCS | Mod: CPTII,,, | Performed by: NURSE PRACTITIONER

## 2022-07-27 PROCEDURE — 99999 PR PBB SHADOW E&M-EST. PATIENT-LVL IV: ICD-10-PCS | Mod: PBBFAC,,, | Performed by: NURSE PRACTITIONER

## 2022-07-27 PROCEDURE — 1160F PR REVIEW ALL MEDS BY PRESCRIBER/CLIN PHARMACIST DOCUMENTED: ICD-10-PCS | Mod: CPTII,,, | Performed by: NURSE PRACTITIONER

## 2022-07-27 PROCEDURE — 99215 OFFICE O/P EST HI 40 MIN: CPT | Mod: S$PBB,,, | Performed by: NURSE PRACTITIONER

## 2022-07-27 PROCEDURE — 1160F RVW MEDS BY RX/DR IN RCRD: CPT | Mod: CPTII,,, | Performed by: NURSE PRACTITIONER

## 2022-07-27 RX ORDER — LISINOPRIL 40 MG/1
40 TABLET ORAL DAILY
Qty: 90 TABLET | Refills: 1 | Status: SHIPPED | OUTPATIENT
Start: 2022-07-27 | End: 2022-12-13 | Stop reason: SDUPTHER

## 2022-07-27 RX ORDER — CHLORTHALIDONE 50 MG/1
50 TABLET ORAL DAILY
Qty: 90 TABLET | Refills: 1 | Status: SHIPPED | OUTPATIENT
Start: 2022-07-27 | End: 2022-12-13 | Stop reason: SDUPTHER

## 2022-07-27 NOTE — PROGRESS NOTES
Chief Complaint  Chief Complaint   Patient presents with    Hypertension    Follow-up       HPI  Romain French is a 42 y.o. male with multiple medical diagnoses as listed in the medical history and problem list that presents for HTN. This patient is new to me.     1. Elevated Blood Pressure Reading in Office with a Diagnosis of HTN: Reports taking Procardia 90 , Lisinopril 40  and Hygroten 50. Reports he does take Nightly Adalat without a specific reason . He forgets to take the medication. B/P at first reading in clinic is 140/100. Denies headaches, changes in vision, chest pain/discomfort or changes in bowel and bladder.       PAST MEDICAL HISTORY:  Past Medical History:   Diagnosis Date    Anxiety     Hyperlipidemia     Hypertension     Obesity (BMI 30-39.9)        PAST SURGICAL HISTORY:  Past Surgical History:   Procedure Laterality Date    CHOLECYSTECTOMY      LAPAROSCOPIC CHOLECYSTECTOMY N/A 6/24/2020    Procedure: CHOLECYSTECTOMY, LAPAROSCOPIC;  Surgeon: Martin Kee MD;  Location: [facility] OR;  Service: General;  Laterality: N/A;  [facility] FOR SP[facility] WILL BE HERE FOR CASE  RN PREOP 6/17/2020-----COVID NEGATIVE       SOCIAL HISTORY:  Social History     Socioeconomic History    Marital status:    Tobacco Use    Smoking status: Never Smoker    Smokeless tobacco: Never Used   Substance and Sexual Activity    Alcohol use: Yes     Alcohol/week: 2.0 standard drinks     Types: 2 Shots of liquor per week     Comment: daily    Drug use: Yes     Types: Marijuana    Sexual activity: Yes       FAMILY HISTORY:  History reviewed. No pertinent family history.    ALLERGIES AND MEDICATIONS: updated and reviewed.  Review of patient's allergies indicates:  No Known Allergies  Current Outpatient Medications   Medication Sig Dispense Refill    atorvastatin (LIPITOR) 40 MG tablet TAKE 1 TABLET(40 MG) BY MOUTH EVERY DAY. STOP PRAVASTATIN 90 tablet 2    ibuprofen-diphenhydramine cit 200-38 mg Tab Take 1  "tablet by mouth nightly as needed.      NIFEdipine (PROCARDIA-XL) 90 MG (OSM) 24 hr tablet TAKE 1 TABLET(90 MG) BY MOUTH EVERY DAY 90 tablet 1    omeprazole (PRILOSEC) 20 MG capsule Take 1 capsule (20 mg total) by mouth once daily. 30 capsule 0    chlorthalidone (HYGROTEN) 50 MG Tab Take 1 tablet (50 mg total) by mouth once daily. 90 tablet 1    lisinopriL (PRINIVIL,ZESTRIL) 40 MG tablet Take 1 tablet (40 mg total) by mouth once daily. 90 tablet 1    NIFEdipine (ADALAT CC) 30 MG TbSR Take 30 mg by mouth nightly.       No current facility-administered medications for this visit.         ROS  Review of Systems   Constitutional: Negative for appetite change, chills, fatigue and fever.   HENT: Negative for congestion, ear pain, postnasal drip, rhinorrhea, sneezing, sore throat and tinnitus.    Respiratory: Negative for cough and shortness of breath.    Cardiovascular: Negative for chest pain and palpitations.   Gastrointestinal: Negative for abdominal pain, constipation, diarrhea, nausea and vomiting.   Genitourinary: Negative for difficulty urinating, dysuria, frequency, hematuria and urgency.   Musculoskeletal: Negative for arthralgias.   Neurological: Negative for headaches.   Psychiatric/Behavioral: Negative for sleep disturbance.           Physical Exam  Vitals:    07/27/22 0838 07/27/22 0900   BP: (!) 140/100 120/88   BP Location: Right arm    Patient Position: Sitting    BP Method: Large (Manual)    Pulse: 99    Temp: 99.4 °F (37.4 °C)    TempSrc: Oral    SpO2: 98%    Weight: 114 kg (251 lb 7 oz)    Height: 5' 7" (1.702 m)     Body mass index is 39.38 kg/m².  Weight: 114 kg (251 lb 7 oz)   Height: 5' 7" (170.2 cm)   Physical Exam  Constitutional:       General: He is not in acute distress.     Appearance: Normal appearance. He is obese.   HENT:      Head: Normocephalic and atraumatic.      Right Ear: External ear normal.      Left Ear: External ear normal.      Nose: Nose normal.   Eyes:      Pupils: Pupils " are equal, round, and reactive to light.   Cardiovascular:      Rate and Rhythm: Normal rate and regular rhythm.   Pulmonary:      Effort: Pulmonary effort is normal. No respiratory distress.      Breath sounds: Normal breath sounds. No wheezing.   Abdominal:      General: Bowel sounds are normal.      Palpations: Abdomen is soft.   Musculoskeletal:      Cervical back: Neck supple.   Lymphadenopathy:      Cervical: No cervical adenopathy.   Skin:     General: Skin is warm and dry.      Capillary Refill: Capillary refill takes less than 2 seconds.   Neurological:      General: No focal deficit present.      Mental Status: He is alert and oriented to person, place, and time. Mental status is at baseline.   Psychiatric:         Mood and Affect: Mood normal.           Health Maintenance       Date Due Completion Date    TETANUS VACCINE Never done ---    COVID-19 Vaccine (3 - Booster for Pfizer series) 10/17/2021 5/17/2021    Influenza Vaccine (1) 09/01/2022 10/28/2020    Lipid Panel 12/07/2026 12/7/2021            Assessment & Plan  Problem List Items Addressed This Visit        Cardiac/Vascular    Hyperlipidemia; 11/23/20 changed prava to atorva (Chronic)  -Continue Lipitor    Essential hypertension - Primary (Chronic)  -Continue all medications as prescribed   - Resume Nightly Procardia   - Ok for adjunct Hypertensive medication if B/P elevated at 2 week nurse visit with resuming nightly porcardia  - We discussed DASH dieting and salt substitutes  - Labs for 5 month recall for Routine Physical exam    Relevant Medications    lisinopriL (PRINIVIL,ZESTRIL) 40 MG tablet    chlorthalidone (HYGROTEN) 50 MG Tab    Other Relevant Orders    CBC Auto Differential    Comprehensive Metabolic Panel    Lipid Panel    Hemoglobin A1C    TSH       Endocrine    Severe obesity (BMI 35.0-39.9) with comorbidity (Chronic)  -Diet and exercise discussed with patient.             Health Maintenance reviewed: Deferred per  patient    Follow-up: 5 months or sooner if needed

## 2022-08-10 ENCOUNTER — TELEPHONE (OUTPATIENT)
Dept: FAMILY MEDICINE | Facility: CLINIC | Age: 42
End: 2022-08-10
Payer: MEDICAID

## 2022-08-10 NOTE — TELEPHONE ENCOUNTER
Called patient to re-schedule a missed Nurse B/P check appointment.  No answer.  Left voice message on an unidentified recorder requesting a call back.

## 2022-08-16 ENCOUNTER — CLINICAL SUPPORT (OUTPATIENT)
Dept: FAMILY MEDICINE | Facility: CLINIC | Age: 42
End: 2022-08-16
Payer: MEDICAID

## 2022-08-16 VITALS — DIASTOLIC BLOOD PRESSURE: 86 MMHG | SYSTOLIC BLOOD PRESSURE: 126 MMHG | OXYGEN SATURATION: 98 % | HEART RATE: 84 BPM

## 2022-08-16 DIAGNOSIS — I10 ESSENTIAL HYPERTENSION: Primary | ICD-10-CM

## 2022-08-16 PROCEDURE — 99999 PR PBB SHADOW E&M-EST. PATIENT-LVL II: ICD-10-PCS | Mod: PBBFAC,,,

## 2022-08-16 PROCEDURE — 99212 OFFICE O/P EST SF 10 MIN: CPT | Mod: PBBFAC,PO

## 2022-08-16 PROCEDURE — 99999 PR PBB SHADOW E&M-EST. PATIENT-LVL II: CPT | Mod: PBBFAC,,,

## 2022-08-16 NOTE — PROGRESS NOTES
Romain French 42 y.o. male is here today for Blood Pressure check.   History of HTN yes.    Review of patient's allergies indicates:  No Known Allergies  Creatinine   Date Value Ref Range Status   12/07/2021 1.1 0.5 - 1.4 mg/dL Final     Sodium   Date Value Ref Range Status   12/07/2021 141 136 - 145 mmol/L Final     Potassium   Date Value Ref Range Status   12/07/2021 3.6 3.5 - 5.1 mmol/L Final   ]  Patient verifies taking blood pressure medications on a regular basis at the same time of the day.     Current Outpatient Medications:     atorvastatin (LIPITOR) 40 MG tablet, TAKE 1 TABLET(40 MG) BY MOUTH EVERY DAY. STOP PRAVASTATIN, Disp: 90 tablet, Rfl: 2    chlorthalidone (HYGROTEN) 50 MG Tab, Take 1 tablet (50 mg total) by mouth once daily., Disp: 90 tablet, Rfl: 1    ibuprofen-diphenhydramine cit 200-38 mg Tab, Take 1 tablet by mouth nightly as needed., Disp: , Rfl:     lisinopriL (PRINIVIL,ZESTRIL) 40 MG tablet, Take 1 tablet (40 mg total) by mouth once daily., Disp: 90 tablet, Rfl: 1    NIFEdipine (ADALAT CC) 30 MG TbSR, Take 30 mg by mouth nightly., Disp: , Rfl:     NIFEdipine (PROCARDIA-XL) 90 MG (OSM) 24 hr tablet, TAKE 1 TABLET(90 MG) BY MOUTH EVERY DAY, Disp: 90 tablet, Rfl: 1    omeprazole (PRILOSEC) 20 MG capsule, Take 1 capsule (20 mg total) by mouth once daily., Disp: 30 capsule, Rfl: 0  Does patient have record of home blood pressure readings no.   Last dose of blood pressure medication was taken at 7am  Patient is asymptomatic.   Complains of none    Vitals:    08/16/22 0911   BP: 126/86   BP Location: Left arm   Patient Position: Sitting   BP Method: Large (Manual)   Pulse: 84   SpO2: 98%           informed of nurse visit.

## 2022-10-06 ENCOUNTER — PATIENT MESSAGE (OUTPATIENT)
Dept: FAMILY MEDICINE | Facility: CLINIC | Age: 42
End: 2022-10-06
Payer: MEDICAID

## 2022-12-13 DIAGNOSIS — I10 ESSENTIAL HYPERTENSION: Chronic | ICD-10-CM

## 2022-12-13 DIAGNOSIS — E78.5 HYPERLIPIDEMIA, UNSPECIFIED HYPERLIPIDEMIA TYPE: ICD-10-CM

## 2022-12-13 RX ORDER — NIFEDIPINE 30 MG/1
30 TABLET, FILM COATED, EXTENDED RELEASE ORAL NIGHTLY
Qty: 30 TABLET | Refills: 2 | Status: SHIPPED | OUTPATIENT
Start: 2022-12-13 | End: 2023-03-06

## 2022-12-13 RX ORDER — CHLORTHALIDONE 50 MG/1
50 TABLET ORAL DAILY
Qty: 90 TABLET | Refills: 1 | Status: SHIPPED | OUTPATIENT
Start: 2022-12-13 | End: 2023-03-08 | Stop reason: SDUPTHER

## 2022-12-13 RX ORDER — ATORVASTATIN CALCIUM 40 MG/1
TABLET, FILM COATED ORAL
Qty: 90 TABLET | Refills: 2 | Status: SHIPPED | OUTPATIENT
Start: 2022-12-13 | End: 2023-03-08 | Stop reason: SDUPTHER

## 2022-12-13 RX ORDER — NIFEDIPINE 90 MG/1
90 TABLET, EXTENDED RELEASE ORAL DAILY
Qty: 90 TABLET | Refills: 1 | Status: SHIPPED | OUTPATIENT
Start: 2022-12-13 | End: 2023-04-04 | Stop reason: SDUPTHER

## 2022-12-13 RX ORDER — LISINOPRIL 40 MG/1
40 TABLET ORAL DAILY
Qty: 90 TABLET | Refills: 1 | Status: SHIPPED | OUTPATIENT
Start: 2022-12-13 | End: 2023-12-18 | Stop reason: SDUPTHER

## 2022-12-13 NOTE — TELEPHONE ENCOUNTER
Care Due:                  Date            Visit Type   Department     Provider  --------------------------------------------------------------------------------                                [place]      [facility]                              FOLLOWUP/OF  MED/ INTERNAL  Last Visit: 12-      FICE VISIT   MED/ PEDS      Pricilla Ferrara                               -         [facility]                              PRIMARY      MEDICINE/  Next Visit: 12-      CARE ([facility])   INTERNAL MED   Pricilla Murphy  Test          Frequency    Reason                     Performed    Due Date  --------------------------------------------------------------------------------    CMP.........  12 months..  atorvastatin.............  12- 12-    Lipid Panel.  12 months..  atorvastatin.............  12- 12-    [employer] Embedded Care Gaps. Reference number: [id number]. 12/13/2022   9:21:10 AM CST

## 2023-03-05 DIAGNOSIS — I10 ESSENTIAL HYPERTENSION: Primary | ICD-10-CM

## 2023-03-05 NOTE — TELEPHONE ENCOUNTER
Care Due:                  Date            Visit Type   Department     Provider  --------------------------------------------------------------------------------                                [place]      [facility]                              FOLLOWUP/OF  MED/ INTERNAL  Last Visit: 12-      FICE VISIT   MED/ PEDS      Pricilla Ferrara                               -         [facility]                              PRIMARY      MEDICINE/  Next Visit: 03-      CARE ([facility])   INTERNAL MED   Pricilla Murphy  Test          Frequency    Reason                     Performed    Due Date  --------------------------------------------------------------------------------    CMP.........  12 months..  atorvastatin,              12- 12-                             chlorthalidone,                             lisinopriL...............    Lipid Panel.  12 months..  atorvastatin.............  12- 12-    [facility] Embedded Care Gaps. Reference number: [id number]. 3/05/2023   5:46:24 AM CST

## 2023-03-06 RX ORDER — NIFEDIPINE 30 MG/1
TABLET, FILM COATED, EXTENDED RELEASE ORAL
Qty: 30 TABLET | Refills: 0 | Status: SHIPPED | OUTPATIENT
Start: 2023-03-06 | End: 2023-04-04

## 2023-03-08 ENCOUNTER — OFFICE VISIT (OUTPATIENT)
Dept: FAMILY MEDICINE | Facility: CLINIC | Age: 43
End: 2023-03-08
Payer: MEDICAID

## 2023-03-08 VITALS
SYSTOLIC BLOOD PRESSURE: 140 MMHG | RESPIRATION RATE: 16 BRPM | HEART RATE: 97 BPM | DIASTOLIC BLOOD PRESSURE: 92 MMHG | HEIGHT: 67 IN | OXYGEN SATURATION: 97 % | TEMPERATURE: 98 F | WEIGHT: 248.44 LBS | BODY MASS INDEX: 38.99 KG/M2

## 2023-03-08 DIAGNOSIS — E66.01 SEVERE OBESITY (BMI 35.0-39.9) WITH COMORBIDITY: Chronic | ICD-10-CM

## 2023-03-08 DIAGNOSIS — Z00.00 ROUTINE GENERAL MEDICAL EXAMINATION AT A HEALTH CARE FACILITY: Primary | ICD-10-CM

## 2023-03-08 DIAGNOSIS — E55.9 VITAMIN D DEFICIENCY: ICD-10-CM

## 2023-03-08 DIAGNOSIS — F12.90 MARIJUANA USE: ICD-10-CM

## 2023-03-08 DIAGNOSIS — E78.5 HYPERLIPIDEMIA, UNSPECIFIED HYPERLIPIDEMIA TYPE: ICD-10-CM

## 2023-03-08 DIAGNOSIS — R06.83 SNORING: ICD-10-CM

## 2023-03-08 DIAGNOSIS — I10 ESSENTIAL HYPERTENSION: Chronic | ICD-10-CM

## 2023-03-08 PROCEDURE — 3080F PR MOST RECENT DIASTOLIC BLOOD PRESSURE >= 90 MM HG: ICD-10-PCS | Mod: CPTII,,, | Performed by: FAMILY MEDICINE

## 2023-03-08 PROCEDURE — 99215 OFFICE O/P EST HI 40 MIN: CPT | Mod: PBBFAC,PN | Performed by: FAMILY MEDICINE

## 2023-03-08 PROCEDURE — 99999 PR PBB SHADOW E&M-EST. PATIENT-LVL V: ICD-10-PCS | Mod: PBBFAC,,, | Performed by: FAMILY MEDICINE

## 2023-03-08 PROCEDURE — 1159F MED LIST DOCD IN RCRD: CPT | Mod: CPTII,,, | Performed by: FAMILY MEDICINE

## 2023-03-08 PROCEDURE — 3077F PR MOST RECENT SYSTOLIC BLOOD PRESSURE >= 140 MM HG: ICD-10-PCS | Mod: CPTII,,, | Performed by: FAMILY MEDICINE

## 2023-03-08 PROCEDURE — 3077F SYST BP >= 140 MM HG: CPT | Mod: CPTII,,, | Performed by: FAMILY MEDICINE

## 2023-03-08 PROCEDURE — 3008F BODY MASS INDEX DOCD: CPT | Mod: CPTII,,, | Performed by: FAMILY MEDICINE

## 2023-03-08 PROCEDURE — 99396 PREV VISIT EST AGE 40-64: CPT | Mod: S$PBB,,, | Performed by: FAMILY MEDICINE

## 2023-03-08 PROCEDURE — 3080F DIAST BP >= 90 MM HG: CPT | Mod: CPTII,,, | Performed by: FAMILY MEDICINE

## 2023-03-08 PROCEDURE — 1159F PR MEDICATION LIST DOCUMENTED IN MEDICAL RECORD: ICD-10-PCS | Mod: CPTII,,, | Performed by: FAMILY MEDICINE

## 2023-03-08 PROCEDURE — 3008F PR BODY MASS INDEX (BMI) DOCUMENTED: ICD-10-PCS | Mod: CPTII,,, | Performed by: FAMILY MEDICINE

## 2023-03-08 PROCEDURE — 99396 PR PREVENTIVE VISIT,EST,40-64: ICD-10-PCS | Mod: S$PBB,,, | Performed by: FAMILY MEDICINE

## 2023-03-08 PROCEDURE — 99999 PR PBB SHADOW E&M-EST. PATIENT-LVL V: CPT | Mod: PBBFAC,,, | Performed by: FAMILY MEDICINE

## 2023-03-08 RX ORDER — ATORVASTATIN CALCIUM 40 MG/1
TABLET, FILM COATED ORAL
Qty: 90 TABLET | Refills: 2 | Status: SHIPPED | OUTPATIENT
Start: 2023-03-08 | End: 2023-11-29

## 2023-03-08 RX ORDER — CHLORTHALIDONE 50 MG/1
50 TABLET ORAL DAILY
Qty: 90 TABLET | Refills: 1 | Status: SHIPPED | OUTPATIENT
Start: 2023-03-08

## 2023-03-08 NOTE — PROGRESS NOTES
Chief Complaint   Patient presents with    Annual Exam       HPI  Romain French is a 42 y.o. male with multiple medical diagnoses as listed in the medical history and problem list that presents for annual exam     HTN- he is taking his medication, he has just started exercising and eating better last week; no longer apart of digital medicine  Snoring- sleeps 4 hours a night and snores loudly  THC use- he has panic attacks and manages his anxiety with marijuana  Weight gain- would like a medication to help with weight loss      ALLERGIES AND MEDICATIONS: updated and reviewed.  Review of patient's allergies indicates:  No Known Allergies  Medication List with Changes/Refills   Current Medications    IBUPROFEN-DIPHENHYDRAMINE -38 MG TAB    Take 1 tablet by mouth nightly as needed.    LISINOPRIL (PRINIVIL,ZESTRIL) 40 MG TABLET    Take 1 tablet (40 mg total) by mouth once daily.    NIFEDIPINE (ADALAT CC) 30 MG TBSR    TAKE 1 TABLET(30 MG) BY MOUTH EVERY NIGHT    NIFEDIPINE (PROCARDIA-XL) 90 MG (OSM) 24 HR TABLET    Take 1 tablet (90 mg total) by mouth once daily.   Changed and/or Refilled Medications    Modified Medication Previous Medication    ATORVASTATIN (LIPITOR) 40 MG TABLET atorvastatin (LIPITOR) 40 MG tablet       Take one tablet by mouth daily    Take one tablet by mouth daily    CHLORTHALIDONE (HYGROTEN) 50 MG TAB chlorthalidone (HYGROTEN) 50 MG Tab       Take 1 tablet (50 mg total) by mouth once daily.    Take 1 tablet (50 mg total) by mouth once daily.   Discontinued Medications    FLUTICASONE PROPIONATE (FLONASE) 50 MCG/ACTUATION NASAL SPRAY    SHAKE LIQUID AND USE 1 SPRAY(50 MCG) IN EACH NOSTRIL EVERY DAY    OMEPRAZOLE (PRILOSEC) 20 MG CAPSULE    Take 1 capsule (20 mg total) by mouth once daily.       ROS  Review of Systems   Constitutional:  Negative for chills, fatigue, fever and unexpected weight change.   HENT:  Negative for ear pain, postnasal drip, rhinorrhea, sinus pressure and sore throat.   "  Eyes:  Negative for photophobia and visual disturbance.   Respiratory:  Negative for apnea, cough, chest tightness, shortness of breath and wheezing.    Cardiovascular:  Negative for chest pain and palpitations.   Gastrointestinal:  Negative for abdominal pain, blood in stool, constipation, diarrhea, nausea and vomiting.   Genitourinary:  Negative for difficulty urinating.   Musculoskeletal:  Negative for arthralgias and joint swelling.   Skin:  Negative for rash.   Neurological:  Negative for facial asymmetry, speech difficulty, weakness, numbness and headaches.   Psychiatric/Behavioral:  Positive for dysphoric mood.      Physical Exam  Vitals:    03/08/23 0813 03/08/23 0922   BP: (!) 140/96 (!) 140/92   BP Location: Right arm    Patient Position: Sitting    BP Method: Large (Manual)    Pulse: 97    Resp: 16    Temp: 98 °F (36.7 °C)    TempSrc: Oral    SpO2: 97%    Weight: 112.7 kg (248 lb 7.3 oz)    Height: 5' 7" (1.702 m)     Body mass index is 38.91 kg/m².  Weight: 112.7 kg (248 lb 7.3 oz)   Height: 5' 7" (170.2 cm)     Physical Exam  Vitals and nursing note reviewed.   Constitutional:       Appearance: He is well-developed.   HENT:      Head: Normocephalic and atraumatic.   Eyes:      Pupils: Pupils are equal, round, and reactive to light.   Cardiovascular:      Rate and Rhythm: Normal rate and regular rhythm.      Heart sounds: No murmur heard.  Pulmonary:      Breath sounds: Normal breath sounds. No wheezing or rales.   Abdominal:      General: Bowel sounds are normal. There is no distension.      Palpations: Abdomen is soft. There is no mass.      Tenderness: There is no abdominal tenderness. There is no guarding or rebound.      Hernia: No hernia is present.   Skin:     Findings: No rash.   Neurological:      Mental Status: He is alert. Mental status is at baseline.       Health Maintenance         Date Due Completion Date    TETANUS VACCINE Never done ---    COVID-19 Vaccine (3 - Booster for Pfizer " series) 07/12/2021 5/17/2021    Influenza Vaccine (1) 09/01/2022 10/28/2020    Hemoglobin A1c (Diabetic Prevention Screening) 12/07/2024 12/7/2021    Lipid Panel 12/07/2026 12/7/2021            Health maintenance reviewed and addressed as ordered      ASSESSMENT/PLAN       1. Routine general medical examination at a health care facility  discussed healthy lifestyle modification with exercise and healthy diet, reviewed age appropriate screening and healthy maintenance    discussed healthy lifestyle modification with exercise and healthy diet, reviewed age appropriate screening and healthy maintenance    - CBC Auto Differential; Future  - Comprehensive Metabolic Panel; Future  - Lipid Panel; Future  - Hemoglobin A1C; Future  - TSH; Future    2. Essential hypertension  Continue lifestyle changes  Suspect untreated DIRK  - chlorthalidone (HYGROTEN) 50 MG Tab; Take 1 tablet (50 mg total) by mouth once daily.  Dispense: 90 tablet; Refill: 1    3. Vitamin D deficiency    4. Severe obesity (BMI 35.0-39.9) with comorbidity  Considering topamax for him but he will need to avoid THC  Will f/u for weight  - Ambulatory referral/consult to Bariatric Medicine; Future    5. Snoring  Needs DIRK eval  - Ambulatory referral/consult to Sleep Disorders; Future    6. Hyperlipidemia, unspecified hyperlipidemia type  - atorvastatin (LIPITOR) 40 MG tablet; Take one tablet by mouth daily  Dispense: 90 tablet; Refill: 2    7. Marijuana use  We discussed this may affect medications with interactions and can cause respiratory disease        Pricilla Ferrara MD  03/08/2023 8:32 AM        Follow up in about 1 month (around 4/8/2023) for management of chronic conditions.    Orders Placed This Encounter   Procedures    CBC Auto Differential    Comprehensive Metabolic Panel    Lipid Panel    Hemoglobin A1C    TSH    Ambulatory referral/consult to Bariatric Medicine    Ambulatory referral/consult to Sleep Disorders

## 2023-03-09 ENCOUNTER — LAB VISIT (OUTPATIENT)
Dept: LAB | Facility: HOSPITAL | Age: 43
End: 2023-03-09
Payer: MEDICAID

## 2023-03-09 DIAGNOSIS — I10 ESSENTIAL HYPERTENSION: Chronic | ICD-10-CM

## 2023-03-09 LAB
ALBUMIN SERPL BCP-MCNC: 4.2 G/DL (ref 3.5–5.2)
ALP SERPL-CCNC: 75 U/L (ref 55–135)
ALT SERPL W/O P-5'-P-CCNC: 38 U/L (ref 10–44)
ANION GAP SERPL CALC-SCNC: 10 MMOL/L (ref 8–16)
AST SERPL-CCNC: 31 U/L (ref 10–40)
BASOPHILS # BLD AUTO: 0.07 K/UL (ref 0–0.2)
BASOPHILS NFR BLD: 1.2 % (ref 0–1.9)
BILIRUB SERPL-MCNC: 1 MG/DL (ref 0.1–1)
BUN SERPL-MCNC: 10 MG/DL (ref 6–20)
CALCIUM SERPL-MCNC: 9.7 MG/DL (ref 8.7–10.5)
CHLORIDE SERPL-SCNC: 100 MMOL/L (ref 95–110)
CHOLEST SERPL-MCNC: 205 MG/DL (ref 120–199)
CHOLEST/HDLC SERPL: 5.4 {RATIO} (ref 2–5)
CO2 SERPL-SCNC: 31 MMOL/L (ref 23–29)
CREAT SERPL-MCNC: 1.1 MG/DL (ref 0.5–1.4)
DIFFERENTIAL METHOD: ABNORMAL
EOSINOPHIL # BLD AUTO: 0.3 K/UL (ref 0–0.5)
EOSINOPHIL NFR BLD: 4.1 % (ref 0–8)
ERYTHROCYTE [DISTWIDTH] IN BLOOD BY AUTOMATED COUNT: 12.5 % (ref 11.5–14.5)
EST. GFR  (NO RACE VARIABLE): >60 ML/MIN/1.73 M^2
ESTIMATED AVG GLUCOSE: 94 MG/DL (ref 68–131)
GLUCOSE SERPL-MCNC: 100 MG/DL (ref 70–110)
HBA1C MFR BLD: 4.9 % (ref 4–5.6)
HCT VFR BLD AUTO: 46.5 % (ref 40–54)
HDLC SERPL-MCNC: 38 MG/DL (ref 40–75)
HDLC SERPL: 18.5 % (ref 20–50)
HGB BLD-MCNC: 16.6 G/DL (ref 14–18)
IMM GRANULOCYTES # BLD AUTO: 0.01 K/UL (ref 0–0.04)
IMM GRANULOCYTES NFR BLD AUTO: 0.2 % (ref 0–0.5)
LDLC SERPL CALC-MCNC: 126.2 MG/DL (ref 63–159)
LYMPHOCYTES # BLD AUTO: 2.2 K/UL (ref 1–4.8)
LYMPHOCYTES NFR BLD: 36 % (ref 18–48)
MCH RBC QN AUTO: 31.7 PG (ref 27–31)
MCHC RBC AUTO-ENTMCNC: 35.7 G/DL (ref 32–36)
MCV RBC AUTO: 89 FL (ref 82–98)
MONOCYTES # BLD AUTO: 0.6 K/UL (ref 0.3–1)
MONOCYTES NFR BLD: 9.6 % (ref 4–15)
NEUTROPHILS # BLD AUTO: 3 K/UL (ref 1.8–7.7)
NEUTROPHILS NFR BLD: 48.9 % (ref 38–73)
NONHDLC SERPL-MCNC: 167 MG/DL
NRBC BLD-RTO: 0 /100 WBC
PLATELET # BLD AUTO: 264 K/UL (ref 150–450)
PMV BLD AUTO: 10 FL (ref 9.2–12.9)
POTASSIUM SERPL-SCNC: 3.3 MMOL/L (ref 3.5–5.1)
PROT SERPL-MCNC: 8.1 G/DL (ref 6–8.4)
RBC # BLD AUTO: 5.23 M/UL (ref 4.6–6.2)
SODIUM SERPL-SCNC: 141 MMOL/L (ref 136–145)
TRIGL SERPL-MCNC: 204 MG/DL (ref 30–150)
TSH SERPL DL<=0.005 MIU/L-ACNC: 1.12 UIU/ML (ref 0.4–4)
WBC # BLD AUTO: 6.06 K/UL (ref 3.9–12.7)

## 2023-03-09 PROCEDURE — 83036 HEMOGLOBIN GLYCOSYLATED A1C: CPT | Performed by: NURSE PRACTITIONER

## 2023-03-09 PROCEDURE — 36415 COLL VENOUS BLD VENIPUNCTURE: CPT | Mod: PN | Performed by: NURSE PRACTITIONER

## 2023-03-09 PROCEDURE — 84443 ASSAY THYROID STIM HORMONE: CPT | Performed by: NURSE PRACTITIONER

## 2023-03-09 PROCEDURE — 80061 LIPID PANEL: CPT | Performed by: NURSE PRACTITIONER

## 2023-03-09 PROCEDURE — 80053 COMPREHEN METABOLIC PANEL: CPT | Performed by: NURSE PRACTITIONER

## 2023-03-09 PROCEDURE — 85025 COMPLETE CBC W/AUTO DIFF WBC: CPT | Performed by: NURSE PRACTITIONER

## 2023-03-10 ENCOUNTER — TELEPHONE (OUTPATIENT)
Dept: FAMILY MEDICINE | Facility: CLINIC | Age: 43
End: 2023-03-10
Payer: MEDICAID

## 2023-03-17 ENCOUNTER — TELEPHONE (OUTPATIENT)
Dept: ADMINISTRATIVE | Facility: HOSPITAL | Age: 43
End: 2023-03-17
Payer: MEDICAID

## 2023-03-23 ENCOUNTER — PATIENT MESSAGE (OUTPATIENT)
Dept: ADMINISTRATIVE | Facility: HOSPITAL | Age: 43
End: 2023-03-23
Payer: MEDICAID

## 2023-04-04 ENCOUNTER — PATIENT MESSAGE (OUTPATIENT)
Dept: ADMINISTRATIVE | Facility: HOSPITAL | Age: 43
End: 2023-04-04
Payer: MEDICAID

## 2023-04-04 DIAGNOSIS — Z12.11 SCREENING FOR COLON CANCER: ICD-10-CM

## 2023-04-04 DIAGNOSIS — I10 ESSENTIAL HYPERTENSION: ICD-10-CM

## 2023-04-04 RX ORDER — NIFEDIPINE 30 MG/1
TABLET, FILM COATED, EXTENDED RELEASE ORAL
Qty: 90 TABLET | OUTPATIENT
Start: 2023-04-04

## 2023-04-04 RX ORDER — NIFEDIPINE 90 MG/1
90 TABLET, EXTENDED RELEASE ORAL DAILY
Qty: 90 TABLET | Refills: 1 | Status: SHIPPED | OUTPATIENT
Start: 2023-04-04

## 2023-04-04 NOTE — TELEPHONE ENCOUNTER
Refill Routing Note   Medication(s) are not appropriate for processing by [facility] for the following reason(s):      Required vitals abnormal: 140/92    [facility] action(s):  Defer            Appointments  past 12m or future 3m with PCP    Date Provider   Last Visit   3/8/2023 Pricilla Ferrara MD   Next Visit   5/3/2023 Pricilla Ferrara MD   ED visits in past 90 days: 0        Note composed:12:29 PM 04/04/2023

## 2023-04-04 NOTE — TELEPHONE ENCOUNTER
No new care gaps identified.  [facility] Embedded Care Gaps. Reference number: [id number]. 4/04/2023   5:43:23 AM D[place]ONT

## 2023-04-11 RX ORDER — FLUTICASONE PROPIONATE 50 MCG
1 SPRAY, SUSPENSION (ML) NASAL
COMMUNITY
Start: 2023-03-22

## 2023-04-12 ENCOUNTER — PATIENT MESSAGE (OUTPATIENT)
Dept: ADMINISTRATIVE | Facility: HOSPITAL | Age: 43
End: 2023-04-12
Payer: MEDICAID

## 2023-05-19 ENCOUNTER — PATIENT MESSAGE (OUTPATIENT)
Dept: FAMILY MEDICINE | Facility: CLINIC | Age: 43
End: 2023-05-19
Payer: MEDICAID

## 2023-05-19 ENCOUNTER — PATIENT MESSAGE (OUTPATIENT)
Dept: OTHER | Facility: OTHER | Age: 43
End: 2023-05-19
Payer: MEDICAID

## 2023-05-19 DIAGNOSIS — E66.9 CLASS 2 OBESITY IN ADULT, UNSPECIFIED BMI, UNSPECIFIED OBESITY TYPE, UNSPECIFIED WHETHER SERIOUS COMORBIDITY PRESENT: Primary | ICD-10-CM

## 2023-06-01 ENCOUNTER — PATIENT MESSAGE (OUTPATIENT)
Dept: ADMINISTRATIVE | Facility: HOSPITAL | Age: 43
End: 2023-06-01
Payer: MEDICAID

## 2023-07-20 ENCOUNTER — TELEPHONE (OUTPATIENT)
Dept: BARIATRICS | Facility: CLINIC | Age: 43
End: 2023-07-20
Payer: MEDICAID

## 2023-11-06 ENCOUNTER — PATIENT MESSAGE (OUTPATIENT)
Dept: ADMINISTRATIVE | Facility: HOSPITAL | Age: 43
End: 2023-11-06
Payer: MEDICAID

## 2023-11-29 DIAGNOSIS — E78.5 HYPERLIPIDEMIA, UNSPECIFIED HYPERLIPIDEMIA TYPE: ICD-10-CM

## 2023-11-29 RX ORDER — ATORVASTATIN CALCIUM 40 MG/1
TABLET, FILM COATED ORAL
Qty: 90 TABLET | Refills: 1 | Status: SHIPPED | OUTPATIENT
Start: 2023-11-29

## 2023-11-29 NOTE — TELEPHONE ENCOUNTER
Refill Decision Note   Romain Madridin  is requesting a refill authorization.  Brief Assessment and Rationale for Refill:  Approve     Medication Therapy Plan:         Comments:     Note composed:7:18 AM 11/29/2023

## 2023-11-29 NOTE — TELEPHONE ENCOUNTER
No care due was identified.  [facility] Embedded Care Due Messages. Reference number: [id number].   11/29/2023 5:48:38 AM CST

## 2023-12-18 DIAGNOSIS — I10 ESSENTIAL HYPERTENSION: Chronic | ICD-10-CM

## 2023-12-18 RX ORDER — LISINOPRIL 40 MG/1
40 TABLET ORAL DAILY
Qty: 90 TABLET | Refills: 0 | Status: SHIPPED | OUTPATIENT
Start: 2023-12-18

## 2024-01-06 ENCOUNTER — PATIENT MESSAGE (OUTPATIENT)
Dept: URGENT CARE | Facility: CLINIC | Age: 44
End: 2024-01-06

## 2024-03-01 ENCOUNTER — PATIENT MESSAGE (OUTPATIENT)
Dept: ADMINISTRATIVE | Facility: HOSPITAL | Age: 44
End: 2024-03-01
Payer: MEDICAID

## 2024-06-03 ENCOUNTER — PATIENT MESSAGE (OUTPATIENT)
Dept: PSYCHIATRY | Facility: CLINIC | Age: 44
End: 2024-06-03
Payer: MEDICAID

## 2024-06-03 ENCOUNTER — OFFICE VISIT (OUTPATIENT)
Dept: FAMILY MEDICINE | Facility: CLINIC | Age: 44
End: 2024-06-03
Payer: MEDICAID

## 2024-06-03 VITALS
WEIGHT: 254 LBS | HEIGHT: 67 IN | BODY MASS INDEX: 39.87 KG/M2 | TEMPERATURE: 99 F | DIASTOLIC BLOOD PRESSURE: 130 MMHG | SYSTOLIC BLOOD PRESSURE: 178 MMHG | OXYGEN SATURATION: 96 % | HEART RATE: 105 BPM

## 2024-06-03 DIAGNOSIS — F12.90 MARIJUANA USE: ICD-10-CM

## 2024-06-03 DIAGNOSIS — I10 ESSENTIAL HYPERTENSION: Chronic | ICD-10-CM

## 2024-06-03 DIAGNOSIS — F32.2 CURRENT SEVERE EPISODE OF MAJOR DEPRESSIVE DISORDER WITHOUT PSYCHOTIC FEATURES WITHOUT PRIOR EPISODE: Primary | ICD-10-CM

## 2024-06-03 PROCEDURE — 3077F SYST BP >= 140 MM HG: CPT | Mod: CPTII,,, | Performed by: FAMILY MEDICINE

## 2024-06-03 PROCEDURE — 99214 OFFICE O/P EST MOD 30 MIN: CPT | Mod: S$PBB,,, | Performed by: FAMILY MEDICINE

## 2024-06-03 PROCEDURE — 3008F BODY MASS INDEX DOCD: CPT | Mod: CPTII,,, | Performed by: FAMILY MEDICINE

## 2024-06-03 PROCEDURE — 99999 PR PBB SHADOW E&M-EST. PATIENT-LVL IV: CPT | Mod: PBBFAC,,, | Performed by: FAMILY MEDICINE

## 2024-06-03 PROCEDURE — 99214 OFFICE O/P EST MOD 30 MIN: CPT | Mod: PBBFAC,PN | Performed by: FAMILY MEDICINE

## 2024-06-03 PROCEDURE — 3080F DIAST BP >= 90 MM HG: CPT | Mod: CPTII,,, | Performed by: FAMILY MEDICINE

## 2024-06-03 PROCEDURE — 1159F MED LIST DOCD IN RCRD: CPT | Mod: CPTII,,, | Performed by: FAMILY MEDICINE

## 2024-06-03 RX ORDER — LISINOPRIL 40 MG/1
40 TABLET ORAL DAILY
Qty: 90 TABLET | Refills: 1 | Status: SHIPPED | OUTPATIENT
Start: 2024-06-03

## 2024-06-03 RX ORDER — CHLORTHALIDONE 50 MG/1
50 TABLET ORAL DAILY
Qty: 90 TABLET | Refills: 1 | Status: SHIPPED | OUTPATIENT
Start: 2024-06-03

## 2024-06-03 RX ORDER — BUPROPION HYDROCHLORIDE 150 MG/1
150 TABLET ORAL DAILY
Qty: 30 TABLET | Refills: 2 | Status: SHIPPED | OUTPATIENT
Start: 2024-06-03 | End: 2024-06-12

## 2024-06-03 RX ORDER — NIFEDIPINE 90 MG/1
90 TABLET, EXTENDED RELEASE ORAL DAILY
Qty: 90 TABLET | Refills: 1 | Status: SHIPPED | OUTPATIENT
Start: 2024-06-03

## 2024-06-03 NOTE — PROGRESS NOTES
Chief Complaint   Patient presents with    Depression     Anger, feeling depressed, concerns of referral  HTN       HPI  Romain French is a 44 y.o. male with multiple medical diagnoses as listed in the medical history and problem list that presents for follow-up for depression and HTN    He has been severely depressed for some time now, has been having problems in his marriage and with his family  He does not feel like his family wants him around, and he is not actively trying to live, he is not taking his medicine, he has thoughts about death and dying, does not have a plan of how he would take his life but has been paying down debt and increasing his life insurance policy so that if he dies his family is cared for  He has had outbursts of anger that have affected his relationship with his son and his wife    He is open to seeing psychiatry and counseling      ALLERGIES AND MEDICATIONS: updated and reviewed.  Review of patient's allergies indicates:  No Known Allergies  Medication List with Changes/Refills   New Medications    BUPROPION (WELLBUTRIN XL) 150 MG TB24 TABLET    Take 1 tablet (150 mg total) by mouth once daily.   Current Medications    ATORVASTATIN (LIPITOR) 40 MG TABLET    TAKE 1 TABLET BY MOUTH DAILY    FLUTICASONE PROPIONATE (FLONASE) 50 MCG/ACTUATION NASAL SPRAY    1 spray by Each Nostril route.    IBUPROFEN-DIPHENHYDRAMINE -38 MG TAB    Take 1 tablet by mouth nightly as needed.   Changed and/or Refilled Medications    Modified Medication Previous Medication    CHLORTHALIDONE (HYGROTEN) 50 MG TAB chlorthalidone (HYGROTEN) 50 MG Tab       Take 1 tablet (50 mg total) by mouth once daily.    Take 1 tablet (50 mg total) by mouth once daily.    LISINOPRIL (PRINIVIL,ZESTRIL) 40 MG TABLET lisinopriL (PRINIVIL,ZESTRIL) 40 MG tablet       Take 1 tablet (40 mg total) by mouth once daily.    Take 1 tablet (40 mg total) by mouth once daily.    NIFEDIPINE (PROCARDIA-XL) 90 MG (OSM) 24 HR TABLET NIFEdipine  "(PROCARDIA-XL) 90 MG (OSM) 24 hr tablet       Take 1 tablet (90 mg total) by mouth once daily.    Take 1 tablet (90 mg total) by mouth once daily.       ROS  Review of Systems   Constitutional:  Negative for chills, fatigue, fever and unexpected weight change.   HENT:  Negative for ear pain, postnasal drip, rhinorrhea, sinus pressure and sore throat.    Eyes:  Negative for photophobia and visual disturbance.   Respiratory:  Negative for apnea, cough, chest tightness, shortness of breath and wheezing.    Cardiovascular:  Negative for chest pain and palpitations.   Gastrointestinal:  Negative for abdominal pain, blood in stool, constipation, diarrhea, nausea and vomiting.   Genitourinary:  Negative for difficulty urinating.   Musculoskeletal:  Negative for arthralgias and joint swelling.   Skin:  Negative for rash.   Neurological:  Negative for facial asymmetry, speech difficulty, weakness, numbness and headaches.   Psychiatric/Behavioral:  Positive for dysphoric mood.        Physical Exam  Vitals:    06/03/24 0815   BP: (!) 178/130   BP Location: Left arm   Patient Position: Sitting   BP Method: Large (Manual)   Pulse: 105   Temp: 98.7 °F (37.1 °C)   TempSrc: Oral   SpO2: 96%   Weight: 115.2 kg (253 lb 15.5 oz)   Height: 5' 7" (1.702 m)    Body mass index is 39.78 kg/m².  Weight: 115.2 kg (253 lb 15.5 oz)   Height: 5' 7" (170.2 cm)     Physical Exam  Vitals and nursing note reviewed.   Constitutional:       Appearance: He is well-developed.   HENT:      Head: Normocephalic and atraumatic.   Skin:     General: Skin is warm and dry.      Findings: No rash.   Neurological:      Mental Status: He is alert. Mental status is at baseline.   Psychiatric:         Behavior: Behavior normal.         Health Maintenance         Date Due Completion Date    TETANUS VACCINE Never done ---    Colorectal Cancer Screening Never done ---    COVID-19 Vaccine (3 - 2023-24 season) 09/01/2023 5/17/2021    Influenza Vaccine (Season Ended) " 09/01/2024 10/28/2020    Hemoglobin A1c (Diabetic Prevention Screening) 03/09/2026 3/9/2023    Lipid Panel 03/09/2028 3/9/2023            Health maintenance reviewed and addressed as ordered      ASSESSMENT/PLAN       1. Current severe episode of major depressive disorder without psychotic features without prior episode  Refer for therapy and psychiatry evaluation  F/u in 2 weeks  Begin wellbutrin, he has the suicide hotline or has been advised to call 911 should his depression worsen  - Ambulatory referral/consult to Behavioral Health; Future  - Ambulatory referral/consult to Behavioral Health; Future  - buPROPion (WELLBUTRIN XL) 150 MG TB24 tablet; Take 1 tablet (150 mg total) by mouth once daily.  Dispense: 30 tablet; Refill: 2    2. Essential hypertension  Resume medications  - lisinopriL (PRINIVIL,ZESTRIL) 40 MG tablet; Take 1 tablet (40 mg total) by mouth once daily.  Dispense: 90 tablet; Refill: 1  - NIFEdipine (PROCARDIA-XL) 90 MG (OSM) 24 hr tablet; Take 1 tablet (90 mg total) by mouth once daily.  Dispense: 90 tablet; Refill: 1  - chlorthalidone (HYGROTEN) 50 MG Tab; Take 1 tablet (50 mg total) by mouth once daily.  Dispense: 90 tablet; Refill: 1    3. Marijuana use  Advised him to stop using THC as it can interact with his current medications      Pricilla Ferrara MD  06/03/2024 8:20 AM        Follow up in about 2 weeks (around 6/17/2024).    Orders Placed This Encounter   Procedures    Ambulatory referral/consult to Behavioral Health    Ambulatory referral/consult to Behavioral Health

## 2024-06-04 ENCOUNTER — PATIENT MESSAGE (OUTPATIENT)
Dept: ADMINISTRATIVE | Facility: HOSPITAL | Age: 44
End: 2024-06-04
Payer: MEDICAID

## 2024-06-12 ENCOUNTER — OFFICE VISIT (OUTPATIENT)
Dept: PSYCHIATRY | Facility: CLINIC | Age: 44
End: 2024-06-12
Payer: MEDICAID

## 2024-06-12 DIAGNOSIS — R69 PSYCHIATRIC DIAGNOSIS DEFERRED: ICD-10-CM

## 2024-06-12 NOTE — PROGRESS NOTES
The patient location is: Home at address on record in [place]  The chief complaint leading to consultation is: Depression    Visit type: audiovisual    Face to Face time with patient: 50 min  90 minutes of total time spent on the encounter, which includes face to face time and non-face to face time preparing to see the patient (eg, review of tests), Obtaining and/or reviewing separately obtained history, Documenting clinical information in the electronic or other health record, Independently interpreting results (not separately reported) and communicating results to the patient/family/caregiver, or Care coordination (not separately reported).         Each patient to whom he or she provides medical services by telemedicine is:  (1) informed of the relationship between the physician and patient and the respective role of any other health care provider with respect to management of the patient; and (2) notified that he or she may decline to receive medical services by telemedicine and may withdraw from such care at any time.    Notes:      6/12/2024   PHQ-9 Depression Patient Health Questionnaire    Over the last two weeks how often have you been bothered by little interest or pleasure in doing things 0    Over the last two weeks how often have you been bothered by feeling down, depressed or hopeless 3    Over the last two weeks how often have you been bothered by trouble falling or staying asleep, or sleeping too much 3    Over the last two weeks how often have you been bothered by feeling tired or having little energy 2    Over the last two weeks how often have you been bothered by a poor appetite or overeating 3    Over the last two weeks how often have you been bothered by feeling bad about yourself - or that you are a failure or have let yourself or your family down 3    Over the last two weeks how often have you been bothered by trouble concentrating on things, such as reading the newspaper or watching  television 1    Over the last two weeks how often have you been bothered by moving or speaking so slowly that other people could have noticed. 2    Over the last two weeks how often have you been bothered by thoughts that you would be better off dead, or of hurting yourself 2    If you checked off any problems, how difficult have these problems made it for you to do your work, take care of things at home or get along with other people? Not difficult at all    PHQ-9 Score 19    MAGGIE-7    Was test performed? Yes    1. Feeling nervous, anxious, or on edge? Nearly everyday    2. Not being able to stop or control worrying? Nearly everyday    3. Worrying too much about different things? Nearly everyday    4. Trouble relaxing? Nearly everyday    5. Being so restless that it is hard to sit still? Nearly everyday    6. Becoming easily annoyed or irritable? Nearly everyday    7. Feeling afraid as if something awful might happen? More than half the days    8. If you checked off any problems, how difficult have these problems made it for you to do your work, take care of things at home, or get along with other people? Somewhat difficult    MAGGIE-7 Score 20    Number answered (out of first 7) 7    Interpretation Severe Anxiety          INITIAL VISIT: PSYCHIATRY  Outpatient Clinic    PRESENTATION:     Romain French is a 44 y.o. patient seen for an initial psychiatric evaluation.  A history of the presenting illness (HPI) was obtained, and a pertinent psychiatric and medical review of systems was performed.    Per Chart:  Hx of psych eval 3 years ago with similar complaints as current.    Per Patient:He   Pt presents for evaluation and treatment of depresion.  He was referred by his PCP.  He states that for the past 5 years or so, ever since discovering his wife speaking to another man, he has been feeling varying degrees of sadness daily.  States he also feels very irritable and angry at times.  Reports that the anger will precede the  "depression.  Endorses many sxs on PHQ-9, see results above.  He cites family struggles and his marriage as the main source of his distress.  He states that his wife has mentioned divorce a few times, and that feeling un liked by her makes him feel upset.  He has 3 children living with him and his wife, and when his 20 year old son isn't productive, pt says this makes him mad.  Pt reports that he does feel like he wouldn't mind if he were dead, and has stopped treating his HTN, saying that it would be OK if he were to have a heart attack and die.  He states multiple times that he does not want to end his life and does not feel he is in danger of attempting suicide.  Does not report access to guns.  Admits to watching dark web videos of people committing suicide, states that he compares his life to what he imagines theirs must be like so that he will see how much his life isn't that bad.  Reports watching these videos makes him not want to commit suicide.  He denies every having attempted suicide in the past.        [] Y  [x] N  sleep disturbance:  Globally: uses ibuprofen PM every night Positive for: regular use of sleep aides  [x] Y  [] N  appetite/weight change:  Globally: endorsed but not observed Positive for: weight gain (unintentional)  [] Y  [x] N  fatigue/anergia:   [] Y  [x] N  impairment in focus/concentration:    [x] Y  [] N  depression: Globally: observed, severe, fluctuating   [x] Y  [] N  anxiety/worry:   [] Y  [x] N  somatically preoccupied:   [x] Y  [] N  dysregulated mood/behavior:  Positive for: moodiness, mood lability, mood goes "up and down", irritability, impulsivity   [] Y  [x] N  manic symptomatology:   [] Y  [x] N  psychosis:     [] Y  [x] N  pain:   [] Y  [x] N  cardiac symptoms:   [] Y  [x] N  abnormal movements:         CURRENT PSYCHOTROPIC REGIMEN:  NONE- did not start bupropion as PCP rx'd        HISTORY:     I[]I Patient unable or " unwilling to provide any history.    I[x]I Y  I[]I N  I[]I U  Psychiatric Diagnoses/Symptomatology:   I[]I Y  I[x]I N  I[]I U  Hx of Psychiatric Hospitalization:   I[]I Y  I[x]I N  I[]I U  Hx of Outpatient Psychiatric Treatment (psychiatry/psychotherapy):   I[]I Y  I[x]I N  I[]I U  Psychotropic Trials:   I[]I Y  I[x]I N  I[]I U  Prior Suicide Attempts:   I[x]I Y  I[]I N  I[]I U  Hx of Suicidal Ideation:   I[]I Y  I[x]I N  I[]I U  Hx of Homicidal Ideation:   I[]I Y  I[x]I N  I[]I U  Hx of Self-Injurious Behavior (Non-Suicidal):   I[]I Y  I[x]I N  I[]I U  Hx of Violence:   I[]I Y  I[x]I N  I[]I U  Documented Hx of Malingering:   I[]I Y  I[x]I N  I[]I U  Hx of Detox:   I[]I Y  I[x]I N  I[]I U  Hx of Rehab:     I[]I Y  I[]I N  I[x]I U  I[]I Former  Nicotine Use:    I[]I Y  I[]I N  I[x]I U  I[]I Former  Alcohol Consumption:   I[]I Y  I[]I N  I[x]I U  I[]I Former  Alcohol Misuse/Abuse:   I[]I Y  I[]I N  I[x]I U  I[]I Former  Illicit Drug Use/Misuse/Abuse:   I[]I Y  I[]I N  I[x]I U  I[]I Former  Misuse/Abuse of Rx Medications:   I[]I Cannabis  I[]I Cocaine  I[]I Heroin  I[]I Meth  I[]I Opioids  I[]I Stimulants  I[]I Benzos  I[]I Other:       FAMILY HISTORY:  I[x]I Y  I[]I N  I[]I U    Mother with mood dysregulation, anger outbursts, and inappropriate anger and language at times    I[x]I Y  I[]I N  I[]I U  Hx of Trauma/Neglect:   I[]I Y  I[]I N  I[x]I U  Hx of Physical Abuse:   I[]I Y  I[]I N  I[x]I U  Hx of Sexual Abuse:   I[]I Y  I[x]I N  I[]I U  Happy Childhood:   I[]I Y  I[x]I N  I[]I U  Significant Developmental Delay/Disability:   I[x]I Y  I[]I N  I[]I U  GED/High School Diploma or Beyond:   I[x]I Y  I[]I N  I[]I U  Currently Employed:   I[]I Y  I[x]I N  I[]I U  On or Applying for Disability:   I[x]I Y  I[]I N  I[]I U  Functions Independently:   I[x]I Y  I[]I N  I[]I U  Financially Stable:   I[x]I Y  I[]I N  I[]I U  Domiciled:   I[]I Y  I[x]I N  I[]I U  Intact Support System: Talks with brother nearly daily, brother  "tells pt to "get over it" and "stop being so dramatic"  Wife tells pt to "stop making everything about himself"  Pt states maybe he is a narcissist?  I[x]I Y  I[]I N  I[]I U  Heterosexual/Cisgender:   I[x]I Y  I[]I N  I[]I U  Currently in a Romantic Relationship:   I[x]I Y  I[]I N  I[]I U  Ever :   I[x]I Y  I[]I N  I[]I U  Children/Dependents:   I[]I Y  I[]I N  I[x]I U  Anabaptist/Spiritual:   I[]I Y  I[x]I N  I[]I U   History:   I[]I Y  I[x]I N  I[]I U  Current Legal Issues:   I[x]I Y  I[]I N  I[]I U  Past Charges/Convictions: theft as a child  I[]I Y  I[]I N  I[x]I U  Hx of Incarceration:   I[]I Y  I[x]I N  I[]I U  Access to a Gun?:      I[]I Y  I[]I N  I[x]I U  Hx of Seizure:   I[]I Y  I[]I N  I[x]I U  Hx of Significant Head Injury (e.g., Loss of Consciousness, Concussion, Coma):   I[x]I Y  I[]I N  I[x]I U  Medical History & Diagnoses:     The patient's past medical history has been reviewed and updated as appropriate within the electronic medical record system.    * No active hospital problems. *     Scheduled and PRN Medications: The electronic chart was reviewed and updated as appropriate.  See Medcard for details.    Current Outpatient Medications:     atorvastatin (LIPITOR) 40 MG tablet, TAKE 1 TABLET BY MOUTH DAILY, Disp: 90 tablet, Rfl: 1    chlorthalidone (HYGROTEN) 50 MG Tab, Take 1 tablet (50 mg total) by mouth once daily., Disp: 90 tablet, Rfl: 1    fluticasone propionate (FLONASE) 50 mcg/actuation nasal spray, 1 spray by Each Nostril route., Disp: , Rfl:     ibuprofen-diphenhydramine cit 200-38 mg Tab, Take 1 tablet by mouth nightly as needed., Disp: , Rfl:     lisinopriL (PRINIVIL,ZESTRIL) 40 MG tablet, Take 1 tablet (40 mg total) by mouth once daily., Disp: 90 tablet, Rfl: 1    NIFEdipine (PROCARDIA-XL) 90 MG (OSM) 24 hr tablet, Take 1 tablet (90 mg total) by mouth once daily., Disp: 90 tablet, Rfl: 1    Allergies:  Patient has no known allergies.    Additional Relevant History, As " Applicable:       EXAMINATION:     There were no vitals taken for this visit.        DIAGNOSES  PROBLEMS  PSYCHOTROPICS:     Pt with severe depression and anxiety, depression mostly centered around family issues.  Somewhat bizarre habit of watching people commit suicide online- also noted in eval 3 years ago.  Very similar symptomatology, pt states that his depression has more or less been stable since then.  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  Prominent sxs of anger/irritability and mood instability.  Noted FH of irritability/mood instability/ difficulty controlling anger in pt's mother.  Ddx includes cluster B personality disorder (likely borderline), MDD, MAGGIE, hx of disruptive mood dysregulation disorder or ODD?  Need to more closely assess: relationship hx and BPD sxs, substance use hx, legal history, and hx of abuse.  Aripiprazole low dose may be very helpful for mood regulation, impulse control, and depression.  Will defer medication choice until after second evaluation appointment.        RISK MANAGEMENT:      -- SUICIDAL IDEATION (current  as voiced during the assessment): ENDORSES      -- HOMICIDAL IDEATION (current  as voiced during the assessment): DENIES      -- NON-SUICIDAL SELF-INJURIOUS BEHAVIOR (current  to the episode/presentation): ABSENT      -- PERPETRATED VIOLENCE (current  to the episode/presentation): ABSENT     INFORMED CONSENT & SHARED DECISION MAKING are the hallmark and bedrock of good clinical care, and as such have been employed and obtained, respectively, to the degree possible.    NOTE: discussed, to the extent possible, diagnosis, risks and benefits of proposed treatment (e.g., medication, therapy) vs alternative treatments vs no treatment, potential side effects of these treatments, and the inherent unpredictability of treatment.     ADVICE & COUNSELING:   - In cases of emergencies (e.g. SI/HI resulting in danger to  self or others, functioning deteriorating to the level of grave disability), call 911 or [phone], or present to the emergency department for immediate assistance.   - Individuals should not operate a motor vehicle or heavy machinery if effects of medications or underlying symptoms/condition impair the ability to do so safely.   - FULLY comply with ANY/ALL medication as prescribed/instructed and report ANY/ALL suspected adverse effects to appropriate health care providers.            {:PHR,XXINTROTELEVISITLA}  MEDICAL DECISION MAKING & TIME:     Complexity (level) of medical decision making employed in the encounter: MODERATE  A diagnostic psychiatric evaluation was performed and responsiveness to treatment was assessed.  The patient demonstrates adequate ability/capacity to respond to treatment.  The total time spent on the date of the encounter: 90 min minutes    DIAGNOSTIC TESTING:     Wt Readings from Last 3 Encounters:   06/03/24 115.2 kg (253 lb 15.5 oz)   03/08/23 112.7 kg (248 lb 7.3 oz)   07/27/22 114 kg (251 lb 7 oz)     BP Readings from Last 1 Encounters:   06/03/24 (!) 178/130     Pulse Readings from Last 1 Encounters:   06/03/24 105         Glu 100  3/9/2023  Li *   *  TSH 1.117  3/9/2023    HgA1c 4.9  3/9/2023  VPA *   *   FT4 *   *    Na 141  3/9/2023  CLZ *   *  WBC 6.06  3/9/2023    Cr 1.1  3/9/2023  ANC 3.0; 48.9;   3/9/2023   Hgb 16.6  3/9/2023     BUN 10  3/9/2023  Trop I *   *  HCT 46.5  3/9/2023     GFR >60  3/9/2023   CPK *   *    3/9/2023     Alb 4.2  3/9/2023   PRL *   *  B12 *   *     T Bili 1.0  3/9/2023  Chol 205 (H)  3/9/2023  B9 *   *    ALP 75  3/9/2023  TGs 204 (H)  3/9/2023  B1 *   *    AST 31  3/9/2023  HDL 38 (L)  3/9/2023  Vit D *   *     ALT 38  3/9/2023  .2  3/9/2023  HIV *   *     INR *   *  Uma *   *   Hep C *   *    GGT *   *  Lip *   *  RPR *   *    MCV 89  3/9/2023   NH4 *   *  UPT *   *       Results for orders placed or  performed in visit on 05/07/22   IN OFFICE EKG 12-LEAD (to [place])    Collection Time: 05/07/22 12:49 PM    Narrative    Test Reason : K21.9,    Vent. Rate : 086 BPM     Atrial Rate : 086 BPM     P-R Int : 172 ms          QRS Dur : 088 ms      QT Int : 370 ms       P-R-T Axes : 030 026 015 degrees     QTc Int : 442 ms    Normal sinus rhythm  Normal ECG  When compared with ECG of 17-JUN-2020 15:05,  Premature atrial complexes are no longer Present  Confirmed by Enrique Young MD ([id number]) on 5/8/2022 11:02:35 AM    Referred By: GONSALO FELIPE           Confirmed By:Enrique Young MD

## 2024-06-12 NOTE — PROGRESS NOTES
The patient location is: Home at address on record in [place]  The chief complaint leading to consultation is: Depression    Visit type: audiovisual    Face to Face time with patient: 50 min  90 minutes of total time spent on the encounter, which includes face to face time and non-face to face time preparing to see the patient (eg, review of tests), Obtaining and/or reviewing separately obtained history, Documenting clinical information in the electronic or other health record, Independently interpreting results (not separately reported) and communicating results to the patient/family/caregiver, or Care coordination (not separately reported).         Each patient to whom he or she provides medical services by telemedicine is:  (1) informed of the relationship between the physician and patient and the respective role of any other health care provider with respect to management of the patient; and (2) notified that he or she may decline to receive medical services by telemedicine and may withdraw from such care at any time.    Notes:     [id number]}   6/12/2024   PHQ-9 Depression Patient Health Questionnaire    Over the last two weeks how often have you been bothered by little interest or pleasure in doing things 0    Over the last two weeks how often have you been bothered by feeling down, depressed or hopeless 3    Over the last two weeks how often have you been bothered by trouble falling or staying asleep, or sleeping too much 3    Over the last two weeks how often have you been bothered by feeling tired or having little energy 2    Over the last two weeks how often have you been bothered by a poor appetite or overeating 3    Over the last two weeks how often have you been bothered by feeling bad about yourself - or that you are a failure or have let yourself or your family down 3    Over the last two weeks how often have you been bothered by trouble concentrating on things, such as reading the newspaper or  watching television 1    Over the last two weeks how often have you been bothered by moving or speaking so slowly that other people could have noticed. 2    Over the last two weeks how often have you been bothered by thoughts that you would be better off dead, or of hurting yourself 2    If you checked off any problems, how difficult have these problems made it for you to do your work, take care of things at home or get along with other people? Not difficult at all    PHQ-9 Score 19    MAGGIE-7    Was test performed? Yes    1. Feeling nervous, anxious, or on edge? Nearly everyday    2. Not being able to stop or control worrying? Nearly everyday    3. Worrying too much about different things? Nearly everyday    4. Trouble relaxing? Nearly everyday    5. Being so restless that it is hard to sit still? Nearly everyday    6. Becoming easily annoyed or irritable? Nearly everyday    7. Feeling afraid as if something awful might happen? More than half the days    8. If you checked off any problems, how difficult have these problems made it for you to do your work, take care of things at home, or get along with other people? Somewhat difficult    MAGGIE-7 Score 20    Number answered (out of first 7) 7    Interpretation Severe Anxiety          INITIAL VISIT: PSYCHIATRY  Outpatient Clinic    PRESENTATION:     Romain French is a 44 y.o. patient seen for an initial psychiatric evaluation.  A history of the presenting illness (HPI) was obtained, and a pertinent psychiatric and medical review of systems was performed.    Per Chart:  Hx of psych eval 3 years ago with similar complaints as current.    Per Patient:He   Pt presents for evaluation and treatment of depresion.  He was referred by his PCP.  He states that for the past 5 years or so, ever since discovering his wife speaking to another man, he has been feeling varying degrees of sadness daily.  States he also feels very irritable and angry at times.  Reports that the anger will  "precede the depression.  Endorses many sxs on PHQ-9, see results above.  He cites family struggles and his marriage as the main source of his distress.  He states that his wife has mentioned divorce a few times, and that feeling un liked by her makes him feel upset.  He has 3 children living with him and his wife, and when his 20 year old son isn't productive, pt says this makes him mad.  Pt reports that he does feel like he wouldn't mind if he were dead, and has stopped treating his HTN, saying that it would be OK if he were to have a heart attack and die.  He states multiple times that he does not want to end his life and does not feel he is in danger of attempting suicide.  Does not report access to guns.  Admits to watching dark web videos of people committing suicide, states that he compares his life to what he imagines theirs must be like so that he will see how much his life isn't that bad.  Reports watching these videos makes him not want to commit suicide.  He denies every having attempted suicide in the past.        [] Y  [x] N  sleep disturbance:  Globally: uses ibuprofen PM every night  Positive for: regular use of sleep aides  [x] Y  [] N  appetite/weight change:  Globally: endorsed but not observed Positive for: weight gain (unintentional)  [] Y  [x] N  fatigue/anergia:   [] Y  [x] N  impairment in focus/concentration:    [x] Y  [] N  depression: Globally: observed, severe, fluctuating   [x] Y  [] N  anxiety/worry:   [] Y  [x] N  somatically preoccupied:   [x] Y  [] N  dysregulated mood/behavior:  Positive for: moodiness, mood lability, mood goes "up and down", irritability, impulsivity   [] Y  [x] N  manic symptomatology:   [] Y  [x] N  psychosis:     [] Y  [x] N  pain:   [] Y  [x] N  cardiac symptoms:   [] Y  [x] N  abnormal movements:         CURRENT PSYCHOTROPIC REGIMEN:  NONE- did not start bupropion as PCP rx'd        HISTORY:     I[]I Patient " unable or unwilling to provide any history.    I[x]I Y  I[]I N  I[]I U  Psychiatric Diagnoses/Symptomatology:   I[]I Y  I[x]I N  I[]I U  Hx of Psychiatric Hospitalization:   I[]I Y  I[x]I N  I[]I U  Hx of Outpatient Psychiatric Treatment (psychiatry/psychotherapy):   I[]I Y  I[x]I N  I[]I U  Psychotropic Trials:   I[]I Y  I[x]I N  I[]I U  Prior Suicide Attempts:   I[x]I Y  I[]I N  I[]I U  Hx of Suicidal Ideation:   I[]I Y  I[x]I N  I[]I U  Hx of Homicidal Ideation:   I[]I Y  I[x]I N  I[]I U  Hx of Self-Injurious Behavior (Non-Suicidal):   I[]I Y  I[x]I N  I[]I U  Hx of Violence:   I[]I Y  I[x]I N  I[]I U  Documented Hx of Malingering:   I[]I Y  I[x]I N  I[]I U  Hx of Detox:   I[]I Y  I[x]I N  I[]I U  Hx of Rehab:     I[]I Y  I[]I N  I[x]I U  I[]I Former  Nicotine Use:    I[]I Y  I[]I N  I[x]I U  I[]I Former  Alcohol Consumption:   I[]I Y  I[]I N  I[x]I U  I[]I Former  Alcohol Misuse/Abuse:   I[]I Y  I[]I N  I[x]I U  I[]I Former  Illicit Drug Use/Misuse/Abuse:   I[]I Y  I[]I N  I[x]I U  I[]I Former  Misuse/Abuse of Rx Medications:   I[]I Cannabis  I[]I Cocaine  I[]I Heroin  I[]I Meth  I[]I Opioids  I[]I Stimulants  I[]I Benzos  I[]I Other:       FAMILY HISTORY:  I[x]I Y  I[]I N  I[]I U    Mother with mood dysregulation, anger outbursts, and inappropriate anger and language at times    I[x]I Y  I[]I N  I[]I U  Hx of Trauma/Neglect:   I[]I Y  I[]I N  I[x]I U  Hx of Physical Abuse:   I[]I Y  I[]I N  I[x]I U  Hx of Sexual Abuse:   I[]I Y  I[x]I N  I[]I U  Happy Childhood:   I[]I Y  I[x]I N  I[]I U  Significant Developmental Delay/Disability:   I[x]I Y  I[]I N  I[]I U  GED/High School Diploma or Beyond:   I[x]I Y  I[]I N  I[]I U  Currently Employed:   I[]I Y  I[x]I N  I[]I U  On or Applying for Disability:   I[x]I Y  I[]I N  I[]I U  Functions Independently:   I[x]I Y  I[]I N  I[]I U  Financially Stable:   I[x]I Y  I[]I N  I[]I U  Domiciled:   I[]I Y  I[x]I N  I[]I U  Intact Support System: Talks with brother nearly daily,  "brother tells pt to "get over it" and "stop being so dramatic"  Wife tells pt to "stop making everything about himself"  Pt states maybe he is a narcissist?  I[x]I Y  I[]I N  I[]I U  Heterosexual/Cisgender:   I[x]I Y  I[]I N  I[]I U  Currently in a Romantic Relationship:   I[x]I Y  I[]I N  I[]I U  Ever :   I[x]I Y  I[]I N  I[]I U  Children/Dependents:   I[]I Y  I[]I N  I[x]I U  Hoahaoism/Spiritual:   I[]I Y  I[x]I N  I[]I U   History:   I[]I Y  I[x]I N  I[]I U  Current Legal Issues:   I[x]I Y  I[]I N  I[]I U  Past Charges/Convictions: theft as a child  I[]I Y  I[]I N  I[x]I U  Hx of Incarceration:   I[]I Y  I[x]I N  I[]I U  Access to a Gun?:      I[]I Y  I[]I N  I[x]I U  Hx of Seizure:   I[]I Y  I[]I N  I[x]I U  Hx of Significant Head Injury (e.g., Loss of Consciousness, Concussion, Coma):   I[x]I Y  I[]I N  I[x]I U  Medical History & Diagnoses:     The patient's past medical history has been reviewed and updated as appropriate within the electronic medical record system.    * No active hospital problems. *     Scheduled and PRN Medications: The electronic chart was reviewed and updated as appropriate.  See Medcard for details.    Current Outpatient Medications:     atorvastatin (LIPITOR) 40 MG tablet, TAKE 1 TABLET BY MOUTH DAILY, Disp: 90 tablet, Rfl: 1    chlorthalidone (HYGROTEN) 50 MG Tab, Take 1 tablet (50 mg total) by mouth once daily., Disp: 90 tablet, Rfl: 1    fluticasone propionate (FLONASE) 50 mcg/actuation nasal spray, 1 spray by Each Nostril route., Disp: , Rfl:     ibuprofen-diphenhydramine cit 200-38 mg Tab, Take 1 tablet by mouth nightly as needed., Disp: , Rfl:     lisinopriL (PRINIVIL,ZESTRIL) 40 MG tablet, Take 1 tablet (40 mg total) by mouth once daily., Disp: 90 tablet, Rfl: 1    NIFEdipine (PROCARDIA-XL) 90 MG (OSM) 24 hr tablet, Take 1 tablet (90 mg total) by mouth once daily., Disp: 90 tablet, Rfl: 1    Allergies:  Patient has no known allergies.    Additional Relevant " History, As Applicable:       EXAMINATION:     There were no vitals taken for this visit.        DIAGNOSES  PROBLEMS  PSYCHOTROPICS:     Pt with severe depression and anxiety, depression mostly centered around family issues.  Somewhat bizarre habit of watching people commit suicide online- also noted in eval 3 years ago.  Very similar symptomatology, pt states that his depression has more or less been stable since then.  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  Prominent sxs of anger/irritability and mood instability.  Noted FH of irritability/mood instability/ difficulty controlling anger in pt's mother.  Ddx includes cluster B personality disorder (likely borderline), MDD, MAGGIE, hx of disruptive mood dysregulation disorder or ODD?  Need to more closely assess: relationship hx and BPD sxs, substance use hx, legal history, and hx of abuse.  Aripiprazole low dose may be very helpful for mood regulation, impulse control, and depression.  Will defer medication choice until after second evaluation appointment.        RISK MANAGEMENT:      -- SUICIDAL IDEATION (current  as voiced during the assessment): ENDORSES      -- HOMICIDAL IDEATION (current  as voiced during the assessment): DENIES      -- NON-SUICIDAL SELF-INJURIOUS BEHAVIOR (current  to the episode/presentation): ABSENT      -- PERPETRATED VIOLENCE (current  to the episode/presentation): ABSENT     INFORMED CONSENT & SHARED DECISION MAKING are the hallmark and bedrock of good clinical care, and as such have been employed and obtained, respectively, to the degree possible.    NOTE: discussed, to the extent possible, diagnosis, risks and benefits of proposed treatment (e.g., medication, therapy) vs alternative treatments vs no treatment, potential side effects of these treatments, and the inherent unpredictability of treatment.     ADVICE & COUNSELING:   - In cases of emergencies (e.g. SI/HI resulting in  danger to self or others, functioning deteriorating to the level of grave disability), call 911 or [phone], or present to the emergency department for immediate assistance.   - Individuals should not operate a motor vehicle or heavy machinery if effects of medications or underlying symptoms/condition impair the ability to do so safely.   - FULLY comply with ANY/ALL medication as prescribed/instructed and report ANY/ALL suspected adverse effects to appropriate health care providers.            {:PHR,XXINTROTELEVISITLA}  MEDICAL DECISION MAKING & TIME:     Complexity (level) of medical decision making employed in the encounter: MODERATE  A diagnostic psychiatric evaluation was performed and responsiveness to treatment was assessed.  The patient demonstrates adequate ability/capacity to respond to treatment.  The total time spent on the date of the encounter: 90 min minutes    DIAGNOSTIC TESTING:     Wt Readings from Last 3 Encounters:   06/03/24 115.2 kg (253 lb 15.5 oz)   03/08/23 112.7 kg (248 lb 7.3 oz)   07/27/22 114 kg (251 lb 7 oz)     BP Readings from Last 1 Encounters:   06/03/24 (!) 178/130     Pulse Readings from Last 1 Encounters:   06/03/24 105         Glu 100  3/9/2023  Li *   *  TSH 1.117  3/9/2023    HgA1c 4.9  3/9/2023  VPA *   *   FT4 *   *    Na 141  3/9/2023  CLZ *   *  WBC 6.06  3/9/2023    Cr 1.1  3/9/2023  ANC 3.0; 48.9;   3/9/2023   Hgb 16.6  3/9/2023     BUN 10  3/9/2023  Trop I *   *  HCT 46.5  3/9/2023     GFR >60  3/9/2023   CPK *   *    3/9/2023     Alb 4.2  3/9/2023   PRL *   *  B12 *   *     T Bili 1.0  3/9/2023  Chol 205 (H)  3/9/2023  B9 *   *    ALP 75  3/9/2023  TGs 204 (H)  3/9/2023  B1 *   *    AST 31  3/9/2023  HDL 38 (L)  3/9/2023  Vit D *   *     ALT 38  3/9/2023  .2  3/9/2023  HIV *   *     INR *   *  Uma *   *   Hep C *   *    GGT *   *  Lip *   *  RPR *   *    MCV 89  3/9/2023   NH4 *   *  UPT *   *       Results for orders  placed or performed in visit on 05/07/22   IN OFFICE EKG 12-LEAD (to [place])    Collection Time: 05/07/22 12:49 PM    Narrative    Test Reason : K21.9,    Vent. Rate : 086 BPM     Atrial Rate : 086 BPM     P-R Int : 172 ms          QRS Dur : 088 ms      QT Int : 370 ms       P-R-T Axes : 030 026 015 degrees     QTc Int : 442 ms    Normal sinus rhythm  Normal ECG  When compared with ECG of 17-JUN-2020 15:05,  Premature atrial complexes are no longer Present  Confirmed by Hannah PETERS, Enrique ([id number]) on 5/8/2022 11:02:35 AM    Referred By: GONSALO FELIPE           Confirmed By:Enrique Young MD

## 2024-06-12 NOTE — PROGRESS NOTES
Outpatient Psychiatry Initial Visit (PA-C)    6/12/2024    Romain French, a 44 y.o. male, presenting for initial evaluation visit. Met with patient.    The patient location is: Home at address on record in [place]  The chief complaint leading to consultation is: Depression    Visit type: audiovisual    Face to Face time with patient: 50 min  90 minutes of total time spent on the encounter, which includes face to face time and non-face to face time preparing to see the patient (eg, review of tests), Obtaining and/or reviewing separately obtained history, Documenting clinical information in the electronic or other health record, Independently interpreting results (not separately reported) and communicating results to the patient/family/caregiver, or Care coordination (not separately reported).         Each patient to whom he or she provides medical services by telemedicine is:  (1) informed of the relationship between the physician and patient and the respective role of any other health care provider with respect to management of the patient; and (2) notified that he or she may decline to receive medical services by telemedicine and may withdraw from such care at any time.    Notes:      6/12/2024   PHQ-9 Depression Patient Health Questionnaire    Over the last two weeks how often have you been bothered by little interest or pleasure in doing things 0    Over the last two weeks how often have you been bothered by feeling down, depressed or hopeless 3    Over the last two weeks how often have you been bothered by trouble falling or staying asleep, or sleeping too much 3    Over the last two weeks how often have you been bothered by feeling tired or having little energy 2    Over the last two weeks how often have you been bothered by a poor appetite or overeating 3    Over the last two weeks how often have you been bothered by feeling bad about yourself - or that you are a failure or have let yourself or your family  down 3    Over the last two weeks how often have you been bothered by trouble concentrating on things, such as reading the newspaper or watching television 1    Over the last two weeks how often have you been bothered by moving or speaking so slowly that other people could have noticed. 2    Over the last two weeks how often have you been bothered by thoughts that you would be better off dead, or of hurting yourself 2    If you checked off any problems, how difficult have these problems made it for you to do your work, take care of things at home or get along with other people? Not difficult at all    PHQ-9 Score 19    MAGGIE-7    Was test performed? Yes    1. Feeling nervous, anxious, or on edge? Nearly everyday    2. Not being able to stop or control worrying? Nearly everyday    3. Worrying too much about different things? Nearly everyday    4. Trouble relaxing? Nearly everyday    5. Being so restless that it is hard to sit still? Nearly everyday    6. Becoming easily annoyed or irritable? Nearly everyday    7. Feeling afraid as if something awful might happen? More than half the days    8. If you checked off any problems, how difficult have these problems made it for you to do your work, take care of things at home, or get along with other people? Somewhat difficult    MAGGIE-7 Score 20    Number answered (out of first 7) 7    Interpretation Severe Anxiety          INITIAL VISIT: PSYCHIATRY  Outpatient Clinic    PRESENTATION:     Romain French is a 44 y.o. patient seen for an initial psychiatric evaluation.  A history of the presenting illness (HPI) was obtained, and a pertinent psychiatric and medical review of systems was performed.    Per Chart:  Hx of psych eval 3 years ago with similar complaints as current.    Per Patient:He   Pt presents for evaluation and treatment of depresion.  He was referred by his PCP.  He states that for the past 5 years or so, ever since discovering his wife speaking to another man, he has  "been feeling varying degrees of sadness daily.  States he also feels very irritable and angry at times.  Reports that the anger will precede the depression.  Endorses many sxs on PHQ-9, see results above.  He cites family struggles and his marriage as the main source of his distress.  He states that his wife has mentioned divorce a few times, and that feeling un liked by her makes him feel upset.  He has 3 children living with him and his wife, and when his 20 year old son isn't productive, pt says this makes him mad.  Pt reports that he does feel like he wouldn't mind if he were dead, and has stopped treating his HTN, saying that it would be OK if he were to have a heart attack and die.  He states multiple times that he does not want to end his life and does not feel he is in danger of attempting suicide.  Does not report access to guns.  Admits to watching dark web videos of people committing suicide, states that he compares his life to what he imagines theirs must be like so that he will see how much his life isn't that bad.  Reports watching these videos makes him not want to commit suicide.  He denies every having attempted suicide in the past.        [] Y  [x] N  sleep disturbance:  Globally: uses ibuprofen PM every night Positive for: regular use of sleep aides  [x] Y  [] N  appetite/weight change:  Globally: endorsed but not observed Positive for: weight gain (unintentional)  [] Y  [x] N  fatigue/anergia:   [] Y  [x] N  impairment in focus/concentration:    [x] Y  [] N  depression: Globally: observed, severe, fluctuating   [x] Y  [] N  anxiety/worry:   [] Y  [x] N  somatically preoccupied:   [x] Y  [] N  dysregulated mood/behavior:  Positive for: moodiness, mood lability, mood goes "up and down", irritability, impulsivity   [] Y  [x] N  manic symptomatology:   [] Y  [x] N  psychosis:     [] Y  [x] N  pain:   [] Y  [x] N  cardiac symptoms:   [] Y  [x] N  " abnormal movements:         CURRENT PSYCHOTROPIC REGIMEN:  NONE- did not start bupropion as PCP rx'd        HISTORY:     I[]I Patient unable or unwilling to provide any history.    I[x]I Y  I[]I N  I[]I U  Psychiatric Diagnoses/Symptomatology:   I[]I Y  I[x]I N  I[]I U  Hx of Psychiatric Hospitalization:   I[]I Y  I[x]I N  I[]I U  Hx of Outpatient Psychiatric Treatment (psychiatry/psychotherapy):   I[]I Y  I[x]I N  I[]I U  Psychotropic Trials:   I[]I Y  I[x]I N  I[]I U  Prior Suicide Attempts:   I[x]I Y  I[]I N  I[]I U  Hx of Suicidal Ideation:   I[]I Y  I[x]I N  I[]I U  Hx of Homicidal Ideation:   I[]I Y  I[x]I N  I[]I U  Hx of Self-Injurious Behavior (Non-Suicidal):   I[]I Y  I[x]I N  I[]I U  Hx of Violence:   I[]I Y  I[x]I N  I[]I U  Documented Hx of Malingering:   I[]I Y  I[x]I N  I[]I U  Hx of Detox:   I[]I Y  I[x]I N  I[]I U  Hx of Rehab:     I[]I Y  I[]I N  I[x]I U  I[]I Former  Nicotine Use:    I[]I Y  I[]I N  I[x]I U  I[]I Former  Alcohol Consumption:   I[]I Y  I[]I N  I[x]I U  I[]I Former  Alcohol Misuse/Abuse:   I[]I Y  I[]I N  I[x]I U  I[]I Former  Illicit Drug Use/Misuse/Abuse:   I[]I Y  I[]I N  I[x]I U  I[]I Former  Misuse/Abuse of Rx Medications:   I[]I Cannabis  I[]I Cocaine  I[]I Heroin  I[]I Meth  I[]I Opioids  I[]I Stimulants  I[]I Benzos  I[]I Other:       FAMILY HISTORY:  I[x]I Y  I[]I N  I[]I U    Mother with mood dysregulation, anger outbursts, and inappropriate anger and language at times    I[x]I Y  I[]I N  I[]I U  Hx of Trauma/Neglect:   I[]I Y  I[]I N  I[x]I U  Hx of Physical Abuse:   I[]I Y  I[]I N  I[x]I U  Hx of Sexual Abuse:   I[]I Y  I[x]I N  I[]I U  Happy Childhood:   I[]I Y  I[x]I N  I[]I U  Significant Developmental Delay/Disability:   I[x]I Y  I[]I N  I[]I U  GED/High School Diploma or Beyond:   I[x]I Y  I[]I N  I[]I U  Currently Employed:   I[]I Y  I[x]I N  I[]I U  On or Applying for Disability:   I[x]I Y  I[]I N  I[]I U  Functions Independently:   I[x]I Y  I[]I N  I[]I U   "Financially Stable:   I[x]I Y  I[]I N  I[]I U  Domiciled:   I[]I Y  I[x]I N  I[]I U  Intact Support System: Talks with brother nearly daily, brother tells pt to "get over it" and "stop being so dramatic"  Wife tells pt to "stop making everything about himself"  Pt states maybe he is a narcissist?  I[x]I Y  I[]I N  I[]I U  Heterosexual/Cisgender:   I[x]I Y  I[]I N  I[]I U  Currently in a Romantic Relationship:   I[x]I Y  I[]I N  I[]I U  Ever :   I[x]I Y  I[]I N  I[]I U  Children/Dependents:   I[]I Y  I[]I N  I[x]I U  Mu-ism/Spiritual:   I[]I Y  I[x]I N  I[]I U   History:   I[]I Y  I[x]I N  I[]I U  Current Legal Issues:   I[x]I Y  I[]I N  I[]I U  Past Charges/Convictions: theft as a child  I[]I Y  I[]I N  I[x]I U  Hx of Incarceration:   I[]I Y  I[x]I N  I[]I U  Access to a Gun?:      I[]I Y  I[]I N  I[x]I U  Hx of Seizure:   I[]I Y  I[]I N  I[x]I U  Hx of Significant Head Injury (e.g., Loss of Consciousness, Concussion, Coma):   I[x]I Y  I[]I N  I[x]I U  Medical History & Diagnoses:     The patient's past medical history has been reviewed and updated as appropriate within the electronic medical record system.    * No active hospital problems. *     Scheduled and PRN Medications: The electronic chart was reviewed and updated as appropriate.  See Medcard for details.    Current Outpatient Medications:     atorvastatin (LIPITOR) 40 MG tablet, TAKE 1 TABLET BY MOUTH DAILY, Disp: 90 tablet, Rfl: 1    chlorthalidone (HYGROTEN) 50 MG Tab, Take 1 tablet (50 mg total) by mouth once daily., Disp: 90 tablet, Rfl: 1    fluticasone propionate (FLONASE) 50 mcg/actuation nasal spray, 1 spray by Each Nostril route., Disp: , Rfl:     ibuprofen-diphenhydramine cit 200-38 mg Tab, Take 1 tablet by mouth nightly as needed., Disp: , Rfl:     lisinopriL (PRINIVIL,ZESTRIL) 40 MG tablet, Take 1 tablet (40 mg total) by mouth once daily., Disp: 90 tablet, Rfl: 1    NIFEdipine (PROCARDIA-XL) 90 MG (OSM) 24 hr tablet, Take 1 " tablet (90 mg total) by mouth once daily., Disp: 90 tablet, Rfl: 1    Allergies:  Patient has no known allergies.    Additional Relevant History, As Applicable:       EXAMINATION:     There were no vitals taken for this visit.        DIAGNOSES  PROBLEMS  PSYCHOTROPICS:     Pt with severe depression and anxiety, depression mostly centered around family issues.  Somewhat bizarre habit of watching people commit suicide online- also noted in eval 3 years ago.  Very similar symptomatology, pt states that his depression has more or less been stable since then.  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  Prominent sxs of anger/irritability and mood instability.  Noted FH of irritability/mood instability/ difficulty controlling anger in pt's mother.  Ddx includes cluster B personality disorder (likely borderline), MDD, MAGGIE, hx of disruptive mood dysregulation disorder or ODD?  Need to more closely assess: relationship hx and BPD sxs, substance use hx, legal history, and hx of abuse.  Aripiprazole low dose may be very helpful for mood regulation, impulse control, and depression.  Will defer medication choice until after second evaluation appointment.        RISK MANAGEMENT:      -- SUICIDAL IDEATION (current  as voiced during the assessment): ENDORSES      -- HOMICIDAL IDEATION (current  as voiced during the assessment): DENIES      -- NON-SUICIDAL SELF-INJURIOUS BEHAVIOR (current  to the episode/presentation): ABSENT      -- PERPETRATED VIOLENCE (current  to the episode/presentation): ABSENT     INFORMED CONSENT & SHARED DECISION MAKING are the hallmark and bedrock of good clinical care, and as such have been employed and obtained, respectively, to the degree possible.    NOTE: discussed, to the extent possible, diagnosis, risks and benefits of proposed treatment (e.g., medication, therapy) vs alternative treatments vs no treatment, potential side effects of these  treatments, and the inherent unpredictability of treatment.     ADVICE & COUNSELING:   - In cases of emergencies (e.g. SI/HI resulting in danger to self or others, functioning deteriorating to the level of grave disability), call 911 or [phone], or present to the emergency department for immediate assistance.   - Individuals should not operate a motor vehicle or heavy machinery if effects of medications or underlying symptoms/condition impair the ability to do so safely.   - FULLY comply with ANY/ALL medication as prescribed/instructed and report ANY/ALL suspected adverse effects to appropriate health care providers.            {:PHR,XXINTROTELEVISITLA}  MEDICAL DECISION MAKING & TIME:     Complexity (level) of medical decision making employed in the encounter: MODERATE  A diagnostic psychiatric evaluation was performed and responsiveness to treatment was assessed.  The patient demonstrates adequate ability/capacity to respond to treatment.  The total time spent on the date of the encounter: 90 min minutes    DIAGNOSTIC TESTING:     Wt Readings from Last 3 Encounters:   06/03/24 115.2 kg (253 lb 15.5 oz)   03/08/23 112.7 kg (248 lb 7.3 oz)   07/27/22 114 kg (251 lb 7 oz)     BP Readings from Last 1 Encounters:   06/03/24 (!) 178/130     Pulse Readings from Last 1 Encounters:   06/03/24 105            Results for orders placed or performed in visit on 05/07/22   IN OFFICE EKG 12-LEAD (to [place])    Collection Time: 05/07/22 12:49 PM    Narrative    Test Reason : K21.9,    Vent. Rate : 086 BPM     Atrial Rate : 086 BPM     P-R Int : 172 ms          QRS Dur : 088 ms      QT Int : 370 ms       P-R-T Axes : 030 026 015 degrees     QTc Int : 442 ms    Normal sinus rhythm  Normal ECG  When compared with ECG of 17-JUN-2020 15:05,  Premature atrial complexes are no longer Present  Confirmed by Enrique Young MD ([id number]) on 5/8/2022 11:02:35 AM    Referred By: GONSALO FELIPE           Confirmed By:Enrique Young MD

## 2024-06-12 NOTE — PROGRESS NOTES
The patient location is: Home at address on record in [place]  The chief complaint leading to consultation is: Depression    Visit type: audiovisual    Face to Face time with patient: 50 min  90 minutes of total time spent on the encounter, which includes face to face time and non-face to face time preparing to see the patient (eg, review of tests), Obtaining and/or reviewing separately obtained history, Documenting clinical information in the electronic or other health record, Independently interpreting results (not separately reported) and communicating results to the patient/family/caregiver, or Care coordination (not separately reported).         Each patient to whom he or she provides medical services by telemedicine is:  (1) informed of the relationship between the physician and patient and the respective role of any other health care provider with respect to management of the patient; and (2) notified that he or she may decline to receive medical services by telemedicine and may withdraw from such care at any time.    Notes:      6/12/2024   PHQ-9 Depression Patient Health Questionnaire    Over the last two weeks how often have you been bothered by little interest or pleasure in doing things 0    Over the last two weeks how often have you been bothered by feeling down, depressed or hopeless 3    Over the last two weeks how often have you been bothered by trouble falling or staying asleep, or sleeping too much 3    Over the last two weeks how often have you been bothered by feeling tired or having little energy 2    Over the last two weeks how often have you been bothered by a poor appetite or overeating 3    Over the last two weeks how often have you been bothered by feeling bad about yourself - or that you are a failure or have let yourself or your family down 3    Over the last two weeks how often have you been bothered by trouble concentrating on things, such as reading the newspaper or watching  television 1    Over the last two weeks how often have you been bothered by moving or speaking so slowly that other people could have noticed. 2    Over the last two weeks how often have you been bothered by thoughts that you would be better off dead, or of hurting yourself 2    If you checked off any problems, how difficult have these problems made it for you to do your work, take care of things at home or get along with other people? Not difficult at all    PHQ-9 Score 19    MAGGIE-7    Was test performed? Yes    1. Feeling nervous, anxious, or on edge? Nearly everyday    2. Not being able to stop or control worrying? Nearly everyday    3. Worrying too much about different things? Nearly everyday    4. Trouble relaxing? Nearly everyday    5. Being so restless that it is hard to sit still? Nearly everyday    6. Becoming easily annoyed or irritable? Nearly everyday    7. Feeling afraid as if something awful might happen? More than half the days    8. If you checked off any problems, how difficult have these problems made it for you to do your work, take care of things at home, or get along with other people? Somewhat difficult    MAGGIE-7 Score 20    Number answered (out of first 7) 7    Interpretation Severe Anxiety          INITIAL VISIT: PSYCHIATRY  Outpatient Clinic    PRESENTATION:     Romain French is a 44 y.o. patient seen for an initial psychiatric evaluation.  A history of the presenting illness (HPI) was obtained, and a pertinent psychiatric and medical review of systems was performed.    Per Chart:  Hx of psych eval 3 years ago with similar complaints as current.    Per Patient:He   Pt presents for evaluation and treatment of depresion.  He was referred by his PCP.  He states that for the past 5 years or so, ever since discovering his wife speaking to another man, he has been feeling varying degrees of sadness daily.  States he also feels very irritable and angry at times.  Reports that the anger will precede the  "depression.  Endorses many sxs on PHQ-9, see results above.  He cites family struggles and his marriage as the main source of his distress.  He states that his wife has mentioned divorce a few times, and that feeling un liked by her makes him feel upset.  He has 3 children living with him and his wife, and when his 20 year old son isn't productive, pt says this makes him mad.  Pt reports that he does feel like he wouldn't mind if he were dead, and has stopped treating his HTN, saying that it would be OK if he were to have a heart attack and die.  He states multiple times that he does not want to end his life and does not feel he is in danger of attempting suicide.  Does not report access to guns.  Admits to watching dark web videos of people committing suicide, states that he compares his life to what he imagines theirs must be like so that he will see how much his life isn't that bad.  Reports watching these videos makes him not want to commit suicide.  He denies every having attempted suicide in the past.        [] Y  [x] N  sleep disturbance:  Globally: uses ibuprofen PM every night Positive for: regular use of sleep aides  [x] Y  [] N  appetite/weight change:  Globally: endorsed but not observed Positive for: weight gain (unintentional)  [] Y  [x] N  fatigue/anergia:   [] Y  [x] N  impairment in focus/concentration:    [x] Y  [] N  depression: Globally: observed, severe, fluctuating   [x] Y  [] N  anxiety/worry:   [] Y  [x] N  somatically preoccupied:   [x] Y  [] N  dysregulated mood/behavior:  Positive for: moodiness, mood lability, mood goes "up and down", irritability, impulsivity   [] Y  [x] N  manic symptomatology:   [] Y  [x] N  psychosis:     [] Y  [x] N  pain:   [] Y  [x] N  cardiac symptoms:   [] Y  [x] N  abnormal movements:         CURRENT PSYCHOTROPIC REGIMEN:  NONE- did not start bupropion as PCP rx'd        HISTORY:     I[]I Patient unable or " unwilling to provide any history.    I[x]I Y  I[]I N  I[]I U  Psychiatric Diagnoses/Symptomatology:   I[]I Y  I[x]I N  I[]I U  Hx of Psychiatric Hospitalization:   I[]I Y  I[x]I N  I[]I U  Hx of Outpatient Psychiatric Treatment (psychiatry/psychotherapy):   I[]I Y  I[x]I N  I[]I U  Psychotropic Trials:   I[]I Y  I[x]I N  I[]I U  Prior Suicide Attempts:   I[x]I Y  I[]I N  I[]I U  Hx of Suicidal Ideation:   I[]I Y  I[x]I N  I[]I U  Hx of Homicidal Ideation:   I[]I Y  I[x]I N  I[]I U  Hx of Self-Injurious Behavior (Non-Suicidal):   I[]I Y  I[x]I N  I[]I U  Hx of Violence:   I[]I Y  I[x]I N  I[]I U  Documented Hx of Malingering:   I[]I Y  I[x]I N  I[]I U  Hx of Detox:   I[]I Y  I[x]I N  I[]I U  Hx of Rehab:     I[]I Y  I[]I N  I[x]I U  I[]I Former  Nicotine Use:    I[]I Y  I[]I N  I[x]I U  I[]I Former  Alcohol Consumption:   I[]I Y  I[]I N  I[x]I U  I[]I Former  Alcohol Misuse/Abuse:   I[]I Y  I[]I N  I[x]I U  I[]I Former  Illicit Drug Use/Misuse/Abuse:   I[]I Y  I[]I N  I[x]I U  I[]I Former  Misuse/Abuse of Rx Medications:   I[]I Cannabis  I[]I Cocaine  I[]I Heroin  I[]I Meth  I[]I Opioids  I[]I Stimulants  I[]I Benzos  I[]I Other:       FAMILY HISTORY:  I[x]I Y  I[]I N  I[]I U    Mother with mood dysregulation, anger outbursts, and inappropriate anger and language at times    I[x]I Y  I[]I N  I[]I U  Hx of Trauma/Neglect:   I[]I Y  I[]I N  I[x]I U  Hx of Physical Abuse:   I[]I Y  I[]I N  I[x]I U  Hx of Sexual Abuse:   I[]I Y  I[x]I N  I[]I U  Happy Childhood:   I[]I Y  I[x]I N  I[]I U  Significant Developmental Delay/Disability:   I[x]I Y  I[]I N  I[]I U  GED/High School Diploma or Beyond:   I[x]I Y  I[]I N  I[]I U  Currently Employed:   I[]I Y  I[x]I N  I[]I U  On or Applying for Disability:   I[x]I Y  I[]I N  I[]I U  Functions Independently:   I[x]I Y  I[]I N  I[]I U  Financially Stable:   I[x]I Y  I[]I N  I[]I U  Domiciled:   I[]I Y  I[x]I N  I[]I U  Intact Support System: Talks with brother nearly daily, brother  "tells pt to "get over it" and "stop being so dramatic"  Wife tells pt to "stop making everything about himself"  Pt states maybe he is a narcissist?  I[x]I Y  I[]I N  I[]I U  Heterosexual/Cisgender:   I[x]I Y  I[]I N  I[]I U  Currently in a Romantic Relationship:   I[x]I Y  I[]I N  I[]I U  Ever :   I[x]I Y  I[]I N  I[]I U  Children/Dependents:   I[]I Y  I[]I N  I[x]I U  Sabianism/Spiritual:   I[]I Y  I[x]I N  I[]I U   History:   I[]I Y  I[x]I N  I[]I U  Current Legal Issues:   I[x]I Y  I[]I N  I[]I U  Past Charges/Convictions: theft as a child  I[]I Y  I[]I N  I[x]I U  Hx of Incarceration:   I[]I Y  I[x]I N  I[]I U  Access to a Gun?:      I[]I Y  I[]I N  I[x]I U  Hx of Seizure:   I[]I Y  I[]I N  I[x]I U  Hx of Significant Head Injury (e.g., Loss of Consciousness, Concussion, Coma):   I[x]I Y  I[]I N  I[x]I U  Medical History & Diagnoses:     The patient's past medical history has been reviewed and updated as appropriate within the electronic medical record system.    * No active hospital problems. *     Scheduled and PRN Medications: The electronic chart was reviewed and updated as appropriate.  See Medcard for details.    Current Outpatient Medications:     atorvastatin (LIPITOR) 40 MG tablet, TAKE 1 TABLET BY MOUTH DAILY, Disp: 90 tablet, Rfl: 1    chlorthalidone (HYGROTEN) 50 MG Tab, Take 1 tablet (50 mg total) by mouth once daily., Disp: 90 tablet, Rfl: 1    fluticasone propionate (FLONASE) 50 mcg/actuation nasal spray, 1 spray by Each Nostril route., Disp: , Rfl:     ibuprofen-diphenhydramine cit 200-38 mg Tab, Take 1 tablet by mouth nightly as needed., Disp: , Rfl:     lisinopriL (PRINIVIL,ZESTRIL) 40 MG tablet, Take 1 tablet (40 mg total) by mouth once daily., Disp: 90 tablet, Rfl: 1    NIFEdipine (PROCARDIA-XL) 90 MG (OSM) 24 hr tablet, Take 1 tablet (90 mg total) by mouth once daily., Disp: 90 tablet, Rfl: 1    Allergies:  Patient has no known allergies.    Additional Relevant History, As " Applicable:       EXAMINATION:     There were no vitals taken for this visit.        DIAGNOSES  PROBLEMS  PSYCHOTROPICS:     Pt with severe depression and anxiety, depression mostly centered around family issues.  Somewhat bizarre habit of watching people commit suicide online- also noted in eval 3 years ago.  Very similar symptomatology, pt states that his depression has more or less been stable since then.  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  Prominent sxs of anger/irritability and mood instability.  Noted FH of irritability/mood instability/ difficulty controlling anger in pt's mother.  Ddx includes cluster B personality disorder (likely borderline), MDD, MAGGIE, hx of disruptive mood dysregulation disorder or ODD?  Need to more closely assess: relationship hx and BPD sxs, substance use hx, legal history, and hx of abuse.  Aripiprazole low dose may be very helpful for mood regulation, impulse control, and depression.  Will defer medication choice until after second evaluation appointment.        RISK MANAGEMENT:      -- SUICIDAL IDEATION (current  as voiced during the assessment): ENDORSES      -- HOMICIDAL IDEATION (current  as voiced during the assessment): DENIES      -- NON-SUICIDAL SELF-INJURIOUS BEHAVIOR (current  to the episode/presentation): ABSENT      -- PERPETRATED VIOLENCE (current  to the episode/presentation): ABSENT     INFORMED CONSENT & SHARED DECISION MAKING are the hallmark and bedrock of good clinical care, and as such have been employed and obtained, respectively, to the degree possible.    NOTE: discussed, to the extent possible, diagnosis, risks and benefits of proposed treatment (e.g., medication, therapy) vs alternative treatments vs no treatment, potential side effects of these treatments, and the inherent unpredictability of treatment.     ADVICE & COUNSELING:   - In cases of emergencies (e.g. SI/HI resulting in danger to  self or others, functioning deteriorating to the level of grave disability), call 911 or [phone], or present to the emergency department for immediate assistance.   - Individuals should not operate a motor vehicle or heavy machinery if effects of medications or underlying symptoms/condition impair the ability to do so safely.   - FULLY comply with ANY/ALL medication as prescribed/instructed and report ANY/ALL suspected adverse effects to appropriate health care providers.            {:PHR,XXINTROTELEVISITLA}  MEDICAL DECISION MAKING & TIME:     Complexity (level) of medical decision making employed in the encounter: MODERATE  A diagnostic psychiatric evaluation was performed and responsiveness to treatment was assessed.  The patient demonstrates adequate ability/capacity to respond to treatment.  The total time spent on the date of the encounter: 90 min minutes    DIAGNOSTIC TESTING:     Wt Readings from Last 3 Encounters:   06/03/24 115.2 kg (253 lb 15.5 oz)   03/08/23 112.7 kg (248 lb 7.3 oz)   07/27/22 114 kg (251 lb 7 oz)     BP Readings from Last 1 Encounters:   06/03/24 (!) 178/130     Pulse Readings from Last 1 Encounters:   06/03/24 105         Glu 100  3/9/2023  Li *   *  TSH 1.117  3/9/2023    HgA1c 4.9  3/9/2023  VPA *   *   FT4 *   *    Na 141  3/9/2023  CLZ *   *  WBC 6.06  3/9/2023    Cr 1.1  3/9/2023  ANC 3.0; 48.9;   3/9/2023   Hgb 16.6  3/9/2023     BUN 10  3/9/2023  Trop I *   *  HCT 46.5  3/9/2023     GFR >60  3/9/2023   CPK *   *    3/9/2023     Alb 4.2  3/9/2023   PRL *   *  B12 *   *     T Bili 1.0  3/9/2023  Chol 205 (H)  3/9/2023  B9 *   *    ALP 75  3/9/2023  TGs 204 (H)  3/9/2023  B1 *   *    AST 31  3/9/2023  HDL 38 (L)  3/9/2023  Vit D *   *     ALT 38  3/9/2023  .2  3/9/2023  HIV *   *     INR *   *  Uma *   *   Hep C *   *    GGT *   *  Lip *   *  RPR *   *    MCV 89  3/9/2023   NH4 *   *  UPT *   *       Results for orders placed or  performed in visit on 05/07/22   IN OFFICE EKG 12-LEAD (to [place])    Collection Time: 05/07/22 12:49 PM    Narrative    Test Reason : K21.9,    Vent. Rate : 086 BPM     Atrial Rate : 086 BPM     P-R Int : 172 ms          QRS Dur : 088 ms      QT Int : 370 ms       P-R-T Axes : 030 026 015 degrees     QTc Int : 442 ms    Normal sinus rhythm  Normal ECG  When compared with ECG of 17-JUN-2020 15:05,  Premature atrial complexes are no longer Present  Confirmed by Enrique Young MD ([id number]) on 5/8/2022 11:02:35 AM    Referred By: GONSALO FELIPE           Confirmed By:Enrique Young MD

## 2024-06-14 ENCOUNTER — OFFICE VISIT (OUTPATIENT)
Dept: PSYCHIATRY | Facility: CLINIC | Age: 44
End: 2024-06-14
Payer: MEDICAID

## 2024-06-14 DIAGNOSIS — F12.90 CANNABIS USE DISORDER: ICD-10-CM

## 2024-06-14 DIAGNOSIS — R69 PSYCHIATRIC DIAGNOSIS DEFERRED: ICD-10-CM

## 2024-06-14 DIAGNOSIS — F60.3 BORDERLINE PERSONALITY DISORDER: Primary | ICD-10-CM

## 2024-06-14 PROCEDURE — 1159F MED LIST DOCD IN RCRD: CPT | Mod: CPTII,95,, | Performed by: PHYSICIAN ASSISTANT

## 2024-06-14 PROCEDURE — 1160F RVW MEDS BY RX/DR IN RCRD: CPT | Mod: CPTII,95,, | Performed by: PHYSICIAN ASSISTANT

## 2024-06-14 PROCEDURE — G2211 COMPLEX E/M VISIT ADD ON: HCPCS | Mod: SA,HB,95, | Performed by: PHYSICIAN ASSISTANT

## 2024-06-14 PROCEDURE — 99205 OFFICE O/P NEW HI 60 MIN: CPT | Mod: SA,HB,95, | Performed by: PHYSICIAN ASSISTANT

## 2024-06-14 PROCEDURE — 4010F ACE/ARB THERAPY RXD/TAKEN: CPT | Mod: CPTII,95,, | Performed by: PHYSICIAN ASSISTANT

## 2024-06-14 RX ORDER — ESCITALOPRAM OXALATE 20 MG/1
20 TABLET ORAL DAILY
Qty: 90 TABLET | Refills: 1 | Status: SHIPPED | OUTPATIENT
Start: 2024-06-14 | End: 2024-12-11
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
## 2024-06-19 ENCOUNTER — PATIENT MESSAGE (OUTPATIENT)
Dept: PSYCHIATRY | Facility: CLINIC | Age: 44
End: 2024-06-19
Payer: MEDICAID

## 2024-06-19 ENCOUNTER — OFFICE VISIT (OUTPATIENT)
Dept: FAMILY MEDICINE | Facility: CLINIC | Age: 44
End: 2024-06-19
Payer: MEDICAID

## 2024-06-19 VITALS
TEMPERATURE: 98 F | WEIGHT: 259.69 LBS | HEART RATE: 91 BPM | DIASTOLIC BLOOD PRESSURE: 102 MMHG | BODY MASS INDEX: 40.76 KG/M2 | HEIGHT: 67 IN | OXYGEN SATURATION: 98 % | SYSTOLIC BLOOD PRESSURE: 156 MMHG

## 2024-06-19 DIAGNOSIS — I10 ESSENTIAL HYPERTENSION: Chronic | ICD-10-CM

## 2024-06-19 DIAGNOSIS — Z12.11 SCREENING FOR COLON CANCER: ICD-10-CM

## 2024-06-19 DIAGNOSIS — Z00.00 ROUTINE GENERAL MEDICAL EXAMINATION AT A HEALTH CARE FACILITY: ICD-10-CM

## 2024-06-19 DIAGNOSIS — F32.2 CURRENT SEVERE EPISODE OF MAJOR DEPRESSIVE DISORDER WITHOUT PSYCHOTIC FEATURES WITHOUT PRIOR EPISODE: Primary | ICD-10-CM

## 2024-06-19 PROCEDURE — 99214 OFFICE O/P EST MOD 30 MIN: CPT | Mod: S$PBB,,, | Performed by: FAMILY MEDICINE

## 2024-06-19 PROCEDURE — 3008F BODY MASS INDEX DOCD: CPT | Mod: CPTII,,, | Performed by: FAMILY MEDICINE

## 2024-06-19 PROCEDURE — 99214 OFFICE O/P EST MOD 30 MIN: CPT | Mod: PBBFAC,PN | Performed by: FAMILY MEDICINE

## 2024-06-19 PROCEDURE — 1159F MED LIST DOCD IN RCRD: CPT | Mod: CPTII,,, | Performed by: FAMILY MEDICINE

## 2024-06-19 PROCEDURE — 3077F SYST BP >= 140 MM HG: CPT | Mod: CPTII,,, | Performed by: FAMILY MEDICINE

## 2024-06-19 PROCEDURE — 4010F ACE/ARB THERAPY RXD/TAKEN: CPT | Mod: CPTII,,, | Performed by: FAMILY MEDICINE

## 2024-06-19 PROCEDURE — 3080F DIAST BP >= 90 MM HG: CPT | Mod: CPTII,,, | Performed by: FAMILY MEDICINE

## 2024-06-19 PROCEDURE — 99999 PR PBB SHADOW E&M-EST. PATIENT-LVL IV: CPT | Mod: PBBFAC,,, | Performed by: FAMILY MEDICINE

## 2024-06-19 RX ORDER — NIFEDIPINE 30 MG/1
30 TABLET, EXTENDED RELEASE ORAL DAILY
Qty: 90 TABLET | Refills: 1 | Status: SHIPPED | OUTPATIENT
Start: 2024-06-19 | End: 2024-12-16

## 2024-06-19 NOTE — PROGRESS NOTES
Chief Complaint   Patient presents with    Follow-up     2wk follow up       HPI  Romain French is a 44 y.o. male with multiple medical diagnoses as listed in the medical history and problem list that presents for follow-up for depression    He is cutting back on sugar and has stopped alcohol, meals are not healthy due to cost of food    He has been seeing psychiatry for depression and has started therapy; he has not started taking the lexapro as his wife has concerns about it; things are better between them now but he worries about if that will change    He has been taking his BP medicines and his readings are still high      ALLERGIES AND MEDICATIONS: updated and reviewed.  Review of patient's allergies indicates:  No Known Allergies  Medication List with Changes/Refills   New Medications    NIFEDIPINE (PROCARDIA-XL) 30 MG (OSM) 24 HR TABLET    Take 1 tablet (30 mg total) by mouth once daily.   Current Medications    ATORVASTATIN (LIPITOR) 40 MG TABLET    TAKE 1 TABLET BY MOUTH DAILY    CHLORTHALIDONE (HYGROTEN) 50 MG TAB    Take 1 tablet (50 mg total) by mouth once daily.    ESCITALOPRAM OXALATE (LEXAPRO) 20 MG TABLET    Take 1 tablet (20 mg total) by mouth once daily.    FLUTICASONE PROPIONATE (FLONASE) 50 MCG/ACTUATION NASAL SPRAY    1 spray by Each Nostril route.    IBUPROFEN-DIPHENHYDRAMINE -38 MG TAB    Take 1 tablet by mouth nightly as needed.    LISINOPRIL (PRINIVIL,ZESTRIL) 40 MG TABLET    Take 1 tablet (40 mg total) by mouth once daily.    NIFEDIPINE (PROCARDIA-XL) 90 MG (OSM) 24 HR TABLET    Take 1 tablet (90 mg total) by mouth once daily.       ROS  Review of Systems   Constitutional:  Negative for chills, fatigue, fever and unexpected weight change.   HENT:  Negative for ear pain, postnasal drip, rhinorrhea, sinus pressure and sore throat.    Eyes:  Negative for photophobia and visual disturbance.   Respiratory:  Negative for apnea, cough, chest tightness, shortness of breath and wheezing.   "  Cardiovascular:  Negative for chest pain and palpitations.   Gastrointestinal:  Negative for abdominal pain, blood in stool, constipation, diarrhea, nausea and vomiting.   Genitourinary:  Negative for difficulty urinating.   Musculoskeletal:  Negative for arthralgias and joint swelling.   Skin:  Negative for rash.   Neurological:  Negative for facial asymmetry, speech difficulty, weakness, numbness and headaches.   Psychiatric/Behavioral:  Positive for dysphoric mood.        Physical Exam  Vitals:    06/19/24 0902 06/19/24 0954   BP: (!) 166/98 (!) 156/102   BP Location: Right arm Right arm   Patient Position: Sitting Sitting   BP Method: Large (Manual) Large (Manual)   Pulse: 91    Temp: 98.3 °F (36.8 °C)    TempSrc: Oral    SpO2: 98%    Weight: 117.8 kg (259 lb 11.2 oz)    Height: 5' 7" (1.702 m)     Body mass index is 40.68 kg/m².  Weight: 117.8 kg (259 lb 11.2 oz)   Height: 5' 7" (170.2 cm)     Physical Exam  Vitals and nursing note reviewed.   Constitutional:       Appearance: He is well-developed.   HENT:      Head: Normocephalic and atraumatic.   Skin:     General: Skin is warm and dry.      Findings: No rash.   Neurological:      Mental Status: He is alert. Mental status is at baseline.   Psychiatric:         Behavior: Behavior normal.         Health Maintenance         Date Due Completion Date    TETANUS VACCINE Never done ---    Colorectal Cancer Screening Never done ---    COVID-19 Vaccine (3 - 2023-24 season) 09/01/2023 5/17/2021    Influenza Vaccine (Season Ended) 09/01/2024 10/28/2020    Hemoglobin A1c (Diabetic Prevention Screening) 03/09/2026 3/9/2023    Lipid Panel 03/09/2028 3/9/2023            Health maintenance reviewed and addressed as ordered      ASSESSMENT/PLAN       1. Current severe episode of major depressive disorder without psychotic features without prior episode  Strongly advise him to start lexapro  F/u in 1 month  Continue f/u with psych    2. Essential hypertension  Add nifedipine " 30mg to 90mg as previous dose was 120mg  - NIFEdipine (PROCARDIA-XL) 30 MG (OSM) 24 hr tablet; Take 1 tablet (30 mg total) by mouth once daily.  Dispense: 90 tablet; Refill: 1    3. Screening for colon cancer  as ordered     - Cologuard Screening (Multitarget Stool DNA); Future  - Cologuard Screening (Multitarget Stool DNA)    4. Routine general medical examination at a health care facility  For future use  - CBC Auto Differential; Future  - Comprehensive Metabolic Panel; Future  - Lipid Panel; Future  - Hemoglobin A1C; Future  - TSH; Future  - PSA, Screening; Future        Pricilla Ferrara MD  06/19/2024 9:22 AM        Follow up in about 1 month (around 7/19/2024) for management of chronic conditions.    Orders Placed This Encounter   Procedures    Cologuard Screening (Multitarget Stool DNA)    CBC Auto Differential    Comprehensive Metabolic Panel    Lipid Panel    Hemoglobin A1C    TSH    PSA, Screening

## 2024-06-20 ENCOUNTER — OFFICE VISIT (OUTPATIENT)
Dept: PSYCHIATRY | Facility: CLINIC | Age: 44
End: 2024-06-20
Payer: MEDICAID

## 2024-06-20 DIAGNOSIS — F33.1 MDD (MAJOR DEPRESSIVE DISORDER), RECURRENT EPISODE, MODERATE: Primary | ICD-10-CM

## 2024-06-20 DIAGNOSIS — Z63.0 MARITAL STRESS: ICD-10-CM

## 2024-06-20 PROCEDURE — 99211 OFF/OP EST MAY X REQ PHY/QHP: CPT | Mod: PBBFAC | Performed by: PSYCHOLOGIST

## 2024-06-20 PROCEDURE — 99999 PR PBB SHADOW E&M-EST. PATIENT-LVL I: CPT | Mod: PBBFAC,HB,, | Performed by: PSYCHOLOGIST

## 2024-06-20 SDOH — SOCIAL DETERMINANTS OF HEALTH (SDOH): PROBLEMS IN RELATIONSHIP WITH SPOUSE OR PARTNER: Z63.0
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
## 2024-06-21 ENCOUNTER — PATIENT MESSAGE (OUTPATIENT)
Dept: PSYCHIATRY | Facility: CLINIC | Age: 44
End: 2024-06-21
Payer: MEDICAID

## 2024-07-12 ENCOUNTER — PATIENT OUTREACH (OUTPATIENT)
Dept: ADMINISTRATIVE | Facility: HOSPITAL | Age: 44
End: 2024-07-12
Payer: MEDICAID

## 2024-07-12 NOTE — PROGRESS NOTES
Portal message sent in regards to returning at home Cologuard test. Immunization's updated/triggered.

## 2024-07-15 ENCOUNTER — OFFICE VISIT (OUTPATIENT)
Dept: PSYCHIATRY | Facility: CLINIC | Age: 44
End: 2024-07-15
Payer: MEDICAID

## 2024-07-15 ENCOUNTER — LAB VISIT (OUTPATIENT)
Dept: LAB | Facility: HOSPITAL | Age: 44
End: 2024-07-15
Attending: FAMILY MEDICINE
Payer: MEDICAID

## 2024-07-15 DIAGNOSIS — F60.3 BORDERLINE PERSONALITY DISORDER: ICD-10-CM

## 2024-07-15 DIAGNOSIS — Z00.00 ROUTINE GENERAL MEDICAL EXAMINATION AT A HEALTH CARE FACILITY: ICD-10-CM

## 2024-07-15 DIAGNOSIS — F12.90 CANNABIS USE DISORDER: ICD-10-CM

## 2024-07-15 DIAGNOSIS — F33.1 MDD (MAJOR DEPRESSIVE DISORDER), RECURRENT EPISODE, MODERATE: Primary | ICD-10-CM

## 2024-07-15 DIAGNOSIS — Z63.0 MARITAL STRESS: ICD-10-CM

## 2024-07-15 PROCEDURE — 84443 ASSAY THYROID STIM HORMONE: CPT | Performed by: FAMILY MEDICINE

## 2024-07-15 PROCEDURE — 90833 PSYTX W PT W E/M 30 MIN: CPT | Mod: SA,HB,95, | Performed by: PHYSICIAN ASSISTANT

## 2024-07-15 PROCEDURE — 4010F ACE/ARB THERAPY RXD/TAKEN: CPT | Mod: CPTII,95,, | Performed by: PHYSICIAN ASSISTANT

## 2024-07-15 PROCEDURE — 99214 OFFICE O/P EST MOD 30 MIN: CPT | Mod: SA,HB,95, | Performed by: PHYSICIAN ASSISTANT

## 2024-07-15 PROCEDURE — 80053 COMPREHEN METABOLIC PANEL: CPT | Performed by: FAMILY MEDICINE

## 2024-07-15 PROCEDURE — G2211 COMPLEX E/M VISIT ADD ON: HCPCS | Mod: SA,HB,95, | Performed by: PHYSICIAN ASSISTANT

## 2024-07-15 PROCEDURE — 85025 COMPLETE CBC W/AUTO DIFF WBC: CPT | Performed by: FAMILY MEDICINE

## 2024-07-15 PROCEDURE — 83036 HEMOGLOBIN GLYCOSYLATED A1C: CPT | Performed by: FAMILY MEDICINE

## 2024-07-15 PROCEDURE — 1160F RVW MEDS BY RX/DR IN RCRD: CPT | Mod: CPTII,95,, | Performed by: PHYSICIAN ASSISTANT

## 2024-07-15 PROCEDURE — 84153 ASSAY OF PSA TOTAL: CPT | Performed by: FAMILY MEDICINE

## 2024-07-15 PROCEDURE — 1159F MED LIST DOCD IN RCRD: CPT | Mod: CPTII,95,, | Performed by: PHYSICIAN ASSISTANT

## 2024-07-15 PROCEDURE — 36415 COLL VENOUS BLD VENIPUNCTURE: CPT | Mod: PN | Performed by: FAMILY MEDICINE

## 2024-07-15 PROCEDURE — 80061 LIPID PANEL: CPT | Performed by: FAMILY MEDICINE

## 2024-07-15 SDOH — SOCIAL DETERMINANTS OF HEALTH (SDOH): PROBLEMS IN RELATIONSHIP WITH SPOUSE OR PARTNER: Z63.0
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
## 2024-07-16 LAB
ALBUMIN SERPL BCP-MCNC: 4.1 G/DL (ref 3.5–5.2)
ALP SERPL-CCNC: 80 U/L (ref 55–135)
ALT SERPL W/O P-5'-P-CCNC: 41 U/L (ref 10–44)
ANION GAP SERPL CALC-SCNC: 11 MMOL/L (ref 8–16)
AST SERPL-CCNC: 28 U/L (ref 10–40)
BASOPHILS # BLD AUTO: 0.1 K/UL (ref 0–0.2)
BASOPHILS NFR BLD: 1.1 % (ref 0–1.9)
BILIRUB SERPL-MCNC: 0.4 MG/DL (ref 0.1–1)
BUN SERPL-MCNC: 23 MG/DL (ref 6–20)
CALCIUM SERPL-MCNC: 9.8 MG/DL (ref 8.7–10.5)
CHLORIDE SERPL-SCNC: 101 MMOL/L (ref 95–110)
CHOLEST SERPL-MCNC: 132 MG/DL (ref 120–199)
CHOLEST/HDLC SERPL: 3.7 {RATIO} (ref 2–5)
CO2 SERPL-SCNC: 30 MMOL/L (ref 23–29)
CREAT SERPL-MCNC: 1.2 MG/DL (ref 0.5–1.4)
DIFFERENTIAL METHOD BLD: ABNORMAL
EOSINOPHIL # BLD AUTO: 0.3 K/UL (ref 0–0.5)
EOSINOPHIL NFR BLD: 3.5 % (ref 0–8)
ERYTHROCYTE [DISTWIDTH] IN BLOOD BY AUTOMATED COUNT: 13.1 % (ref 11.5–14.5)
EST. GFR  (NO RACE VARIABLE): >60 ML/MIN/1.73 M^2
GLUCOSE SERPL-MCNC: 92 MG/DL (ref 70–110)
HCT VFR BLD AUTO: 44.5 % (ref 40–54)
HDLC SERPL-MCNC: 36 MG/DL (ref 40–75)
HDLC SERPL: 27.3 % (ref 20–50)
HGB BLD-MCNC: 15.1 G/DL (ref 14–18)
IMM GRANULOCYTES # BLD AUTO: 0.05 K/UL (ref 0–0.04)
IMM GRANULOCYTES NFR BLD AUTO: 0.5 % (ref 0–0.5)
LDLC SERPL CALC-MCNC: 48.8 MG/DL (ref 63–159)
LYMPHOCYTES # BLD AUTO: 2.8 K/UL (ref 1–4.8)
LYMPHOCYTES NFR BLD: 29.9 % (ref 18–48)
MCH RBC QN AUTO: 31.1 PG (ref 27–31)
MCHC RBC AUTO-ENTMCNC: 33.9 G/DL (ref 32–36)
MCV RBC AUTO: 92 FL (ref 82–98)
MONOCYTES # BLD AUTO: 0.7 K/UL (ref 0.3–1)
MONOCYTES NFR BLD: 7.8 % (ref 4–15)
NEUTROPHILS # BLD AUTO: 5.3 K/UL (ref 1.8–7.7)
NEUTROPHILS NFR BLD: 57.2 % (ref 38–73)
NONHDLC SERPL-MCNC: 96 MG/DL
NRBC BLD-RTO: 0 /100 WBC
PLATELET # BLD AUTO: 269 K/UL (ref 150–450)
PMV BLD AUTO: 11.2 FL (ref 9.2–12.9)
POTASSIUM SERPL-SCNC: 3.3 MMOL/L (ref 3.5–5.1)
PROT SERPL-MCNC: 7.6 G/DL (ref 6–8.4)
RBC # BLD AUTO: 4.85 M/UL (ref 4.6–6.2)
SODIUM SERPL-SCNC: 142 MMOL/L (ref 136–145)
TRIGL SERPL-MCNC: 236 MG/DL (ref 30–150)
TSH SERPL DL<=0.005 MIU/L-ACNC: 1.21 UIU/ML (ref 0.4–4)
WBC # BLD AUTO: 9.2 K/UL (ref 3.9–12.7)

## 2024-07-17 LAB
COMPLEXED PSA SERPL-MCNC: 0.91 NG/ML (ref 0–4)
ESTIMATED AVG GLUCOSE: 97 MG/DL (ref 68–131)
HBA1C MFR BLD: 5 % (ref 4–5.6)

## 2024-07-22 ENCOUNTER — OFFICE VISIT (OUTPATIENT)
Dept: FAMILY MEDICINE | Facility: CLINIC | Age: 44
End: 2024-07-22
Payer: MEDICAID

## 2024-07-22 VITALS
DIASTOLIC BLOOD PRESSURE: 86 MMHG | HEART RATE: 90 BPM | HEIGHT: 67 IN | OXYGEN SATURATION: 97 % | BODY MASS INDEX: 40.03 KG/M2 | WEIGHT: 255.06 LBS | SYSTOLIC BLOOD PRESSURE: 132 MMHG | TEMPERATURE: 98 F

## 2024-07-22 DIAGNOSIS — F32.2 CURRENT SEVERE EPISODE OF MAJOR DEPRESSIVE DISORDER WITHOUT PSYCHOTIC FEATURES WITHOUT PRIOR EPISODE: ICD-10-CM

## 2024-07-22 DIAGNOSIS — Z00.00 ROUTINE GENERAL MEDICAL EXAMINATION AT A HEALTH CARE FACILITY: Primary | ICD-10-CM

## 2024-07-22 DIAGNOSIS — E78.5 HYPERLIPIDEMIA, UNSPECIFIED HYPERLIPIDEMIA TYPE: ICD-10-CM

## 2024-07-22 DIAGNOSIS — I10 ESSENTIAL HYPERTENSION: Chronic | ICD-10-CM

## 2024-07-22 DIAGNOSIS — F12.90 CANNABIS USE DISORDER: ICD-10-CM

## 2024-07-22 PROCEDURE — 99999 PR PBB SHADOW E&M-EST. PATIENT-LVL III: CPT | Mod: PBBFAC,,, | Performed by: FAMILY MEDICINE

## 2024-07-22 PROCEDURE — 99213 OFFICE O/P EST LOW 20 MIN: CPT | Mod: PBBFAC,PN | Performed by: FAMILY MEDICINE

## 2024-07-22 PROCEDURE — 4010F ACE/ARB THERAPY RXD/TAKEN: CPT | Mod: CPTII,,, | Performed by: FAMILY MEDICINE

## 2024-07-22 PROCEDURE — 3079F DIAST BP 80-89 MM HG: CPT | Mod: CPTII,,, | Performed by: FAMILY MEDICINE

## 2024-07-22 PROCEDURE — 3008F BODY MASS INDEX DOCD: CPT | Mod: CPTII,,, | Performed by: FAMILY MEDICINE

## 2024-07-22 PROCEDURE — 1159F MED LIST DOCD IN RCRD: CPT | Mod: CPTII,,, | Performed by: FAMILY MEDICINE

## 2024-07-22 PROCEDURE — 3075F SYST BP GE 130 - 139MM HG: CPT | Mod: CPTII,,, | Performed by: FAMILY MEDICINE

## 2024-07-22 PROCEDURE — 3044F HG A1C LEVEL LT 7.0%: CPT | Mod: CPTII,,, | Performed by: FAMILY MEDICINE

## 2024-07-22 PROCEDURE — 99396 PREV VISIT EST AGE 40-64: CPT | Mod: S$PBB,,, | Performed by: FAMILY MEDICINE

## 2024-07-22 RX ORDER — ATORVASTATIN CALCIUM 40 MG/1
TABLET, FILM COATED ORAL
Qty: 90 TABLET | Refills: 3 | Status: SHIPPED | OUTPATIENT
Start: 2024-07-22

## 2024-07-22 NOTE — PROGRESS NOTES
Chief Complaint   Patient presents with    Annual Exam     Annual  Concerns of lexapro       HPI  Romain French is a 44 y.o. male with multiple medical diagnoses as listed in the medical history and problem list that presents for annual exam     Depression- he has had improvement taking lexapro, however has noticed he is unable to orgasm; he has not noticed a decrease in sexual desire, has been taking the medicine for a little over one week  THC use- has been smoking 1 joint daily, down from 3 times daily      ALLERGIES AND MEDICATIONS: updated and reviewed.  Review of patient's allergies indicates:  No Known Allergies  Medication List with Changes/Refills   Current Medications    CHLORTHALIDONE (HYGROTEN) 50 MG TAB    Take 1 tablet (50 mg total) by mouth once daily.    ESCITALOPRAM OXALATE (LEXAPRO) 20 MG TABLET    Take 1 tablet (20 mg total) by mouth once daily.    FLUTICASONE PROPIONATE (FLONASE) 50 MCG/ACTUATION NASAL SPRAY    1 spray by Each Nostril route.    IBUPROFEN-DIPHENHYDRAMINE -38 MG TAB    Take 1 tablet by mouth nightly as needed.    LISINOPRIL (PRINIVIL,ZESTRIL) 40 MG TABLET    Take 1 tablet (40 mg total) by mouth once daily.    NIFEDIPINE (PROCARDIA-XL) 30 MG (OSM) 24 HR TABLET    Take 1 tablet (30 mg total) by mouth once daily.    NIFEDIPINE (PROCARDIA-XL) 90 MG (OSM) 24 HR TABLET    Take 1 tablet (90 mg total) by mouth once daily.   Changed and/or Refilled Medications    Modified Medication Previous Medication    ATORVASTATIN (LIPITOR) 40 MG TABLET atorvastatin (LIPITOR) 40 MG tablet       TAKE 1 TABLET BY MOUTH DAILY    TAKE 1 TABLET BY MOUTH DAILY       ROS  Review of Systems   Constitutional:  Negative for chills, fatigue, fever and unexpected weight change.   HENT:  Negative for ear pain, postnasal drip, rhinorrhea, sinus pressure and sore throat.    Eyes:  Negative for photophobia and visual disturbance.   Respiratory:  Negative for apnea, cough, chest tightness, shortness of breath and  "wheezing.    Cardiovascular:  Negative for chest pain and palpitations.   Gastrointestinal:  Negative for abdominal pain, blood in stool, constipation, diarrhea, nausea and vomiting.   Genitourinary:  Negative for difficulty urinating.   Musculoskeletal:  Negative for arthralgias and joint swelling.   Skin:  Negative for rash.   Neurological:  Negative for facial asymmetry, speech difficulty, weakness, numbness and headaches.   Psychiatric/Behavioral:  Positive for dysphoric mood.        Physical Exam  Vitals:    07/22/24 0902   BP: 132/86   BP Location: Right arm   Patient Position: Sitting   BP Method: Large (Manual)   Pulse: 90   Temp: 98.4 °F (36.9 °C)   TempSrc: Oral   SpO2: 97%   Weight: 115.7 kg (255 lb 1.2 oz)   Height: 5' 7" (1.702 m)    Body mass index is 39.95 kg/m².  Weight: 115.7 kg (255 lb 1.2 oz)   Height: 5' 7" (170.2 cm)     Physical Exam  Vitals and nursing note reviewed.   Constitutional:       Appearance: He is well-developed.   HENT:      Head: Normocephalic and atraumatic.   Eyes:      Pupils: Pupils are equal, round, and reactive to light.   Cardiovascular:      Rate and Rhythm: Normal rate and regular rhythm.      Heart sounds: No murmur heard.  Pulmonary:      Breath sounds: Normal breath sounds. No wheezing or rales.   Abdominal:      General: Bowel sounds are normal. There is no distension.      Palpations: Abdomen is soft. There is no mass.      Tenderness: There is no abdominal tenderness. There is no guarding or rebound.      Hernia: No hernia is present.   Skin:     Findings: No rash.   Neurological:      Mental Status: He is alert. Mental status is at baseline.         Health Maintenance         Date Due Completion Date    TETANUS VACCINE Never done ---    COVID-19 Vaccine (3 - 2023-24 season) 09/01/2023 5/17/2021    Influenza Vaccine (1) 09/01/2024 10/28/2020    Colorectal Cancer Screening 07/09/2027 7/9/2024    Hemoglobin A1c (Diabetic Prevention Screening) 07/16/2027 7/16/2024    " Lipid Panel 07/16/2029 7/16/2024            Health maintenance reviewed and addressed as ordered      ASSESSMENT/PLAN       1. Routine general medical examination at a health care facility  discussed healthy lifestyle modification with exercise and healthy diet, reviewed age appropriate screening and healthy maintenance      2. Essential hypertension  Continue current regimen      3. Current severe episode of major depressive disorder without psychotic features without prior episode  On lexapro, has f/u with psychiatry  Recommend he establish with a therapist    4. Cannabis use disorder  Discussed that marijuana may affect his medications; he is not sure how to calm down without medication; I discussed that therapy can help develop these skills    5. Hyperlipidemia, unspecified hyperlipidemia type  - atorvastatin (LIPITOR) 40 MG tablet; TAKE 1 TABLET BY MOUTH DAILY  Dispense: 90 tablet; Refill: 3        Pricilla Ferrara MD  07/22/2024 9:29 AM        Follow up in about 3 months (around 10/22/2024).    No orders of the defined types were placed in this encounter.

## 2024-08-22 ENCOUNTER — OFFICE VISIT (OUTPATIENT)
Dept: PSYCHIATRY | Facility: CLINIC | Age: 44
End: 2024-08-22
Payer: MEDICAID

## 2024-08-22 DIAGNOSIS — F33.1 MDD (MAJOR DEPRESSIVE DISORDER), RECURRENT EPISODE, MODERATE: Primary | ICD-10-CM

## 2024-08-22 DIAGNOSIS — F12.90 CANNABIS USE DISORDER: ICD-10-CM

## 2024-08-22 DIAGNOSIS — Z63.0 MARITAL STRESS: ICD-10-CM

## 2024-08-22 DIAGNOSIS — F60.3 BORDERLINE PERSONALITY DISORDER: ICD-10-CM

## 2024-08-22 SDOH — SOCIAL DETERMINANTS OF HEALTH (SDOH): PROBLEMS IN RELATIONSHIP WITH SPOUSE OR PARTNER: Z63.0

## 2024-08-22 NOTE — PROGRESS NOTES
Outpatient Psychiatry Follow-Up Visit ([facility])    8/22/2024    Clinical Status of Patient:  Outpatient (Ambulatory)    Chief Complaint:  Romain French is a 44 y.o. male who presents today for follow-up of depression, anxiety, and behavior problems.  Met with patient.      The patient location is: In his car in [place]  The chief complaint leading to consultation is: Continued eval    Visit type: audiovisual    Face to Face time with patient: 14 min  25 minutes of total time spent on the encounter, which includes face to face time and non-face to face time preparing to see the patient (eg, review of tests), Obtaining and/or reviewing separately obtained history, Documenting clinical information in the electronic or other health record, Independently interpreting results (not separately reported) and communicating results to the patient/family/caregiver, or Care coordination (not separately reported).         Each patient to whom he or she provides medical services by telemedicine is:  (1) informed of the relationship between the physician and patient and the respective role of any other health care provider with respect to management of the patient; and (2) notified that he or she may decline to receive medical services by telemedicine and may withdraw from such care at any time.    Notes:       Interval History and Content of Current Session:  Interim Events/Subjective Report/Content of Current Session: Pt presents for follow up of borderline personality disorder, MDD, marital stress, and cannabis use disorder.  He is currently taking escitalopram 20 mg daily.  He states that his anxiety is much better controlled.  Has started journaling and reports his family says he is much nicer to be around.  Is having decreased libido. Reports decreased cannabis and alcohol use lately.      Review of Systems   PSYCHIATRIC: Pertinant items are noted in the narrative.    Past Medical, Family and Social History: The patient's past  medical, family and social history have been reviewed and updated as appropriate within the electronic medical record - see encounter notes.    Compliance: yes    Side effects: decreased libido    Risk Parameters:  Patient reports no suicidal ideation  Patient reports no homicidal ideation  Patient reports no self-injurious behavior  Patient reports no violent behavior    Exam (detailed: at least 9 elements; comprehensive: all 15 elements)   Constitutional  Vitals:  Most recent vital signs, dated less than 90 days prior to this appointment, were reviewed.   There were no vitals filed for this visit.     General:  unremarkable, age appropriate, casually dressed, obese     Musculoskeletal  Muscle Strength/Tone:  not examined   Gait & Station:  Not observed     Psychiatric  Speech:  no latency; no press   Mood & Affect:  steady, euthymic  congruent and appropriate   Thought Process:  normal and logical   Associations:  intact   Thought Content:  normal, no suicidality, no homicidality, delusions, or paranoia   Insight:  has awareness of illness   Judgement: behavior is adequate to circumstances   Orientation:  grossly intact   Memory: intact for content of interview   Language: grossly intact   Attention Span & Concentration:  able to focus   Fund of Knowledge:  intact and appropriate to age and level of education     Assessment and Diagnosis   Status/Progress: Based on the examination today, the patient's problem(s) is/are improved and well controlled.  New problems have not been presented today.   Diagnostic uncertainty are complicating management of the primary condition.  The working differential for this patient includes PTSD, MDD, MAGGIE, BPD.     General Impression: Complex clinical picture with pt meeting criteria for MDD, MAGGIE, PTSD, and BPD.  Doing significantly better on escitalopram, but with decreased libido.  Discussed augmenting with buspirone vs switching to vilazodone, pt opts to continue with current  plan.  Also discussed cannabis's propensity to cause sexual dysfunction- pt states he believes cannabis actually enhances his sexual function.        ICD-10-CM ICD-9-CM   1. MDD (major depressive disorder), recurrent episode, moderate  F33.1 296.32   2. Marital stress  Z63.0 V61.10   3. Cannabis use disorder  F12.90 305.20   4. Borderline personality disorder  F60.3 301.83         Continue escitalopram 20 mg daily.  F/u 1m    Intervention/Counseling/Treatment Plan   Medication Management: The risks and benefits of medication were discussed with the patient.      Return to Clinic: 1 month    Visit today included increased complexity associated with the care of the episodic problem anger, mood, anxiety addressed and managing the longitudinal care of the patient due to the serious and/or complex managed problem(s) BPD, cannabis use disorder.

## 2024-09-19 ENCOUNTER — PATIENT MESSAGE (OUTPATIENT)
Dept: FAMILY MEDICINE | Facility: CLINIC | Age: 44
End: 2024-09-19
Payer: MEDICAID

## 2024-10-25 ENCOUNTER — OFFICE VISIT (OUTPATIENT)
Dept: FAMILY MEDICINE | Facility: CLINIC | Age: 44
End: 2024-10-25
Payer: MEDICAID

## 2024-10-25 VITALS
TEMPERATURE: 99 F | OXYGEN SATURATION: 98 % | RESPIRATION RATE: 18 BRPM | SYSTOLIC BLOOD PRESSURE: 126 MMHG | BODY MASS INDEX: 40.07 KG/M2 | HEART RATE: 90 BPM | HEIGHT: 67 IN | WEIGHT: 255.31 LBS | DIASTOLIC BLOOD PRESSURE: 82 MMHG

## 2024-10-25 DIAGNOSIS — I10 ESSENTIAL HYPERTENSION: Primary | Chronic | ICD-10-CM

## 2024-10-25 DIAGNOSIS — Z82.49 FAMILY HISTORY OF HEART DISEASE: ICD-10-CM

## 2024-10-25 DIAGNOSIS — F12.90 MARIJUANA USE: ICD-10-CM

## 2024-10-25 DIAGNOSIS — F32.2 CURRENT SEVERE EPISODE OF MAJOR DEPRESSIVE DISORDER WITHOUT PSYCHOTIC FEATURES WITHOUT PRIOR EPISODE: ICD-10-CM

## 2024-10-25 PROCEDURE — 99214 OFFICE O/P EST MOD 30 MIN: CPT | Mod: PBBFAC,PN | Performed by: FAMILY MEDICINE

## 2024-10-25 PROCEDURE — 99999 PR PBB SHADOW E&M-EST. PATIENT-LVL IV: CPT | Mod: PBBFAC,,, | Performed by: FAMILY MEDICINE

## 2024-10-25 NOTE — PROGRESS NOTES
Chief Complaint   Patient presents with    Follow-up     Inflammation in right knee. Education on Heart Health because of family History        HPI  Romain French is a 44 y.o. male with multiple medical diagnoses as listed in the medical history and problem list that presents for follow-up for depression, has concerns about his brother's diagnosis of heart disease    He has only been using THC on weekends and has cut back on drinking, he is trying to get out more and set boundaries to enable him to deal with problems    Right knee- has episodes of inflammation, he does have popping and clicking, does not give out, no pain today but has not started activity      ALLERGIES AND MEDICATIONS: updated and reviewed.  Review of patient's allergies indicates:  Not on File  Medication List with Changes/Refills   Current Medications    ATORVASTATIN (LIPITOR) 40 MG TABLET    TAKE 1 TABLET BY MOUTH DAILY    CHLORTHALIDONE (HYGROTEN) 50 MG TAB    Take 1 tablet (50 mg total) by mouth once daily.    ESCITALOPRAM OXALATE (LEXAPRO) 20 MG TABLET    Take 1 tablet (20 mg total) by mouth once daily.    LISINOPRIL (PRINIVIL,ZESTRIL) 40 MG TABLET    Take 1 tablet (40 mg total) by mouth once daily.    NIFEDIPINE (PROCARDIA-XL) 30 MG (OSM) 24 HR TABLET    Take 1 tablet (30 mg total) by mouth once daily.    NIFEDIPINE (PROCARDIA-XL) 90 MG (OSM) 24 HR TABLET    Take 1 tablet (90 mg total) by mouth once daily.   Discontinued Medications    FLUTICASONE PROPIONATE (FLONASE) 50 MCG/ACTUATION NASAL SPRAY    1 spray by Each Nostril route.    IBUPROFEN-DIPHENHYDRAMINE -38 MG TAB    Take 1 tablet by mouth nightly as needed.       ROS  Review of Systems   Constitutional:  Negative for chills, fatigue, fever and unexpected weight change.   HENT:  Negative for ear pain, postnasal drip, rhinorrhea, sinus pressure and sore throat.    Eyes:  Negative for photophobia and visual disturbance.   Respiratory:  Negative for apnea, cough, chest tightness,  "shortness of breath and wheezing.    Cardiovascular:  Negative for chest pain and palpitations.   Gastrointestinal:  Negative for abdominal pain, blood in stool, constipation, diarrhea, nausea and vomiting.   Genitourinary:  Negative for difficulty urinating.   Musculoskeletal:  Positive for arthralgias. Negative for joint swelling.   Skin:  Negative for rash.   Neurological:  Negative for facial asymmetry, speech difficulty, weakness, numbness and headaches.   Psychiatric/Behavioral:  Positive for dysphoric mood.        Physical Exam  Vitals:    10/25/24 0848   BP: 126/82   BP Location: Right arm   Patient Position: Sitting   Pulse: 90   Resp: 18   Temp: 98.5 °F (36.9 °C)   TempSrc: Oral   SpO2: 98%   Weight: 115.8 kg (255 lb 4.7 oz)   Height: 5' 7" (1.702 m)    Body mass index is 39.98 kg/m².  Weight: 115.8 kg (255 lb 4.7 oz)   Height: 5' 7" (170.2 cm)     Physical Exam  Vitals and nursing note reviewed.   Constitutional:       Appearance: He is well-developed.   HENT:      Head: Normocephalic and atraumatic.   Skin:     General: Skin is warm and dry.      Findings: No rash.   Neurological:      Mental Status: He is alert. Mental status is at baseline.   Psychiatric:         Behavior: Behavior normal.         Health Maintenance         Date Due Completion Date    TETANUS VACCINE Never done ---    Influenza Vaccine (1) 09/01/2024 10/28/2020    COVID-19 Vaccine (3 - 2024-25 season) 09/01/2024 5/17/2021    Colorectal Cancer Screening 07/09/2027 7/9/2024    Hemoglobin A1c (Diabetic Prevention Screening) 07/16/2027 7/16/2024    Lipid Panel 07/16/2029 7/16/2024    RSV Vaccine (Age 60+ and Pregnant patients) (1 - 1-dose 75+ series) 04/04/2053 ---            Health maintenance reviewed and addressed as ordered      ASSESSMENT/PLAN       1. Essential hypertension  Continue current regimen and lifestyle changes    2. Marijuana use  He is only using THC on weekends    3. Family history of heart disease  Will get stress test " and echo due to hx of HTN and brother's hx of heart disease  - Exercise Stress - EKG; Future  - Echo; Future    4. Current severe episode of major depressive disorder without psychotic features without prior episode    Recommend therapy along with medication mgmt  Continue current regimen    Pricilla Ferrara MD  10/25/2024 8:54 AM        Follow up in about 6 months (around 4/25/2025).    Orders Placed This Encounter   Procedures    Exercise Stress - EKG    Echo

## 2024-10-30 ENCOUNTER — OFFICE VISIT (OUTPATIENT)
Dept: FAMILY MEDICINE | Facility: CLINIC | Age: 44
End: 2024-10-30
Payer: MEDICAID

## 2024-10-30 VITALS
SYSTOLIC BLOOD PRESSURE: 130 MMHG | TEMPERATURE: 98 F | OXYGEN SATURATION: 96 % | WEIGHT: 254.19 LBS | RESPIRATION RATE: 19 BRPM | HEART RATE: 92 BPM | DIASTOLIC BLOOD PRESSURE: 89 MMHG | BODY MASS INDEX: 39.9 KG/M2 | HEIGHT: 67 IN

## 2024-10-30 DIAGNOSIS — M25.561 ACUTE PAIN OF RIGHT KNEE: Primary | ICD-10-CM

## 2024-10-30 PROCEDURE — 99213 OFFICE O/P EST LOW 20 MIN: CPT | Mod: S$PBB,25,, | Performed by: FAMILY MEDICINE

## 2024-10-30 PROCEDURE — 99999 PR PBB SHADOW E&M-EST. PATIENT-LVL III: CPT | Mod: PBBFAC,,, | Performed by: FAMILY MEDICINE

## 2024-10-30 PROCEDURE — 3075F SYST BP GE 130 - 139MM HG: CPT | Mod: CPTII,,, | Performed by: FAMILY MEDICINE

## 2024-10-30 PROCEDURE — 3079F DIAST BP 80-89 MM HG: CPT | Mod: CPTII,,, | Performed by: FAMILY MEDICINE

## 2024-10-30 PROCEDURE — 99999PBSHW PR PBB SHADOW TECHNICAL ONLY FILED TO HB: Mod: PBBFAC,,,

## 2024-10-30 PROCEDURE — 1159F MED LIST DOCD IN RCRD: CPT | Mod: CPTII,,, | Performed by: FAMILY MEDICINE

## 2024-10-30 PROCEDURE — 99213 OFFICE O/P EST LOW 20 MIN: CPT | Mod: PBBFAC,PN | Performed by: FAMILY MEDICINE

## 2024-10-30 PROCEDURE — 20610 DRAIN/INJ JOINT/BURSA W/O US: CPT | Mod: PBBFAC,PN | Performed by: FAMILY MEDICINE

## 2024-10-30 PROCEDURE — 4010F ACE/ARB THERAPY RXD/TAKEN: CPT | Mod: CPTII,,, | Performed by: FAMILY MEDICINE

## 2024-10-30 PROCEDURE — 3044F HG A1C LEVEL LT 7.0%: CPT | Mod: CPTII,,, | Performed by: FAMILY MEDICINE

## 2024-10-30 PROCEDURE — 3008F BODY MASS INDEX DOCD: CPT | Mod: CPTII,,, | Performed by: FAMILY MEDICINE

## 2024-10-30 RX ORDER — TRIAMCINOLONE ACETONIDE 40 MG/ML
40 INJECTION, SUSPENSION INTRA-ARTICULAR; INTRAMUSCULAR
Status: DISCONTINUED | OUTPATIENT
Start: 2024-10-30 | End: 2024-10-30 | Stop reason: HOSPADM

## 2024-10-30 RX ADMIN — TRIAMCINOLONE ACETONIDE 40 MG: 40 INJECTION, SUSPENSION INTRA-ARTICULAR; INTRAMUSCULAR at 07:10

## 2024-11-12 ENCOUNTER — HOSPITAL ENCOUNTER (OUTPATIENT)
Dept: CARDIOLOGY | Facility: HOSPITAL | Age: 44
Discharge: HOME | End: 2024-11-12
Attending: FAMILY MEDICINE
Payer: MEDICAID

## 2024-11-12 VITALS — HEIGHT: 67 IN | BODY MASS INDEX: 39.87 KG/M2 | WEIGHT: 254 LBS

## 2024-11-12 DIAGNOSIS — Z82.49 FAMILY HISTORY OF HEART DISEASE: ICD-10-CM

## 2024-11-12 LAB
AORTIC ROOT ANNULUS: 4.35 CM
AORTIC VALVE CUSP SEPERATION: 2.46 CM
ASCENDING AORTA: 4.17 CM
AV INDEX (PROSTH): 0.76
AV MEAN GRADIENT: 3.7 MMHG
AV PEAK GRADIENT: 6.8 MMHG
AV VALVE AREA BY VELOCITY RATIO: 3.7 CM²
AV VALVE AREA: 4.1 CM²
AV VELOCITY RATIO: 0.69
BSA FOR ECHO PROCEDURE: 2.33 M2
CV ECHO LV RWT: 0.83 CM
DOP CALC AO PEAK VEL: 1.3 M/S
DOP CALC AO VTI: 22.1 CM
DOP CALC LVOT AREA: 5.3 CM2
DOP CALC LVOT DIAMETER: 2.6 CM
DOP CALC LVOT PEAK VEL: 0.9 M/S
DOP CALC LVOT STROKE VOLUME: 89.7 CM3
DOP CALCLVOT PEAK VEL VTI: 16.9 CM
E WAVE DECELERATION TIME: 204.03 MSEC
E/A RATIO: 1.03
E/E' RATIO: 9.5 M/S
ECHO LV POSTERIOR WALL: 1.7 CM (ref 0.6–1.1)
FRACTIONAL SHORTENING: 26.8 % (ref 28–44)
INTERVENTRICULAR SEPTUM: 1.7 CM (ref 0.6–1.1)
IVC DIAMETER: 1.81 CM
IVRT: 94.2 MSEC
LA MAJOR: 5.77 CM
LA MINOR: 5.45 CM
LA WIDTH: 4.3 CM
LEFT ATRIUM SIZE: 3.68 CM
LEFT ATRIUM VOLUME INDEX: 33.7 ML/M2
LEFT ATRIUM VOLUME: 75.4 CM3
LEFT INTERNAL DIMENSION IN SYSTOLE: 3 CM (ref 2.1–4)
LEFT VENTRICLE DIASTOLIC VOLUME INDEX: 33.96 ML/M2
LEFT VENTRICLE DIASTOLIC VOLUME: 76.07 ML
LEFT VENTRICLE MASS INDEX: 131.4 G/M2
LEFT VENTRICLE SYSTOLIC VOLUME INDEX: 14.9 ML/M2
LEFT VENTRICLE SYSTOLIC VOLUME: 33.48 ML
LEFT VENTRICULAR INTERNAL DIMENSION IN DIASTOLE: 4.1 CM (ref 3.5–6)
LEFT VENTRICULAR MASS: 294.3 G
LV LATERAL E/E' RATIO: 7.6 M/S
LV SEPTAL E/E' RATIO: 12.67 M/S
LVED V (TEICH): 76.07 ML
LVES V (TEICH): 33.48 ML
LVOT MG: 2.04 MMHG
LVOT MV: 0.69 CM/S
MV PEAK A VEL: 0.74 M/S
MV PEAK E VEL: 0.76 M/S
MV STENOSIS PRESSURE HALF TIME: 59.17 MS
MV VALVE AREA P 1/2 METHOD: 3.72 CM2
PISA TR MAX VEL: 2.42 M/S
PULM VEIN S/D RATIO: 1.23
PV PEAK D VEL: 0.48 M/S
PV PEAK GRADIENT: 4 MMHG
PV PEAK S VEL: 0.59 M/S
PV PEAK VELOCITY: 1.01 M/S
RA MAJOR: 5.4 CM
RA PRESSURE ESTIMATED: 3 MMHG
RA WIDTH: 2.9 CM
RIGHT VENTRICULAR END-DIASTOLIC DIMENSION: 3.89 CM
RV TB RVSP: 5 MMHG
RV TISSUE DOPPLER FREE WALL SYSTOLIC VELOCITY 1 (APICAL 4 CHAMBER VIEW): 18.58 CM/S
SINUS: 4.3 CM
STJ: 3.34 CM
TDI LATERAL: 0.1 M/S
TDI SEPTAL: 0.06 M/S
TDI: 0.08 M/S
TR MAX PG: 23 MMHG
TRICUSPID ANNULAR PLANE SYSTOLIC EXCURSION: 2.05 CM
TV REST PULMONARY ARTERY PRESSURE: 26 MMHG
Z-SCORE OF LEFT VENTRICULAR DIMENSION IN END DIASTOLE: -6.7
Z-SCORE OF LEFT VENTRICULAR DIMENSION IN END SYSTOLE: -3.81

## 2024-11-12 PROCEDURE — 93306 TTE W/DOPPLER COMPLETE: CPT

## 2024-11-12 PROCEDURE — 93306 TTE W/DOPPLER COMPLETE: CPT | Mod: 26,,, | Performed by: INTERNAL MEDICINE

## 2024-11-19 ENCOUNTER — PATIENT MESSAGE (OUTPATIENT)
Dept: FAMILY MEDICINE | Facility: CLINIC | Age: 44
End: 2024-11-19
Payer: MEDICAID

## 2024-12-18 DIAGNOSIS — I10 ESSENTIAL HYPERTENSION: Chronic | ICD-10-CM

## 2024-12-18 RX ORDER — NIFEDIPINE 90 MG/1
90 TABLET, EXTENDED RELEASE ORAL
Qty: 90 TABLET | Refills: 3 | Status: SHIPPED | OUTPATIENT
Start: 2024-12-18

## 2024-12-18 RX ORDER — LISINOPRIL 40 MG/1
40 TABLET ORAL
Qty: 90 TABLET | Refills: 2 | Status: SHIPPED | OUTPATIENT
Start: 2024-12-18

## 2024-12-19 NOTE — TELEPHONE ENCOUNTER
Refill Decision Note   Romain Madridin  is requesting a refill authorization.  Brief Assessment and Rationale for Refill:  Approve     Medication Therapy Plan:        Comments:     Note composed:8:57 PM 12/18/2024

## 2024-12-19 NOTE — TELEPHONE ENCOUNTER
No care due was identified.  Health [facility] Embedded Care Due Messages. Reference number: [id number].   12/18/2024 8:48:52 PM CST

## 2025-01-27 DIAGNOSIS — I10 ESSENTIAL HYPERTENSION: Chronic | ICD-10-CM

## 2025-01-27 RX ORDER — CHLORTHALIDONE 50 MG/1
TABLET ORAL
Qty: 90 TABLET | Refills: 0 | Status: SHIPPED | OUTPATIENT
Start: 2025-01-27

## 2025-01-27 NOTE — TELEPHONE ENCOUNTER
Refill Routing Note   Medication(s) are not appropriate for processing by [facility] for the following reason(s):        Required labs abnormal    [facility] action(s):  Defer               Appointments  past 12m or future 3m with PCP    Date Provider   Last Visit   10/30/2024 Pricilla Ferrara MD   Next Visit   4/25/2025 Pricilla Ferrara MD   ED visits in past 90 days: 0        Note composed:4:06 PM 01/27/2025

## 2025-01-27 NOTE — TELEPHONE ENCOUNTER
No care due was identified.  Health [facility] Embedded Care Due Messages. Reference number: [id number].   1/27/2025 8:43:17 AM CST

## 2025-05-22 DIAGNOSIS — I10 ESSENTIAL HYPERTENSION: Chronic | ICD-10-CM

## 2025-05-22 RX ORDER — CHLORTHALIDONE 50 MG/1
TABLET ORAL
Qty: 90 TABLET | Refills: 0 | Status: SHIPPED | OUTPATIENT
Start: 2025-05-22

## 2025-05-22 NOTE — TELEPHONE ENCOUNTER
No care due was identified.  Health [facility] Embedded Care Due Messages. Reference number: [id number].   5/22/2025 1:09:58 PM CDT

## 2025-05-22 NOTE — TELEPHONE ENCOUNTER
Refill Routing Note   Medication(s) are not appropriate for processing by [facility] for the following reason(s):        Required labs abnormal    [facility] action(s):  Defer               Appointments  past 12m or future 3m with PCP    Date Provider   Last Visit   10/30/2024 Pricilla Ferrara MD   Next Visit   5/28/2025 Pricilla Ferrara MD   ED visits in past 90 days: 0        Note composed:3:35 PM 05/22/2025

## 2025-05-28 ENCOUNTER — OFFICE VISIT (OUTPATIENT)
Dept: FAMILY MEDICINE | Facility: CLINIC | Age: 45
End: 2025-05-28
Payer: MEDICAID

## 2025-05-28 VITALS
SYSTOLIC BLOOD PRESSURE: 150 MMHG | DIASTOLIC BLOOD PRESSURE: 100 MMHG | TEMPERATURE: 98 F | HEART RATE: 84 BPM | OXYGEN SATURATION: 97 % | WEIGHT: 256.63 LBS | HEIGHT: 67 IN | RESPIRATION RATE: 19 BRPM | BODY MASS INDEX: 40.28 KG/M2

## 2025-05-28 DIAGNOSIS — Z13.1 SCREENING FOR DIABETES MELLITUS: ICD-10-CM

## 2025-05-28 DIAGNOSIS — Z12.5 SCREENING FOR PROSTATE CANCER: ICD-10-CM

## 2025-05-28 DIAGNOSIS — E78.5 HYPERLIPIDEMIA, UNSPECIFIED HYPERLIPIDEMIA TYPE: ICD-10-CM

## 2025-05-28 DIAGNOSIS — E66.01 SEVERE OBESITY (BMI 35.0-39.9) WITH COMORBIDITY: Chronic | ICD-10-CM

## 2025-05-28 DIAGNOSIS — R06.83 SNORING: ICD-10-CM

## 2025-05-28 DIAGNOSIS — I10 ESSENTIAL HYPERTENSION: Primary | Chronic | ICD-10-CM

## 2025-05-28 PROBLEM — K82.9 GALLBLADDER PAIN: Status: RESOLVED | Noted: 2020-06-24 | Resolved: 2025-05-28

## 2025-05-28 PROCEDURE — 3008F BODY MASS INDEX DOCD: CPT | Mod: CPTII,,, | Performed by: FAMILY MEDICINE

## 2025-05-28 PROCEDURE — 4010F ACE/ARB THERAPY RXD/TAKEN: CPT | Mod: CPTII,,, | Performed by: FAMILY MEDICINE

## 2025-05-28 PROCEDURE — 3080F DIAST BP >= 90 MM HG: CPT | Mod: CPTII,,, | Performed by: FAMILY MEDICINE

## 2025-05-28 PROCEDURE — 99214 OFFICE O/P EST MOD 30 MIN: CPT | Mod: S$PBB,,, | Performed by: FAMILY MEDICINE

## 2025-05-28 PROCEDURE — 99214 OFFICE O/P EST MOD 30 MIN: CPT | Mod: PBBFAC,PN | Performed by: FAMILY MEDICINE

## 2025-05-28 PROCEDURE — 3077F SYST BP >= 140 MM HG: CPT | Mod: CPTII,,, | Performed by: FAMILY MEDICINE

## 2025-05-28 PROCEDURE — 1159F MED LIST DOCD IN RCRD: CPT | Mod: CPTII,,, | Performed by: FAMILY MEDICINE

## 2025-05-28 PROCEDURE — 99999 PR PBB SHADOW E&M-EST. PATIENT-LVL IV: CPT | Mod: PBBFAC,,, | Performed by: FAMILY MEDICINE

## 2025-05-28 RX ORDER — LISINOPRIL 40 MG/1
40 TABLET ORAL DAILY
Qty: 90 TABLET | Refills: 2 | Status: SHIPPED | OUTPATIENT
Start: 2025-05-28

## 2025-05-28 RX ORDER — NIFEDIPINE 30 MG/1
30 TABLET, EXTENDED RELEASE ORAL DAILY
COMMUNITY

## 2025-05-28 RX ORDER — CHLORTHALIDONE 50 MG/1
TABLET ORAL
Qty: 90 TABLET | Refills: 1 | Status: SHIPPED | OUTPATIENT
Start: 2025-05-28

## 2025-05-28 NOTE — PROGRESS NOTES
Chief Complaint   Patient presents with    Follow-up       HPI  Romain French is a 45 y.o. 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  Review of patient's allergies indicates:  No Known Allergies  Medication List with Changes/Refills   Current Medications    ATORVASTATIN (LIPITOR) 40 MG TABLET    TAKE 1 TABLET BY MOUTH DAILY    ESCITALOPRAM OXALATE (LEXAPRO) 20 MG TABLET    Take 1 tablet (20 mg total) by mouth once daily.    NIFEDIPINE (ADALAT CC) 30 MG TBSR    Take 30 mg by mouth once daily.    NIFEDIPINE (PROCARDIA-XL) 90 MG (OSM) 24 HR TABLET    TAKE 1 TABLET(90 MG) BY MOUTH EVERY DAY   Changed and/or Refilled Medications    Modified Medication Previous Medication    CHLORTHALIDONE (HYGROTEN) 50 MG TAB chlorthalidone (HYGROTEN) 50 MG Tab       TAKE 1 TABLET(50 MG) BY MOUTH DAILY    TAKE 1 TABLET(50 MG) BY MOUTH DAILY    LISINOPRIL (PRINIVIL,ZESTRIL) 40 MG TABLET lisinopriL (PRINIVIL,ZESTRIL) 40 MG tablet       Take 1 tablet (40 mg total) by mouth once daily.    TAKE 1 TABLET(40 MG) BY MOUTH DAILY       ROS  Review of Systems   Constitutional:  Negative for chills, fatigue, fever and unexpected weight change.   HENT:  Negative for ear pain, postnasal drip, rhinorrhea, sinus pressure and sore throat.   "  Eyes:  Negative for photophobia and visual disturbance.   Respiratory:  Negative for apnea, cough, chest tightness, shortness of breath and wheezing.    Cardiovascular:  Negative for chest pain and palpitations.   Gastrointestinal:  Negative for abdominal pain, blood in stool, constipation, diarrhea, nausea and vomiting.   Genitourinary:  Negative for difficulty urinating.   Musculoskeletal:  Negative for arthralgias and joint swelling.   Skin:  Negative for rash.   Neurological:  Negative for facial asymmetry, speech difficulty, weakness, numbness and headaches.   Psychiatric/Behavioral:  Negative for dysphoric mood.        Physical Exam  Vitals:    05/28/25 1034 05/28/25 1344   BP: (!) 142/100  Comment: Pt states he just took his medication (!) 150/100   BP Location: Right arm Right arm   Patient Position: Sitting Sitting   Pulse: 84    Resp: 19    Temp: 98.2 °F (36.8 °C)    TempSrc: Oral    SpO2: 97%    Weight: 116.4 kg (256 lb 9.9 oz)    Height: 5' 7" (1.702 m)     Body mass index is 40.19 kg/m².  Weight: 116.4 kg (256 lb 9.9 oz)   Height: 5' 7" (170.2 cm)     Physical Exam  Vitals and nursing note reviewed.   Constitutional:       Appearance: He is well-developed.   HENT:      Head: Normocephalic and atraumatic.      Mouth/Throat:      Pharynx: No oropharyngeal exudate.   Cardiovascular:      Rate and Rhythm: Normal rate and regular rhythm.      Heart sounds: Normal heart sounds. No murmur heard.     No friction rub. No gallop.   Pulmonary:      Effort: Pulmonary effort is normal. No respiratory distress.      Breath sounds: Normal breath sounds. No wheezing or rales.   Chest:      Chest wall: No tenderness.   Lymphadenopathy:      Cervical: No cervical adenopathy.   Skin:     General: Skin is warm and dry.   Neurological:      Mental Status: He is alert. Mental status is at baseline.   Psychiatric:         Behavior: Behavior normal.           Health maintenance reviewed and addressed as " ordered      ASSESSMENT/PLAN     1. Essential hypertension (Primary)  Needs to take medicine consistently  Monitoring BP at home  Consider further testing if urinary frequency does not improve with BP control  F/u in 1 month  - CBC Auto Differential; Future  - Comprehensive Metabolic Panel; Future  - TSH; Future  - lisinopriL (PRINIVIL,ZESTRIL) 40 MG tablet; Take 1 tablet (40 mg total) by mouth once daily.  Dispense: 90 tablet; Refill: 2  - chlorthalidone (HYGROTEN) 50 MG Tab; TAKE 1 TABLET(50 MG) BY MOUTH DAILY  Dispense: 90 tablet; Refill: 1    2. Severe obesity (BMI 35.0-39.9) with comorbidity  Will recheck BP and discuss medication at next visit     3. Hyperlipidemia, unspecified hyperlipidemia type  monitor with routine labs  Continue statin  - Lipid Panel; Future    4. Screening for prostate cancer  Continue monitoring with albs  - PSA, Screening; Future    5. Screening for diabetes mellitus  as ordered     - Hemoglobin A1C; Future    6. Snoring  Needs DIRK evaluation  May help with blood pressure control and frequent urination  - Ambulatory referral/consult to Sleep Disorders; Future      Pricilla Ferrara MD  05/29/2025 11:20 AM        Follow up in about 1 month (around 6/28/2025).    Orders Placed This Encounter   Procedures    CBC Auto Differential    Comprehensive Metabolic Panel    Lipid Panel    Hemoglobin A1C    TSH    PSA, Screening    Ambulatory referral/consult to Sleep Disorders

## 2025-05-29 ENCOUNTER — LAB VISIT (OUTPATIENT)
Dept: LAB | Facility: HOSPITAL | Age: 45
End: 2025-05-29
Attending: FAMILY MEDICINE
Payer: MEDICAID

## 2025-05-29 DIAGNOSIS — Z13.1 SCREENING FOR DIABETES MELLITUS: ICD-10-CM

## 2025-05-29 DIAGNOSIS — Z12.5 SCREENING FOR PROSTATE CANCER: ICD-10-CM

## 2025-05-29 DIAGNOSIS — I10 ESSENTIAL HYPERTENSION: ICD-10-CM

## 2025-05-29 DIAGNOSIS — E78.5 HYPERLIPIDEMIA, UNSPECIFIED HYPERLIPIDEMIA TYPE: ICD-10-CM

## 2025-05-29 LAB
ABSOLUTE EOSINOPHIL (OHS): 0.2 K/UL
ABSOLUTE MONOCYTE (OHS): 0.68 K/UL (ref 0.3–1)
ABSOLUTE NEUTROPHIL COUNT (OHS): 4.12 K/UL (ref 1.8–7.7)
ALBUMIN SERPL BCP-MCNC: 3.9 G/DL (ref 3.5–5.2)
ALP SERPL-CCNC: 55 UNIT/L (ref 40–150)
ALT SERPL W/O P-5'-P-CCNC: 50 UNIT/L (ref 10–44)
ANION GAP (OHS): 13 MMOL/L (ref 8–16)
AST SERPL-CCNC: 29 UNIT/L (ref 11–45)
BASOPHILS # BLD AUTO: 0.07 K/UL
BASOPHILS NFR BLD AUTO: 1 %
BILIRUB SERPL-MCNC: 0.4 MG/DL (ref 0.1–1)
BUN SERPL-MCNC: 19 MG/DL (ref 6–20)
CALCIUM SERPL-MCNC: 9 MG/DL (ref 8.7–10.5)
CHLORIDE SERPL-SCNC: 105 MMOL/L (ref 95–110)
CHOLEST SERPL-MCNC: 199 MG/DL (ref 120–199)
CHOLEST/HDLC SERPL: 7.7 {RATIO} (ref 2–5)
CO2 SERPL-SCNC: 22 MMOL/L (ref 23–29)
CREAT SERPL-MCNC: 1.1 MG/DL (ref 0.5–1.4)
EAG (OHS): 94 MG/DL (ref 68–131)
ERYTHROCYTE [DISTWIDTH] IN BLOOD BY AUTOMATED COUNT: 13.4 % (ref 11.5–14.5)
GFR SERPLBLD CREATININE-BSD FMLA CKD-EPI: >60 ML/MIN/1.73/M2
GLUCOSE SERPL-MCNC: 85 MG/DL (ref 70–110)
HBA1C MFR BLD: 4.9 % (ref 4–5.6)
HCT VFR BLD AUTO: 46.5 % (ref 40–54)
HDLC SERPL-MCNC: 26 MG/DL (ref 40–75)
HDLC SERPL: 13.1 % (ref 20–50)
HGB BLD-MCNC: 16.3 GM/DL (ref 14–18)
IMM GRANULOCYTES # BLD AUTO: 0.03 K/UL (ref 0–0.04)
IMM GRANULOCYTES NFR BLD AUTO: 0.4 % (ref 0–0.5)
LDLC SERPL CALC-MCNC: ABNORMAL MG/DL
LYMPHOCYTES # BLD AUTO: 2.21 K/UL (ref 1–4.8)
MCH RBC QN AUTO: 31.8 PG (ref 27–31)
MCHC RBC AUTO-ENTMCNC: 35.1 G/DL (ref 32–36)
MCV RBC AUTO: 91 FL (ref 82–98)
NONHDLC SERPL-MCNC: 173 MG/DL
NUCLEATED RBC (/100WBC) (OHS): 0 /100 WBC
PLATELET # BLD AUTO: 263 K/UL (ref 150–450)
PMV BLD AUTO: 11.1 FL (ref 9.2–12.9)
POTASSIUM SERPL-SCNC: 3.3 MMOL/L (ref 3.5–5.1)
PROT SERPL-MCNC: 8.2 GM/DL (ref 6–8.4)
PSA SERPL-MCNC: 0.81 NG/ML
RBC # BLD AUTO: 5.12 M/UL (ref 4.6–6.2)
RELATIVE EOSINOPHIL (OHS): 2.7 %
RELATIVE LYMPHOCYTE (OHS): 30.2 % (ref 18–48)
RELATIVE MONOCYTE (OHS): 9.3 % (ref 4–15)
RELATIVE NEUTROPHIL (OHS): 56.4 % (ref 38–73)
SODIUM SERPL-SCNC: 140 MMOL/L (ref 136–145)
TRIGL SERPL-MCNC: 701 MG/DL (ref 30–150)
TSH SERPL-ACNC: 2.45 UIU/ML (ref 0.4–4)
WBC # BLD AUTO: 7.31 K/UL (ref 3.9–12.7)

## 2025-05-29 PROCEDURE — 84443 ASSAY THYROID STIM HORMONE: CPT

## 2025-05-29 PROCEDURE — 85025 COMPLETE CBC W/AUTO DIFF WBC: CPT

## 2025-05-29 PROCEDURE — 83036 HEMOGLOBIN GLYCOSYLATED A1C: CPT

## 2025-05-29 PROCEDURE — 80053 COMPREHEN METABOLIC PANEL: CPT

## 2025-05-29 PROCEDURE — 36415 COLL VENOUS BLD VENIPUNCTURE: CPT | Mod: PN

## 2025-05-29 PROCEDURE — 80061 LIPID PANEL: CPT

## 2025-05-29 PROCEDURE — 84153 ASSAY OF PSA TOTAL: CPT

## 2025-06-09 ENCOUNTER — PATIENT MESSAGE (OUTPATIENT)
Dept: ADMINISTRATIVE | Facility: HOSPITAL | Age: 45
End: 2025-06-09
Payer: MEDICAID

## 2025-06-10 ENCOUNTER — PATIENT OUTREACH (OUTPATIENT)
Dept: ADMINISTRATIVE | Facility: HOSPITAL | Age: 45
End: 2025-06-10
Payer: MEDICAID

## 2025-06-10 VITALS — DIASTOLIC BLOOD PRESSURE: 79 MMHG | SYSTOLIC BLOOD PRESSURE: 114 MMHG

## 2025-06-17 ENCOUNTER — RESULTS FOLLOW-UP (OUTPATIENT)
Dept: FAMILY MEDICINE | Facility: CLINIC | Age: 45
End: 2025-06-17
Payer: MEDICAID

## 2025-06-17 DIAGNOSIS — E78.5 HYPERLIPIDEMIA, UNSPECIFIED HYPERLIPIDEMIA TYPE: ICD-10-CM

## 2025-06-17 RX ORDER — ATORVASTATIN CALCIUM 40 MG/1
TABLET, FILM COATED ORAL
Qty: 90 TABLET | Refills: 3 | Status: SHIPPED | OUTPATIENT
Start: 2025-06-17

## 2025-08-15 ENCOUNTER — PATIENT MESSAGE (OUTPATIENT)
Dept: PSYCHIATRY | Facility: CLINIC | Age: 45
End: 2025-08-15
Payer: MEDICAID

## 2025-08-28 ENCOUNTER — PATIENT MESSAGE (OUTPATIENT)
Dept: FAMILY MEDICINE | Facility: CLINIC | Age: 45
End: 2025-08-28
Payer: MEDICAID

## (undated) DEVICE — GLOVE SURGICAL LATEX SZ 7

## (undated) DEVICE — CLIPPER BLADE MOD 4406 (CAREF)

## (undated) DEVICE — SUT MONOCRYL 4.0 PS2 CP496G

## (undated) DEVICE — COVER OVERHEAD SURG LT BLUE

## (undated) DEVICE — SCISSOR CURVED ENDOPATH 5MM

## (undated) DEVICE — KIT ANTIFOG

## (undated) DEVICE — APPLICATOR CHLORAPREP ORN 26ML

## (undated) DEVICE — TROCAR ENDOPATH XCEL 5X100MM

## (undated) DEVICE — SEE MEDLINE ITEM 157110

## (undated) DEVICE — TUBING INSUFFLATION 10

## (undated) DEVICE — ELECTRODE EZ CLEAN L-HOOK 13.5

## (undated) DEVICE — UNDERGLOVES BIOGEL PI SIZE 7.5

## (undated) DEVICE — BLANKET UPPER BODY 78.7X29.9IN

## (undated) DEVICE — CLOSURE SKIN STERI STRIP 1/2X4

## (undated) DEVICE — SYR 10CC LUER LOCK

## (undated) DEVICE — Device

## (undated) DEVICE — KITTNER ENDOSCOPIC SINGLE TIP

## (undated) DEVICE — DRESSING ADH ISLAND 2.5 X 3

## (undated) DEVICE — PACK ENDOSCOPY GENERAL

## (undated) DEVICE — ADHESIVE DERMABOND MINI HV

## (undated) DEVICE — SUT ETHIBOND EXCEL 0 MO6 18

## (undated) DEVICE — APPLIER CLIP ENDO LIGAMAX 5MM

## (undated) DEVICE — ELECTRODE REM PLYHSV RETURN 9

## (undated) DEVICE — TROCAR KII BLLN 12MM 10CM

## (undated) DEVICE — SEE MEDLINE ITEM 152622

## (undated) DEVICE — NDL HYPO REG 25G X 1 1/2